# Patient Record
Sex: FEMALE | Race: WHITE | NOT HISPANIC OR LATINO | URBAN - METROPOLITAN AREA
[De-identification: names, ages, dates, MRNs, and addresses within clinical notes are randomized per-mention and may not be internally consistent; named-entity substitution may affect disease eponyms.]

---

## 2017-04-28 ENCOUNTER — OFFICE (OUTPATIENT)
Dept: URBAN - METROPOLITAN AREA CLINIC 75 | Facility: CLINIC | Age: 77
End: 2017-04-28

## 2017-04-28 VITALS — HEIGHT: 61 IN

## 2017-04-28 DIAGNOSIS — K64.0 FIRST DEGREE HEMORRHOIDS: ICD-10-CM

## 2017-04-28 PROBLEM — K64.8 HEMORRHOIDS, INTERNAL: Status: ACTIVE | Noted: 2017-04-28

## 2017-04-28 PROCEDURE — 46221 LIGATION OF HEMORRHOID(S): CPT | Performed by: INTERNAL MEDICINE

## 2017-04-28 NOTE — SERVICEHPINOTES
76-year-old white female with a history of anemia status post colonoscopy in 2015.  She is here now because she's had painless rectal bleeding with bowel movements.  She has a history of grade 1 hemorrhoids and presents for hemorrhoid banding.

## 2017-05-15 ENCOUNTER — OFFICE (OUTPATIENT)
Dept: URBAN - METROPOLITAN AREA CLINIC 75 | Facility: CLINIC | Age: 77
End: 2017-05-15

## 2017-05-15 VITALS — HEIGHT: 61 IN

## 2017-05-15 DIAGNOSIS — K64.0 FIRST DEGREE HEMORRHOIDS: ICD-10-CM

## 2017-05-15 DIAGNOSIS — K64.4 RESIDUAL HEMORRHOIDAL SKIN TAGS: ICD-10-CM

## 2017-05-15 PROBLEM — K62.5 RECTAL BLEEDING: Status: ACTIVE | Noted: 2017-04-28

## 2017-05-15 PROCEDURE — 46221 LIGATION OF HEMORRHOID(S): CPT | Performed by: INTERNAL MEDICINE

## 2017-05-15 NOTE — SERVICEHPINOTES
76-year-old white female with a history of anemia status post colonoscopy in 2015. She is here now because she's had painless rectal bleeding with bowel movements. She has a history of grade 1 hemorrhoids and presents for her 2nd hemorrhoid banding.

## 2018-04-11 ENCOUNTER — HOSPITAL ENCOUNTER (INPATIENT)
Facility: HOSPITAL | Age: 78
LOS: 12 days | Discharge: HOME HEALTH | End: 2018-04-23
Attending: PHYSICAL MEDICINE & REHABILITATION | Admitting: PHYSICAL MEDICINE & REHABILITATION

## 2018-04-11 DIAGNOSIS — I60.9 SUBARACHNOID HEMORRHAGE (HCC): ICD-10-CM

## 2018-04-11 PROCEDURE — 25010000002 CEFTRIAXONE PER 250 MG: Performed by: PHYSICAL MEDICINE & REHABILITATION

## 2018-04-11 RX ORDER — CLOPIDOGREL BISULFATE 75 MG/1
75 TABLET ORAL DAILY
Status: DISCONTINUED | OUTPATIENT
Start: 2018-04-12 | End: 2018-04-23 | Stop reason: HOSPADM

## 2018-04-11 RX ORDER — PANTOPRAZOLE SODIUM 40 MG/1
40 TABLET, DELAYED RELEASE ORAL
Status: DISCONTINUED | OUTPATIENT
Start: 2018-04-12 | End: 2018-04-23 | Stop reason: HOSPADM

## 2018-04-11 RX ORDER — ASPIRIN 325 MG
325 TABLET ORAL DAILY
Status: DISCONTINUED | OUTPATIENT
Start: 2018-04-12 | End: 2018-04-23 | Stop reason: HOSPADM

## 2018-04-11 RX ORDER — DIPHENOXYLATE HYDROCHLORIDE AND ATROPINE SULFATE 2.5; .025 MG/1; MG/1
1 TABLET ORAL DAILY
Status: DISCONTINUED | OUTPATIENT
Start: 2018-04-12 | End: 2018-04-23 | Stop reason: HOSPADM

## 2018-04-11 RX ORDER — ASCORBIC ACID 500 MG
500 TABLET ORAL DAILY
Status: DISCONTINUED | OUTPATIENT
Start: 2018-04-12 | End: 2018-04-23 | Stop reason: HOSPADM

## 2018-04-11 RX ORDER — LANOLIN ALCOHOL/MO/W.PET/CERES
50 CREAM (GRAM) TOPICAL DAILY
Status: DISCONTINUED | OUTPATIENT
Start: 2018-04-12 | End: 2018-04-23 | Stop reason: HOSPADM

## 2018-04-11 RX ORDER — DEXAMETHASONE 0.5 MG/1
1 TABLET ORAL
Status: COMPLETED | OUTPATIENT
Start: 2018-04-14 | End: 2018-04-15

## 2018-04-11 RX ORDER — FOLIC ACID 1 MG/1
1 TABLET ORAL DAILY
Status: DISCONTINUED | OUTPATIENT
Start: 2018-04-12 | End: 2018-04-23 | Stop reason: HOSPADM

## 2018-04-11 RX ORDER — TRIAMCINOLONE ACETONIDE 1 MG/G
CREAM TOPICAL EVERY 12 HOURS PRN
Status: DISCONTINUED | OUTPATIENT
Start: 2018-04-11 | End: 2018-04-23

## 2018-04-11 RX ORDER — LEVETIRACETAM 500 MG/1
1000 TABLET ORAL 2 TIMES DAILY
Status: DISCONTINUED | OUTPATIENT
Start: 2018-04-11 | End: 2018-04-23 | Stop reason: HOSPADM

## 2018-04-11 RX ORDER — TERAZOSIN 2 MG/1
2 CAPSULE ORAL NIGHTLY
Status: DISCONTINUED | OUTPATIENT
Start: 2018-04-11 | End: 2018-04-12

## 2018-04-11 RX ORDER — MELATONIN
1000 DAILY
Status: DISCONTINUED | OUTPATIENT
Start: 2018-04-12 | End: 2018-04-23 | Stop reason: HOSPADM

## 2018-04-11 RX ORDER — MONTELUKAST SODIUM 10 MG/1
10 TABLET ORAL DAILY
Status: DISCONTINUED | OUTPATIENT
Start: 2018-04-12 | End: 2018-04-23 | Stop reason: HOSPADM

## 2018-04-11 RX ORDER — METRONIDAZOLE 500 MG/1
500 TABLET ORAL EVERY 8 HOURS SCHEDULED
Status: DISCONTINUED | OUTPATIENT
Start: 2018-04-11 | End: 2018-04-23 | Stop reason: HOSPADM

## 2018-04-11 RX ORDER — EPINEPHRINE 0.3 MG/.3ML
3 INJECTION SUBCUTANEOUS AS NEEDED
Status: DISCONTINUED | OUTPATIENT
Start: 2018-04-11 | End: 2018-04-23

## 2018-04-11 RX ORDER — OXYCODONE HYDROCHLORIDE AND ACETAMINOPHEN 5; 325 MG/1; MG/1
1 TABLET ORAL EVERY 4 HOURS PRN
Status: DISCONTINUED | OUTPATIENT
Start: 2018-04-11 | End: 2018-04-23

## 2018-04-11 RX ORDER — CETIRIZINE HYDROCHLORIDE 10 MG/1
5 TABLET ORAL DAILY
Status: DISCONTINUED | OUTPATIENT
Start: 2018-04-12 | End: 2018-04-23 | Stop reason: HOSPADM

## 2018-04-11 RX ORDER — LACOSAMIDE 100 MG/1
100 TABLET ORAL EVERY 12 HOURS SCHEDULED
Status: DISCONTINUED | OUTPATIENT
Start: 2018-04-11 | End: 2018-04-23 | Stop reason: HOSPADM

## 2018-04-11 RX ORDER — HYDRALAZINE HYDROCHLORIDE 25 MG/1
25 TABLET, FILM COATED ORAL EVERY 8 HOURS SCHEDULED
Status: DISCONTINUED | OUTPATIENT
Start: 2018-04-11 | End: 2018-04-18

## 2018-04-11 RX ORDER — DEXAMETHASONE 2 MG/1
2 TABLET ORAL
Status: COMPLETED | OUTPATIENT
Start: 2018-04-12 | End: 2018-04-13

## 2018-04-11 RX ORDER — FUROSEMIDE 20 MG/1
20 TABLET ORAL DAILY
Status: DISCONTINUED | OUTPATIENT
Start: 2018-04-12 | End: 2018-04-20

## 2018-04-11 RX ORDER — OXYCODONE HYDROCHLORIDE AND ACETAMINOPHEN 5; 325 MG/1; MG/1
2 TABLET ORAL EVERY 4 HOURS PRN
Status: DISCONTINUED | OUTPATIENT
Start: 2018-04-11 | End: 2018-04-23

## 2018-04-11 RX ORDER — ALBUTEROL SULFATE 2.5 MG/3ML
2.5 SOLUTION RESPIRATORY (INHALATION) EVERY 6 HOURS PRN
Status: DISCONTINUED | OUTPATIENT
Start: 2018-04-11 | End: 2018-04-23

## 2018-04-11 RX ORDER — METOPROLOL SUCCINATE 100 MG/1
100 TABLET, EXTENDED RELEASE ORAL
Status: DISCONTINUED | OUTPATIENT
Start: 2018-04-12 | End: 2018-04-12

## 2018-04-11 RX ORDER — ESCITALOPRAM OXALATE 20 MG/1
20 TABLET ORAL DAILY
Status: DISCONTINUED | OUTPATIENT
Start: 2018-04-12 | End: 2018-04-23 | Stop reason: HOSPADM

## 2018-04-11 RX ADMIN — TERAZOSIN HYDROCHLORIDE ANHYDROUS 2 MG: 2 CAPSULE ORAL at 23:26

## 2018-04-11 RX ADMIN — METRONIDAZOLE 500 MG: 500 TABLET, FILM COATED ORAL at 23:27

## 2018-04-11 RX ADMIN — LEVETIRACETAM 1000 MG: 500 TABLET, FILM COATED ORAL at 23:27

## 2018-04-11 RX ADMIN — LACOSAMIDE 100 MG: 100 TABLET, FILM COATED ORAL at 23:26

## 2018-04-11 RX ADMIN — SODIUM CHLORIDE 2 G: 900 INJECTION INTRAVENOUS at 23:48

## 2018-04-11 RX ADMIN — HYDRALAZINE HYDROCHLORIDE 25 MG: 25 TABLET ORAL at 23:27

## 2018-04-12 PROBLEM — I67.848 CEREBRAL VASOSPASM: Status: ACTIVE | Noted: 2018-03-24

## 2018-04-12 PROBLEM — E78.5 HLD (HYPERLIPIDEMIA): Status: ACTIVE | Noted: 2018-04-12

## 2018-04-12 PROBLEM — K21.9 ACID REFLUX: Status: ACTIVE | Noted: 2018-04-12

## 2018-04-12 PROBLEM — S30.1XXA GROIN HEMATOMA: Status: ACTIVE | Noted: 2018-03-27

## 2018-04-12 PROBLEM — I67.1 BALLOON LIKE SWELLING OF AN ARTERY OF THE BRAIN: Status: ACTIVE | Noted: 2018-04-12

## 2018-04-12 PROBLEM — I60.9 SUBARACHNOID HEMORRHAGE (HCC): Status: ACTIVE | Noted: 2018-04-12

## 2018-04-12 PROBLEM — Z22.322 MRSA (METHICILLIN RESISTANT STAPH AUREUS) CULTURE POSITIVE: Status: ACTIVE | Noted: 2018-04-12

## 2018-04-12 PROBLEM — I77.6: Status: ACTIVE | Noted: 2018-03-22

## 2018-04-12 PROBLEM — I60.9: Status: ACTIVE | Noted: 2018-03-22

## 2018-04-12 PROBLEM — G93.41 ACUTE METABOLIC ENCEPHALOPATHY: Status: ACTIVE | Noted: 2018-03-24

## 2018-04-12 LAB
ALBUMIN SERPL-MCNC: 2.9 G/DL (ref 3.5–5.2)
ALBUMIN/GLOB SERPL: 1.2 G/DL
ALP SERPL-CCNC: 60 U/L (ref 39–117)
ALT SERPL W P-5'-P-CCNC: 29 U/L (ref 1–33)
ANION GAP SERPL CALCULATED.3IONS-SCNC: 14.5 MMOL/L
AST SERPL-CCNC: 20 U/L (ref 1–32)
BASOPHILS # BLD AUTO: 0.04 10*3/MM3 (ref 0–0.2)
BASOPHILS NFR BLD AUTO: 0.8 % (ref 0–1.5)
BILIRUB SERPL-MCNC: 0.8 MG/DL (ref 0.1–1.2)
BUN BLD-MCNC: 20 MG/DL (ref 8–23)
BUN/CREAT SERPL: 19.2 (ref 7–25)
CALCIUM SPEC-SCNC: 8.4 MG/DL (ref 8.6–10.5)
CHLORIDE SERPL-SCNC: 100 MMOL/L (ref 98–107)
CO2 SERPL-SCNC: 20.5 MMOL/L (ref 22–29)
CREAT BLD-MCNC: 1.04 MG/DL (ref 0.57–1)
DEPRECATED RDW RBC AUTO: 52.4 FL (ref 37–54)
EOSINOPHIL # BLD AUTO: 0.11 10*3/MM3 (ref 0–0.7)
EOSINOPHIL NFR BLD AUTO: 2.1 % (ref 0.3–6.2)
ERYTHROCYTE [DISTWIDTH] IN BLOOD BY AUTOMATED COUNT: 16.2 % (ref 11.7–13)
GFR SERPL CREATININE-BSD FRML MDRD: 52 ML/MIN/1.73
GLOBULIN UR ELPH-MCNC: 2.5 GM/DL
GLUCOSE BLD-MCNC: 104 MG/DL (ref 65–99)
GLUCOSE BLDC GLUCOMTR-MCNC: 101 MG/DL (ref 70–130)
GLUCOSE BLDC GLUCOMTR-MCNC: 120 MG/DL (ref 70–130)
GLUCOSE BLDC GLUCOMTR-MCNC: 124 MG/DL (ref 70–130)
GLUCOSE BLDC GLUCOMTR-MCNC: 150 MG/DL (ref 70–130)
HCT VFR BLD AUTO: 28.3 % (ref 35.6–45.5)
HGB BLD-MCNC: 9.3 G/DL (ref 11.9–15.5)
IMM GRANULOCYTES # BLD: 0.1 10*3/MM3 (ref 0–0.03)
IMM GRANULOCYTES NFR BLD: 1.9 % (ref 0–0.5)
LYMPHOCYTES # BLD AUTO: 1.1 10*3/MM3 (ref 0.9–4.8)
LYMPHOCYTES NFR BLD AUTO: 21 % (ref 19.6–45.3)
MAGNESIUM SERPL-MCNC: 1.7 MG/DL (ref 1.6–2.4)
MCH RBC QN AUTO: 29.4 PG (ref 26.9–32)
MCHC RBC AUTO-ENTMCNC: 32.9 G/DL (ref 32.4–36.3)
MCV RBC AUTO: 89.6 FL (ref 80.5–98.2)
MONOCYTES # BLD AUTO: 0.26 10*3/MM3 (ref 0.2–1.2)
MONOCYTES NFR BLD AUTO: 5 % (ref 5–12)
NEUTROPHILS # BLD AUTO: 3.62 10*3/MM3 (ref 1.9–8.1)
NEUTROPHILS NFR BLD AUTO: 69.2 % (ref 42.7–76)
PHOSPHATE SERPL-MCNC: 3.8 MG/DL (ref 2.5–4.5)
PLATELET # BLD AUTO: 181 10*3/MM3 (ref 140–500)
PMV BLD AUTO: 8.8 FL (ref 6–12)
POTASSIUM BLD-SCNC: 3.6 MMOL/L (ref 3.5–5.2)
PREALB SERPL-MCNC: 24.4 MG/DL (ref 20–40)
PROT SERPL-MCNC: 5.4 G/DL (ref 6–8.5)
RBC # BLD AUTO: 3.16 10*6/MM3 (ref 3.9–5.2)
SODIUM BLD-SCNC: 135 MMOL/L (ref 136–145)
WBC NRBC COR # BLD: 5.23 10*3/MM3 (ref 4.5–10.7)

## 2018-04-12 PROCEDURE — 97535 SELF CARE MNGMENT TRAINING: CPT | Performed by: OCCUPATIONAL THERAPIST

## 2018-04-12 PROCEDURE — 97110 THERAPEUTIC EXERCISES: CPT

## 2018-04-12 PROCEDURE — 97166 OT EVAL MOD COMPLEX 45 MIN: CPT | Performed by: OCCUPATIONAL THERAPIST

## 2018-04-12 PROCEDURE — 84134 ASSAY OF PREALBUMIN: CPT | Performed by: STUDENT IN AN ORGANIZED HEALTH CARE EDUCATION/TRAINING PROGRAM

## 2018-04-12 PROCEDURE — 25010000002 CEFTRIAXONE: Performed by: PHYSICAL MEDICINE & REHABILITATION

## 2018-04-12 PROCEDURE — 97162 PT EVAL MOD COMPLEX 30 MIN: CPT

## 2018-04-12 PROCEDURE — 80053 COMPREHEN METABOLIC PANEL: CPT | Performed by: STUDENT IN AN ORGANIZED HEALTH CARE EDUCATION/TRAINING PROGRAM

## 2018-04-12 PROCEDURE — 96125 COGNITIVE TEST BY HC PRO: CPT

## 2018-04-12 PROCEDURE — 84100 ASSAY OF PHOSPHORUS: CPT | Performed by: STUDENT IN AN ORGANIZED HEALTH CARE EDUCATION/TRAINING PROGRAM

## 2018-04-12 PROCEDURE — 83735 ASSAY OF MAGNESIUM: CPT | Performed by: STUDENT IN AN ORGANIZED HEALTH CARE EDUCATION/TRAINING PROGRAM

## 2018-04-12 PROCEDURE — 85025 COMPLETE CBC W/AUTO DIFF WBC: CPT | Performed by: STUDENT IN AN ORGANIZED HEALTH CARE EDUCATION/TRAINING PROGRAM

## 2018-04-12 PROCEDURE — 82962 GLUCOSE BLOOD TEST: CPT

## 2018-04-12 PROCEDURE — 97110 THERAPEUTIC EXERCISES: CPT | Performed by: OCCUPATIONAL THERAPIST

## 2018-04-12 PROCEDURE — 97112 NEUROMUSCULAR REEDUCATION: CPT | Performed by: OCCUPATIONAL THERAPIST

## 2018-04-12 RX ORDER — BENZONATATE 100 MG/1
100 CAPSULE ORAL 2 TIMES DAILY
COMMUNITY
End: 2018-04-23 | Stop reason: HOSPADM

## 2018-04-12 RX ORDER — FOLIC ACID 1 MG/1
1 TABLET ORAL DAILY
COMMUNITY

## 2018-04-12 RX ORDER — HYDRALAZINE HYDROCHLORIDE 10 MG/1
10 TABLET, FILM COATED ORAL EVERY 6 HOURS PRN
Status: DISCONTINUED | OUTPATIENT
Start: 2018-04-12 | End: 2018-04-23 | Stop reason: HOSPADM

## 2018-04-12 RX ORDER — MELATONIN
1000 DAILY
COMMUNITY

## 2018-04-12 RX ORDER — ESCITALOPRAM OXALATE 20 MG/1
20 TABLET ORAL DAILY
COMMUNITY

## 2018-04-12 RX ORDER — DOXAZOSIN 2 MG/1
2 TABLET ORAL NIGHTLY
COMMUNITY
End: 2018-04-23 | Stop reason: HOSPADM

## 2018-04-12 RX ORDER — MONTELUKAST SODIUM 10 MG/1
10 TABLET ORAL NIGHTLY
COMMUNITY

## 2018-04-12 RX ORDER — CETIRIZINE HYDROCHLORIDE 10 MG/1
10 TABLET ORAL DAILY
Status: ON HOLD | COMMUNITY
End: 2018-04-23

## 2018-04-12 RX ORDER — LANOLIN ALCOHOL/MO/W.PET/CERES
100 CREAM (GRAM) TOPICAL DAILY
COMMUNITY

## 2018-04-12 RX ORDER — ASCORBIC ACID 500 MG
500 TABLET ORAL DAILY
COMMUNITY

## 2018-04-12 RX ADMIN — PANTOPRAZOLE SODIUM 40 MG: 40 TABLET, DELAYED RELEASE ORAL at 06:29

## 2018-04-12 RX ADMIN — DEXAMETHASONE 2 MG: 2 TABLET ORAL at 07:50

## 2018-04-12 RX ADMIN — MONTELUKAST 10 MG: 10 TABLET, FILM COATED ORAL at 07:50

## 2018-04-12 RX ADMIN — LEVETIRACETAM 1000 MG: 500 TABLET, FILM COATED ORAL at 21:10

## 2018-04-12 RX ADMIN — CETIRIZINE HYDROCHLORIDE 5 MG: 10 TABLET, FILM COATED ORAL at 07:51

## 2018-04-12 RX ADMIN — OXYCODONE HYDROCHLORIDE AND ACETAMINOPHEN 500 MG: 500 TABLET ORAL at 07:51

## 2018-04-12 RX ADMIN — LACOSAMIDE 100 MG: 100 TABLET, FILM COATED ORAL at 07:49

## 2018-04-12 RX ADMIN — ESCITALOPRAM 20 MG: 20 TABLET, FILM COATED ORAL at 07:50

## 2018-04-12 RX ADMIN — PYRIDOXINE HCL TAB 50 MG 50 MG: 50 TAB at 07:51

## 2018-04-12 RX ADMIN — Medication 1 TABLET: at 07:51

## 2018-04-12 RX ADMIN — LEVETIRACETAM 1000 MG: 500 TABLET, FILM COATED ORAL at 07:50

## 2018-04-12 RX ADMIN — METRONIDAZOLE 500 MG: 500 TABLET, FILM COATED ORAL at 21:10

## 2018-04-12 RX ADMIN — LACOSAMIDE 100 MG: 100 TABLET, FILM COATED ORAL at 21:10

## 2018-04-12 RX ADMIN — FOLIC ACID 1 MG: 1 TABLET ORAL at 07:52

## 2018-04-12 RX ADMIN — FUROSEMIDE 20 MG: 20 TABLET ORAL at 07:49

## 2018-04-12 RX ADMIN — HYDRALAZINE HYDROCHLORIDE 25 MG: 25 TABLET ORAL at 14:36

## 2018-04-12 RX ADMIN — CLOPIDOGREL 75 MG: 75 TABLET, FILM COATED ORAL at 07:51

## 2018-04-12 RX ADMIN — VITAMIN D, TAB 1000IU (100/BT) 1000 UNITS: 25 TAB at 07:50

## 2018-04-12 RX ADMIN — METRONIDAZOLE 500 MG: 500 TABLET, FILM COATED ORAL at 06:29

## 2018-04-12 RX ADMIN — SODIUM CHLORIDE 2 G: 900 INJECTION INTRAVENOUS at 23:18

## 2018-04-12 RX ADMIN — METRONIDAZOLE 500 MG: 500 TABLET, FILM COATED ORAL at 14:37

## 2018-04-12 RX ADMIN — METOPROLOL SUCCINATE 100 MG: 100 TABLET, FILM COATED, EXTENDED RELEASE ORAL at 07:50

## 2018-04-12 RX ADMIN — ASPIRIN 325 MG: 325 TABLET ORAL at 07:50

## 2018-04-12 NOTE — CONSULTS
"Adult Nutrition  Assessment/PES    Patient Name:  Herlinda Ta  YOB: 1940  MRN: [id number]  Admit Date:  4/11/2018    Assessment Date:  4/12/2018    Comments:  Rehab assessment complete. Will monitor nutritional intake during stay. On appropriate vit/min supplement for wound healing support. PO 75% x 1 meal.           Adult Nutrition Assessment     Row Name 04/12/18 1300       Reason for Assessment    Reason For Assessment nurse/nurse practitioner consult;per organizational policy    Diagnosis neurologic conditions;surgery/postoperative complications   SAH, opthalmic artery aneurysm s/p endovascular embolization    Identified At Risk by Screening Criteria no indicators present       Nutrition/Diet History    Typical Food/Fluid Intake Good; no swallow impairment       Anthropometrics    Height 157.5 cm (62\")   per [place]'s records       Admit Weight    Admit Weight --   147# 4/5/18 at [facility]; no rehab adm wt on file       Ideal Body Weight (IBW)    Ideal Body Weight (IBW) (kg) 50.43       Body Mass Index (BMI)    BMI Assessment BMI 25-29.9: overweight                                  Labs/Procedures/Meds    Lab Results Reviewed reviewed    Lab Results Comments Glu 101-150       Diagnostic Tests/Procedures    Diagnostic Test/Procedure Reviewed reviewed       Medications    Pertinent Medications Reviewed reviewed    Pertinent Medications Comments steriods, diuretic, Vit D, abx, folic acid, mvi       Physical Findings    Skin non-healing wound(s)   wound debridement with VAC placement @ [facility]       Calculation Measurements    Weight Used For Calculations 66.7 kg (147 lb)       KCAL/KG    14 Kcal/Kg (kcal) 933.51    15 Kcal/Kg (kcal) 1000.19    18 Kcal/Kg (kcal) 1200.22    20 Kcal/Kg (kcal) 1333.58    25 Kcal/Kg (kcal) 1666.98    30 Kcal/Kg (kcal) 2000.37    35 Kcal/Kg (kcal) 2333.77    40 Kcal/Kg (kcal) 2667.16    45 Kcal/Kg (kcal) 3000.56    50 Kcal/Kg (kcal) 3333.95       [facility] " Equation    RMR ([facility]) 1110.04       Protein Requirements    Est Protein Requirement Amount (gms/kg) 1.2 gm protein (up to 1.5 g/kg)    Estimated Protein Requirements (gms/day) 80.01       Fluid Requirements    Estimated Fluid Requirements (mL/day) --   2000            Nutrition Prescription PO    Current PO Diet Regular    Fluid Consistency Thin    Common Modifiers Consistent Carbohydrate       PO Evaluation    Number of Days PO Intake Evaluated 1 day    % PO Intake 75%          Problem/Interventions:        Problem 1     Row Name 04/12/18 1342       Nutrition Diagnoses Problem 1    Problem 1 Nutrition Appropriate for Condition at this Time    Etiology (related to) Factors Affecting Nutrition    Other Carb controlled diet d/t steriods; on MVI/min supple + Vit C for wound healing                    Intervention Goal     Row Name 04/12/18 5398       Intervention Goal    General Maintain nutrition;Disease management/therapy;Meet nutritional needs for age/condition    PO Maintain intake;PO intake (%)    PO Intake % 75 %    Weight No significant weight loss            Nutrition Intervention     Row Name 04/12/18 134       Nutrition Intervention    RD/Tech Action Advise alternate selection;Menu provided;Encourage intake;Care plan reviewd;Follow Tx progress              Education/Evaluation     Row Name 04/12/18 0254       Education    Education Will Instruct as appropriate       Monitor/Evaluation    Monitor Per protocol        Electronically signed by:  Shira Cruz RD  04/12/18 1:44 PM

## 2018-04-12 NOTE — PROGRESS NOTES
Occupational Therapy: Individual: 90 minutes.    Physical Therapy: Branch    Speech Language Pathology:  Branch    Signed by: ANNIE Silva/YUSEF

## 2018-04-12 NOTE — PROGRESS NOTES
Inpatient Rehabilitation Functional Measures Assessment and Plan of Care    Plan of Care  Updated Problems/Interventions  Mobility    [OT] Toilet Transfers(Active)  Current Status(04/12/2018): CGA/MIN  Weekly Goal(04/20/2018): CGA  Discharge Goal: SBA    [OT] Tub/Shower Transfers(Active)  Current Status(04/12/2018): TBD  Weekly Goal(04/20/2018): TBD  Discharge Goal: TBD        Self Care    [OT] Bathing(Active)  Current Status(04/12/2018): MIN  Weekly Goal(04/20/2018): CGA  Discharge Goal: SBA    [OT] Dressing (Lower)(Active)  Current Status(04/12/2018): MOD  Weekly Goal(04/20/2018): MIN  Discharge Goal: CGA/SBA    [OT] Dressing (Upper)(Active)  Current Status(04/12/2018): MIN  Weekly Goal(04/20/2018): SETUP  Discharge Goal: SBA    [OT] Grooming(Active)  Current Status(04/12/2018): SETUP  Weekly Goal(04/20/2018): SBA  Discharge Goal: SUP    [OT] Toileting(Active)  Current Status(04/12/2018): MOD  Weekly Goal(04/20/2018): CGA  Discharge Goal: SBA    Functional Measures  BERONICA Eating:  Branch  BERONICA Grooming: Grooming Score = 4.  Patient requires minimal assistance  for  grooming, requiring  incidental help only. No assistive devices were required.  BERONICA Bathing:  Patient took sponge bath. Patient requires no physical assistance  for washing, rinsing, and drying the right arm. Patient requires no physical  assistance for washing, rinsing, and drying the left arm. Patient requires no  physical assistance for washing, rinsing, and drying the chest. Patient requires  no physical assistance for washing, rinsing, and drying the abdomen. Patient  requires no physical assistance for washing, rinsing, and drying the perineal  area. Patient requires moderate assistance for washing, rinsing, or drying the  buttocks. Patient requires no physical assistance for washing, rinsing, and  drying the right upper leg. Patient requires no physical assistance for washing,  rinsing, and drying the left upper leg. Patient requires no physical  assistance  for washing, rinsing, and drying the right lower leg, including the foot.  Patient requires no physical assistance for washing, rinsing, and drying the  left lower leg, including the foot. Patient performs 90 % of bathing tasks.  Bathing Score = 4, Minimal Assistance. No assistive devices were required.  BERONICA Upper Body Dressing:  Patient requires no physical assistance for gathering  clothes. Wearing a bra or undershirt was not applicable for this patient.  Patient requires no physical assistance for threading the right arm through the  garment (shirt/sweater). Patient requires no physical assistance for threading  the left arm through the garment (shirt/sweater). Patient requires no physical  assistance for pulling an over-head-garment over head or pulling  front-fastening-garment around back. Patient requires minimal/incidental  physical assistance for pulling an over-head-garment down the trunk or  adjusting/fastening together a front-fastening-garment. Patient performs 95 % of  upper body dressing tasks. Upper Body Dressing Score = 4, Minimal Assistance. No  assistive devices were required.  BERONICA Lower Body Dressing:  Patient requires no physical assistance for gathering  clothes. Wearing underwear or an undergarment is not applicable for this  patient. Patient requires moderate/considerable physical assistance for  threading the right leg through the pants/skirt. Patient requires  minimal/incidental assistance for threading the left leg through the  pants/skirt. Patient requires minimal/incidental physical assistance for pulling  pants/skirt over hips and adjusting fasteners. Patient requires  maximal/significant physical assistance for holding clothing and/or donning  and/or doffing right sock. Patient requires minimal/incidental physical  assistance for donning and/or doffing left sock. Patient requires  maximal/significant physical assistance for holding clothing and/or donning  and/or doffing  right shoe. Patient requires moderate/considerable physical  assistance for donning and/or doffing left shoe. Patient performs 59.38 % of  lower body dressing tasks. Lower Body Dressing Score = 3, Moderate Assistance.  No assistive devices were required.  [facility] Toileting:  Patient requires minimal assistance for adjusting clothing  before using a toilet, commode, bedpan, or urinal. Patient requires moderate  assistance for hygiene. Patient requires minimal assistance for adjusting  clothing after using a toilet, commode, bedpan, or urinal. Patient performs 0 -  24% of toileting tasks.  Toileting Score = 1, Total Assistance. No assistive  devices were required.    [facility] Bladder Management  Level of Assistance:  [place]  Frequency/Number of Accidents this Shift:  [facility] Bowel Management  Level of Assistance: [place]  Frequency/Number of Accidents this Shift: [facility] Bed/Chair/Wheelchair Transfer:  [facility] Toilet Transfer:  Toilet Transfer Score = 4.  Patient performs 75% or more  of effort and minimal assistance (little/incidental help/steadying) for  transferring to and from the toilet/commode, requiring: Contact guard. Patient  requires the following assistive device(s): Grab bars.  BERONICA Tub/Shower Transfer:  Activity was not observed.    Previously Documented Mode of Locomotion at Discharge: Field  BERONICA Expected Mode of Locomotion at Discharge: [facility] Walk/Wheelchair:  [facility] Stairs:  [facility] Comprehension:  [facility] Expression:  [facility] Social Interaction:  [facility] Problem Solving:  [facility] Memory:  [place]    Therapy Mode Minutes  Occupational Therapy: Branch  Physical Therapy: Branch  Speech Language Pathology:  Branch    Signed by: ANNIE Silva/YUSEF

## 2018-04-12 NOTE — PROGRESS NOTES
Inpatient Rehabilitation Plan of Care Note    Plan of Care  Care Plan Reviewed - No updates at this time.    Safety    Performed Intervention(s)  Safety rounds; call light/items within easy reach  Bed Alarm      Body Function Structure    Performed Intervention(s)  Skin assessment every shift  Dressing changes/Wound Care as order  Turning      Sphincter Control    Performed Intervention(s)  Monitor I&O  Timed voids  Use incontinent products      Psychosocial    Performed Intervention(s)  Encourage to verbalize concerns  Offer diversional activities    Signed by: Blaire Rapp RN

## 2018-04-12 NOTE — PROGRESS NOTES
Inpatient Rehabilitation Functional Measures Assessment    Functional Measures  BERONICA Eating:  [facility] Grooming: [facility] Bathing:  [facility] Upper Body Dressing:  [facility] Lower Body Dressing:  [facility] Toileting:  [facility] Bladder Management  Level of Assistance:  [place]  Frequency/Number of Accidents this Shift:  [facility] Bowel Management  Level of Assistance: [place]  Frequency/Number of Accidents this Shift: [facility] Bed/Chair/Wheelchair Transfer:  [facility] Toilet Transfer:  [facility] Tub/Shower Transfer:  [place]    Previously Documented Mode of Locomotion at Discharge: Field  BERONICA Expected Mode of Locomotion at Discharge: [facility] Walk/Wheelchair:  [facility] Stairs:  [facility] Comprehension:  Auditory comprehension is the usual mode. Comprehension  Score = 6, Modified [place].  Patient comprehends complex/abstract  information in their primary language, requiring: Additional time.  BERONICA Expression:  Vocal expression is the usual mode. Expression Score = 6,  Modified Independent.  Patient expresses complex/abstract information in their  primary language, requiring: Additional time.  BERONICA Social Interaction:  Social Interaction Score = 6, Modified Independent.  Patient is modified independent for social interaction, requiring: Requires  additional time.  [facility] Problem Solving:  Activity was not observed.  BERONICA Memory:  Memory Score = 6, Modified [place].  Patient is modified  independent for memory, requiring: Requires additional time.    Therapy Mode Minutes  Occupational Therapy: Branch  Physical Therapy: Branch  Speech Language Pathology:  Branch    Signed by: Miya Newman RN

## 2018-04-12 NOTE — PROGRESS NOTES
Occupational Therapy: Branch    Physical Therapy: Branch    Speech Language Pathology:  Individual: 60 minutes.    Signed by: Keren Mckinley, SLP

## 2018-04-12 NOTE — NURSING NOTE
VAC drsg intact to right groin wound; drsg chg due tomorrow.  Changed vac setting to 75 mm hg, continuous suction per MD recommendations from [facility] surgeon

## 2018-04-12 NOTE — THERAPY EVALUATION
Inpatient Rehabilitation - Physical Therapy Initial Evaluation       [facility]     Patient Name: Herlinda Ta  : 1940  MRN: [id number]    Today's Date: 2018                    Admit Date: 2018      Visit Dx: No diagnosis found.    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   • Cerebral vasospasm   • Cervical pain (neck)   • Depressed   • Depression   • Dyspepsia   • Acid reflux   • Groin hematoma   • HTN, goal below 140/90   • HLD (hyperlipidemia)   • Infection of artery   • Iron deficiency anemia   • MRSA (methicillin resistant staph aureus) culture positive   • Rheumatoid arthritis   • Urinary incontinence, mixed   • Subarachnoid hemorrhage       Past Medical History:   Diagnosis Date   • Depression    • GERD (gastroesophageal reflux disease)    • HLD (hyperlipidemia) 2018   • Hypertension        History reviewed. No pertinent surgical history.       PT ASSESSMENT (last 72 hours)      IRF Physical Therapy Evaluation     Row Name 18          Evaluation/Treatment Time and Intent    Document Type evaluation  -EE     Mode of Treatment physical therapy  -EE     Patient/Family Observations Pt sitting up in WC in am; in bed in pm.  -EE     Row Name 18          Cognition/Psychosocial- PT/OT    Orientation Status (Cognition) oriented x 4  -EE     Follows Commands (Cognition) follows multi-step commands;over 90% accuracy;delayed response/completion;repetition of directions required;verbal cues/prompting required  -EE     Cognitive Function (Cognitive) attention deficit;safety deficit  -EE     Safety Deficit (Cognitive) mild deficit;insight into deficits/self awareness  -EE     Row Name 18          Bed Mobility Assessment/Treatment    Bed Mobility  Assessment/Treatment rolling left;rolling right;supine-sit;sit-supine  -EE     Rolling Left [place] (Bed Mobility) contact guard  -EE     Rolling Right [place] (Bed Mobility) contact guard  -EE     Supine-Sit [place] (Bed Mobility) minimum assist (75% patient effort)  -EE     Sit-Supine [place] (Bed Mobility) contact guard  -EE     Row Name 04/12/18 0900          Transfer Assessment/Treatment    Transfer Assessment/Treatment sit-stand transfer;stand-sit transfer;bed-chair transfer;chair-bed transfer;car transfer  -EE     Bed-Chair [place] (Transfers) contact guard;verbal cues  -EE     Chair-Bed [place] (Transfers) contact guard;verbal cues  -EE     Assistive Device (Bed-Chair Transfers) walker, front-wheeled  -EE     Sit-Stand [place] (Transfers) contact guard;verbal cues  -EE     Stand-Sit [place] (Transfers) contact guard;verbal cues  -EE     [place] Level (Toilet Transfer) minimum assist (75% patient effort);verbal cues  -EE     Assistive Device (Toilet Transfer) grab bars/safety frame;walker, front-wheeled  -EE     Row Name 04/12/18 0900          Chair-Bed Transfer    Assistive Device (Chair-Bed Transfers) walker, front-wheeled  -EE     Row Name 04/12/18 0900          Sit-Stand Transfer    Assistive Device (Sit-Stand Transfers) walker, front-wheeled  -EE     Row Name 04/12/18 0900          Stand-Sit Transfer    Assistive Device (Stand-Sit Transfers) walker, front-wheeled  -EE     Row Name 04/12/18 0900          Toilet Transfer    Type (Toilet Transfer) sit-stand;stand-sit  -EE     Row Name 04/12/18 0900          Car Transfer    Type (Car Transfer) stand pivot/stand step  -EE     [place] Level (Car Transfer) minimum assist (75% patient effort);verbal cues  -EE     Assistive Device (Car Transfer) walker, front-wheeled  -EE     Row Name 04/12/18 0900          Gait/Stairs Assessment/Training    [place] Level (Gait) contact guard;minimum assist (75% patient  effort);verbal cues   CGA during pm session  -EE     Assistive Device (Gait) walker, front-wheeled  -EE     Distance in Feet (Gait) 160 x 2 , 80 x 2  -EE     Pattern (Gait) step-through  -EE     Deviations/Abnormal Patterns (Gait) genesis decreased  -EE     Right Sided Gait Deviations heel strike decreased   multiple instances of R foot kicking walker   -EE     [place] Level (Stairs) minimum assist (75% patient effort);verbal cues  -EE     Handrail Location (Stairs) both sides  -EE     Number of Steps (Stairs) 4  -EE     Ascending Technique (Stairs) step-to-step  -EE     Descending Technique (Stairs) step-to-step  -EE     Stairs, Safety Issues weight-shifting ability decreased;balance decreased during turns  -EE     Stairs, Impairments strength decreased;impaired balance  -EE     Comment (Gait/Stairs) verbal cues for upright posture and to navigate around obstacles on R side. Cues for R foot placement when climbing stairs.  -EE     Row Name 04/12/18 0900          Safety Issues, Functional Mobility    Safety Issues Affecting Function (Mobility) insight into deficits/self awareness;safety precaution awareness  -EE     Impairments Affecting Function (Mobility) balance;endurance/activity tolerance;strength  -EE     Row Name 04/12/18 0900          General ROM    GENERAL ROM COMMENTS B LEs WFL  -EE     Row Name 04/12/18 0900          MMT (Manual Muscle Testing)    General Manual Muscle Testing (MMT) Assessment lower extremity strength deficits identified  -EE     Comment, General Manual Muscle Testing (MMT) Assessment B LEs generalized weakness; grossly 3+/5  -EE     Row Name 04/12/18 0900          Vision Assessment/Intervention    Visual Impairment/Limitations other (see comments)  -EE     Vision Assessment Comment pt appears to have impaired R peripheral vision as compared to L side; multiple instances of bumping obstacles on R side during ambulation throughout gym  -EE     Row Name 04/12/18 0900          Pain  Assessment    Additional Documentation Pain Scale: Numbers Pre/Post-Treatment (Group)  -EE     Row Name 04/12/18 0900          Pain Scale: Numbers Pre/Post-Treatment    Pain Scale: Numbers, Pretreatment 0/10 - no pain  -EE     Row Name 04/12/18 0900          Balance    Balance static sitting balance;static standing balance  -EE     Additional Documentation Balance (Row)  -EE     Row Name 04/12/18 0900          Static Sitting Balance    Level of [place] (Unsupported Sitting, Static Balance) supervision  -EE     Sitting Position (Unsupported Sitting, Static Balance) sitting edge of mat  -EE     Time Able to Maintain Position (Unsupported Sitting, Static Balance) more than 5 minutes  -EE     Row Name 04/12/18 0900          Static Standing Balance    Level of [place] (Supported Standing, Static Balance) contact guard assist  -EE     Time Able to Maintain Position (Supported Standing, Static Balance) 3 to 4 minutes  -EE     Assistive Device Utilized (Supported Standing, Static Balance) rolling walker  -EE     Row Name 04/12/18 0900          PT Clinical Impression    General Observations of Patient Pt presents after SAH, opthalmic artery aneurysm s/p endovascular embolization. Right Groin hematoma, right groin wound infection s/p wound vac placement. Upon exam, pt demonstrates generalized weakness, impaired balance, decreased endurance, and decreased safety awareness limiting mobility. Pt was independent and active prior to admission, currently requiring CGA to min A and use of walker for safety. Pt would benefit from continued PT to address impairments and assist w/return to PLOF.   -EE     Patient's Goals For Discharge return home;return to all previous roles/activities  -EE     Plan For Care Reviewed: Physical Therapy PT plan for care discussed with patient;PT plan for care discussed with family  -EE     Rehab Potential/Prognosis (PT Eval) good, to achieve stated therapy goals  -EE     Frequency of Treatment  (PT Eval) 5 times per week  -EE     Estimated Length of Stay, Weeks (PT Eval) 2 weeks  -EE     Existing Precautions/Restrictions fall;other (see comments)   wound vac R groin  -EE     Limitations/Impairments safety/cognitive  -EE     Expected Discharge Disposition (PT Eval) home or self care   may benefit from OP PT  -EE     Row Name 04/12/18 0900          IRF PT Goals    Bed Mobility Goal Selection (PT-IRF) bed mobility, PT goal 1  -EE     Transfer Goal Selection (PT-IRF) transfers, PT goal 1;transfers, PT goal 2  -EE     Gait (Walking Locomotion) Goal Selection (PT-IRF) gait, PT goal 1  -EE     Stairs Goal Selection (PT-IRF) stairs, PT goal 1  -EE     Row Name 04/12/18 0900          Bed Mobility Goal 1 (PT-IRF)    Activity/Assistive Device (Bed Mobility Goal 1, PT-IRF) bed mobility activities, all  -EE     [place] Level (Bed Mobility Goal 1, PT-IRF) independent  -EE     Time Frame (Bed Mobility Goal 1, PT-IRF) long term goal (LTG);2 weeks  -EE     Progress/Outcomes (Bed Mobility Goal 1, PT-IRF) goal ongoing  -EE     Row Name 04/12/18 0900          Transfer Goal 1 (PT-IRF)    Activity/Assistive Device (Transfer Goal 1, PT-IRF) sit-to-stand/stand-to-sit;bed-to-chair/chair-to-bed   with A.A.D.  -EE     [place] Level (Transfer Goal 1, PT-IRF) conditional independence  -EE     Time Frame (Transfer Goal 1, PT-IRF) long term goal (LTG);2 weeks  -EE     Progress/Outcomes (Transfer Goal 1, PT-IRF) goal ongoing  -EE     Row Name 04/12/18 0900          Transfer Goal 2 (PT-IRF)    Activity/Assistive Device (Transfer Goal 2, PT-IRF) car transfer  -EE     [place] Level (Transfer Goal 2, PT-IRF) supervision required  -EE     Time Frame (Transfer Goal 2, PT-IRF) long term goal (LTG);2 weeks  -EE     Progress/Outcomes (Transfer Goal 2, PT-IRF) goal ongoing  -EE     Row Name 04/12/18 0900          Gait/Walking Locomotion Goal 1 (PT-IRF)    Activity/Assistive Device (Gait/Walking Locomotion Goal 1, PT-IRF) gait  (walking locomotion)   with A.A.D.  -EE     Gait/Walking Locomotion Distance Goal 1 (PT-IRF) 160  -EE     [place] Level (Gait/Walking Locomotion Goal 1, PT-IRF) supervision required  -EE     Time Frame (Gait/Walking Locomotion Goal 1, PT-IRF) long term goal (LTG);2 weeks  -EE     Progress/Outcomes (Gait/Walking Locomotion Goal 1, PT-IRF) goal ongoing  -EE     Row Name 04/12/18 0900          Stairs Goal 1 (PT-IRF)    Activity/Assistive Device (Stairs Goal 1, PT-IRF) ascending stairs;descending stairs;using handrail, left;using handrail, right  -EE     Number of Stairs (Stairs Goal 1, PT-IRF) 12  -EE     [place] Level (Stairs Goal 1, PT-IRF) supervision required  -EE     Time Frame (Stairs Goal 1, PT-IRF) long term goal (LTG);2 weeks  -EE     Progress/Outcomes (Stairs Goal 1, PT-IRF) goal ongoing  -EE     Row Name 04/12/18 0900          Positioning and Restraints    Pre-Treatment Position sitting in chair/recliner  -EE     Post Treatment Position wheelchair  -EE     In Wheelchair sitting;call light within reach;encouraged to call for assist;with family/caregiver;exit alarm on  -EE       User Key  (r) = Recorded By, (t) = Taken By, (c) = Cosigned By    Initials Name Provider Type    EE Enma Bernstein, PT Physical Therapist                  PT Recommendation and Plan    Planned Therapy Interventions (PT Eval): balance training, bed mobility training, gait training, home exercise program, neuromuscular re-education, patient/family education, stair training, strengthening, transfer training  Frequency of Treatment (PT Eval): 5 times per week                         Time Calculation:           PT Charges     Row Name 04/12/18 1631 04/12/18 1630          Time Calculation    Start Time 1430  -EE 0900  -EE     Stop Time 1500  -EE 0930  -EE     Time Calculation (min) 30 min  -EE 30 min  -EE     PT Received On 04/12/18  -EE 04/12/18  -EE     PT - Next Appointment 04/13/18  -EE 04/12/18  -EE     PT Goal Re-Cert Due Date   -- 04/19/18  -EE       User Key  (r) = Recorded By, (t) = Taken By, (c) = Cosigned By    Initials Name Provider Type    OFELIA Bernstein, PT Physical Therapist            Therapy Charges for Today     Code Description Service Date Service Provider Modifiers Qty    83326835282  PT EVAL MOD COMPLEXITY 2 4/12/2018 Enma Bernstein, PT GP 1    52991338791 HC PT THER PROC EA 15 MIN 4/12/2018 Enma Bernstein, PT GP 3    [id number]  PT THER SUPP EA 15 MIN 4/12/2018 Enma Bernstein, PT GP 1                   Enma Bernstein, PT  4/12/2018

## 2018-04-12 NOTE — PLAN OF CARE
Problem: Skin Injury Risk (Adult)  Goal: Skin Health and Integrity  Outcome: Ongoing (interventions implemented as appropriate)   04/12/18 1347   Skin Injury Risk (Adult)   Skin Health and Integrity making progress toward outcome     CC diet - encourage intake  Daily mvi/min + Vit C  Offer snacks/supplements as needed.

## 2018-04-12 NOTE — PROGRESS NOTES
SECTION GG    Eating Performance: [place] sets up or cleans up; patient completes activity.  [place] assists only prior to or following the activity.    Eating Discharge Goals: [place]    Signed by: Miya Newman RN

## 2018-04-12 NOTE — PROGRESS NOTES
Case Management  Inpatient Rehabilitation Plan of Care and Discharge Plan Note    Rehabilitation Diagnosis:  SAH  Date of Onset:  3/27/18    Medical Summary:  Presented to ER with headache, AMS, and slurred speech, has hs  of cerebral aneurysm; /110;  Past Medical History: MRSA right ear, cataract sx; HTN, hyperlipid; pneumonitis,  asthma, wheezing; RA, neck pain, osteoporosis, right TKR; CKD, hyst;  constipation, gastritis, GERD, malabsorption, c-scope, EGD; cerebral aneurysm,  cervical neuropathy; hyperglycemia; leukopenia, lymphocytosis, vit d defic;  anxiety, depression    Plan of Care  Updated Problems/Interventions      Expected Intensity:  Average of 3 hours of therapy 5 days/week.  Interdisciplinary Team:  Interdisciplinary Team: Medical Supervision and 24 Hour Rehabilitation Nursing.,  Physical Therapy:, Occupational Therapy:, Speech and Language Therapy:, Social  Work, Therapeutic Recreation., Psychology.  Physical Therapy Intensity/Duration: 60 minutes/day, 5 days/week  Occupational Therapy Intensity/Duration: 60 minutes/day, 5 days/week  Speech Language Pathology  Intensity/Duration: 60 minutes/day, 5 days/week  Estimated Length of Stay/Anticipated Discharge Date: ELOS: 2 weeks  Anticipated Discharge Destination:  Anticipated discharge destination from inpatient rehabilitation is community  discharge with assistance. Home with spouse      Based on the patient's medical and functional status, their prognosis and  expected level of functional improvement is:  SBA    Signed by: Alfredo Salas RN

## 2018-04-12 NOTE — PROGRESS NOTES
Inpatient Rehabilitation Functional Measures Assessment and Plan of Care    Plan of Care  Updated Problems/Interventions  Cognition    [ST] Executive Functions(Active)  Current Status(04/12/2018): mild deficits  Weekly Goal(04/20/2018): follow written schedule I'ly  Discharge Goal: Functional cognition for home with intermittent supervision.    Functional Measures  [facility] Eating:  [facility] Grooming: [facility] Bathing:  [facility] Upper Body Dressing:  [facility] Lower Body Dressing:  [facility] Toileting:  [facility] Bladder Management  Level of Assistance:  [place]  Frequency/Number of Accidents this Shift:  [facility] Bowel Management  Level of Assistance: [place]  Frequency/Number of Accidents this Shift: [facility] Bed/Chair/Wheelchair Transfer:  [facility] Toilet Transfer:  [facility] Tub/Shower Transfer:  [place]    Previously Documented Mode of Locomotion at Discharge: Field  BERONICA Expected Mode of Locomotion at Discharge: [facility] Walk/Wheelchair:  [facility] Stairs:  [facility] Comprehension:  Auditory comprehension is the usual mode. Comprehension  Score = 6, Modified [place].  Patient comprehends complex/abstract  information in their primary language, requiring:  [place] Expression:  Vocal expression is the usual mode. Expression Score = 6,  Modified Independent.  Patient expresses complex/abstract information in their  primary language, requiring:  [facility] Social Interaction:  Social Interaction Score = 7, Independent. Patient is  completely independent for social interaction.  There are no activity  limitations.  [place] Problem Solving:  Patient does not make appropriate decisions in order to  solve complex problems without assistance from a helper. Problem Solving Score =  4, Minimal Direction. Patient makes appropriate decisions in order to solve  routine problems 75-90% of the time. Patient requires minimal/occasional  direction for the following behavior(s):  BERONICA Memory:  Memory Score =  4, Minimal Prompting. Patient recognizes and  remembers 75-90% of the time. Patient requires minimal/occasional prompting for  memory for the following:    Therapy Mode Minutes  Occupational Therapy: Branch  Physical Therapy: Branch  Speech Language Pathology:  [place]anch    Signed by: Keren Mckinley SLP

## 2018-04-12 NOTE — PROGRESS NOTES
Section B. Hearing, Speech, Vision: Expression of Ideas and Wants: Expresses  complex messages without difficulty and with speech that is clear and easy to  understand.  Understanding Verbal Content: Understands: Clear comprehension without cues or  repetitions.    Section C. Cognitive Patterns: Brief Interview for Mental Status (BIMS) was  conducted.  Repetition of Three Words: Three words  Temporal Orientation Year: Correct  Temporal Orientation Month: Accurate within 5 days  Temporal Orientation Day of Week: Correct  Recall Socks: Yes, no cue required  Recall Blue: Yes, no cue required  Recall Bed: Yes, no cue required  BIMS SUMMARY SCORE: 15 Cognitively intact Patient was able to complete the Brief  Interview for Mental Status    Signed by: Keren Mckinley, SLP

## 2018-04-12 NOTE — PROGRESS NOTES
SECTION GG    Self Care Performance:   Oral Hygiene: [place] sets up or cleans up; patient completes activity. [place]  assists only prior to or following the activity.   Toileting Hygiene: [place] does less than half the effort. [place] lifts, holds  or supports trunk or limbs but provides less than half the effort.   Shower/Bathe Self: [place] does less than half the effort. [place] lifts, holds  or supports trunk or limbs but provides less than half the effort.   Upper Body Dressing: [place] does less than half the effort. [place] lifts, holds  or supports trunk or limbs but provides less than half the effort.   Lower Body Dressing: [place] does less than half the effort. [place] lifts, holds  or supports trunk or limbs but provides less than half the effort.   Putting On/Taking Off Footwear: [place] does less than half the effort. [place]  lifts, holds or supports trunk or limbs but provides less than half the effort.    Self Care Discharge Goals:   Putting On/Taking Off Footwear: [place] provides verbal cues or  touching/steadying assistance as patient completes activity.    Mobility Toilet Transfer Performance: [place] provides verbal cues or  touching/steadying assistance as patient completes activity.    Mobility Toilet Transfer Discharge Goal: [place] provides verbal cues or  touching/steadying assistance as patient completes activity.    Signed by: Margo Jacob OTR/YUSEF

## 2018-04-12 NOTE — PLAN OF CARE
Problem: Patient Care Overview  Goal: Plan of Care Review   04/12/18 1536   OTHER   Outcome Summary Pt presents with some generalized weakness and some slight confusion. Pt currently CGA to walk to bathroom and min A for commode transfer. Does need increased A for LBD due to weakness and wound vac. Pt would benefit from OT to address deficits.

## 2018-04-12 NOTE — PLAN OF CARE
Problem: Patient Care Overview  Goal: Plan of Care Review  Outcome: Ongoing (interventions implemented as appropriate)   04/12/18 0330   Patient Care Overview   IRF Plan of Care Review progress ongoing, continue   Progress, Functional Goals demonstrating adequate progress   Coping/Psychosocial   Plan of Care Reviewed With patient   OTHER   Outcome Summary Patient admitted tonight at 2044.  is supportive of patient and patient has a son that is a radiologist who is also supportive. Wound vac connected and set to 125m Hg. Medication given. Patient has sever bruising on her arms, legs, lower abdomen, back and R flank. PICC SHARYN. No c/o pain, no unsafe behaviors.       Problem: Skin Injury Risk (Adult)  Goal: Identify Related Risk Factors and Signs and Symptoms  Outcome: Outcome(s) achieved Date Met: 04/12/18 04/12/18 0330   Skin Injury Risk (Adult)   Related Risk Factors (Skin Injury Risk) advanced age;infection;mobility impaired;moisture     Goal: Skin Health and Integrity  Outcome: Ongoing (interventions implemented as appropriate)   04/12/18 0330   Skin Injury Risk (Adult)   Skin Health and Integrity making progress toward outcome       Problem: Fall Risk (Adult)  Goal: Identify Related Risk Factors and Signs and Symptoms  Outcome: Ongoing (interventions implemented as appropriate)   04/12/18 0330   Fall Risk (Adult)   Related Risk Factors (Fall Risk) fatigue/slow reaction;gait/mobility problems;neuro disease/injury;environment unfamiliar   Signs and Symptoms (Fall Risk) presence of risk factors     Goal: Absence of Fall  Outcome: Ongoing (interventions implemented as appropriate)   04/12/18 0330   Fall Risk (Adult)   Absence of Fall making progress toward outcome       Problem: Mobility, Physical Impaired (Adult)  Goal: Identify Related Risk Factors and Signs and Symptoms  Outcome: Ongoing (interventions implemented as appropriate)   04/12/18 0330   Mobility, Physical Impaired (Adult)   Related Risk Factors  (Physical Mobility, Impaired) functional decline   Signs and Symptoms (Physical Mobility Impaired) decreased balance     Goal: Enhanced Mobility Skills  Outcome: Ongoing (interventions implemented as appropriate)   04/12/18 0330   Mobility, Physical Impaired (Adult)   Enhanced Mobility Skills making progress toward outcome     Goal: Enhanced Functional Ability  Outcome: Ongoing (interventions implemented as appropriate)   04/12/18 0330   Mobility, Physical Impaired (Adult)   Enhanced Functional Ability making progress toward outcome

## 2018-04-12 NOTE — PROGRESS NOTES
Completed assessment with patient, , and son, Lonnie. Patient lives with  in tri-level home. Bedroom and bathroom are upstairs.  stated they have a lower level with no steps to enter and there is a bedroom and bathroom on that level.  in good health. Patient was independent prior to hospitalization. D/C plan is home with . Son also willing for patient to stay with him in [place] if needed. Discussed team and family conference. Will assist with plans.

## 2018-04-12 NOTE — THERAPY EVALUATION
Inpatient Rehabilitation - Occupational Therapy Initial Evaluation    [facility]     Patient Name: Herlinda Ta  : 1940  MRN: [id number]    Today's Date: 2018                 Admit Date: 2018       No diagnosis found.    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   • Cerebral vasospasm   • Cervical pain (neck)   • Depressed   • Depression   • Dyspepsia   • Acid reflux   • Groin hematoma   • HTN, goal below 140/90   • HLD (hyperlipidemia)   • Infection of artery   • Iron deficiency anemia   • MRSA (methicillin resistant staph aureus) culture positive   • Rheumatoid arthritis   • Urinary incontinence, mixed   • Subarachnoid hemorrhage       Past Medical History:   Diagnosis Date   • Depression    • GERD (gastroesophageal reflux disease)    • HLD (hyperlipidemia) 2018   • Hypertension        No past surgical history on file.         IRF OT ASSESSMENT FLOWSHEET (last 72 hours)      IRF Occupational Therapy Evaluation     Row Name 18 1524                   Evaluation/Treatment Time and Intent    Document Type evaluation  -SO        Mode of Treatment occupational therapy  -SO        Patient/Family Observations Pt supine in bed in am and pm sessions  -SO        Row Name 18 1524                   Cognition/Psychosocial- PT/OT    Affect/Mental Status (Cognitive) WNL  -SO        Orientation Status (Cognition) oriented x 4  -SO        Follows Commands (Cognition) WNL  -SO        Cognitive Function (Cognitive) attention deficit  -SO        Row Name 18 1524                   Bed Mobility Assessment/Treatment    Bed Mobility Assessment/Treatment supine-sit  -SO        Supine-Sit [place] (Bed Mobility) minimum assist (75% patient effort);verbal cues  -SO        Row Name  04/12/18 1524                   Transfer Assessment/Treatment    Transfer Assessment/Treatment sit-stand transfer;stand-sit transfer;toilet transfer  -SO        Sit-Stand [place] (Transfers) contact guard;verbal cues  -SO        Stand-Sit [place] (Transfers) contact guard;verbal cues  -SO        [place] Level (Toilet Transfer) minimum assist (75% patient effort);contact guard;verbal cues  -SO        Assistive Device (Toilet Transfer) grab bars/safety frame  -SO        Row Name 04/12/18 1524                   Toilet Transfer    Type (Toilet Transfer) stand pivot/stand step  -SO        Row Name 04/12/18 1524                   Safety Issues, Functional Mobility    Comment, Safety Issues/Impairments (Mobility) Walked HHA/CGA without AD to bathroom, several small LOB  -SO        Row Name 04/12/18 1524                   Basic Activities of Daily Living (BADLs)    Basic Activities of Daily Living bathing;upper body dressing;lower body dressing;grooming;toileting  -SO        Row Name 04/12/18 1524                   Bathing Assessment/Treatment    Bathing [place] Level upper body;set up;lower body;minimum assist (75% patient effort);verbal cues  -SO        Bathing Position supported sitting;supported standing  -SO        Row Name 04/12/18 1524                   Upper Body Dressing Assessment/Treatment    Upper Body Dressing Task doff;don;bra/undergarment;pull over garment;minimum assist (75% or more patient effort);verbal cues  -SO        Upper Body Dressing Position supported sitting  -SO        Row Name 04/12/18 1524                   Lower Body Dressing Assessment/Treatment    Lower Body Dressing [place] Level doff;don;pants/bottoms;shoes/slippers;socks;underwear;moderate assist (50% patient effort);verbal cues  -SO        Lower Body Dressing Position supported sitting;supported standing  -SO        Row Name 04/12/18 1524                   Grooming Assessment/Treatment    Grooming [place]  Level deodorant application;hair care, combing/brushing;wash face, hands;set up;verbal cues  -SO        Grooming Position sink side  -SO        [facility] Name 04/12/18 1524                   Toileting Assessment/Treatment    Toileting [place] Level toileting skills;moderate assist (50% patient effort);verbal cues;nonverbal cues (demo/gesture)  -SO        Assistive Device Use (Toileting) grab bar/safety frame;raised toilet seat  -SO        Toileting Position supported sitting;supported standing  -SO        Comment (Toileting) Leaning posteriorly seated on toiliet; performed in am and pm  -SO        Row Name 04/12/18 1524                   General ROM    GENERAL ROM COMMENTS BUE WFL  -SO        [facility] Name 04/12/18 1524                   MMT (Manual Muscle Testing)    Comment, General Manual Muscle Testing (MMT) Assessment BUE generalized UE weakness 3+/5  -SO        [facility] Name 04/12/18 1524                   Motor Assessment/Intervention    Additional Documentation Fine Motor Testing & Training (Group);Gross Motor Coordination (Group);Hand  Strength Testing (Group)  -SO        [facility] Name 04/12/18 1524                   Hand  Strength Testing    Right Hand, Setting 2 (Dynamometer Testing) 19  -SO        Left Hand, Setting 2 (Dynamometer Testing) 23  -SO        Right Hand: Lateral (Key) Pinch Strength (Pinch Dynamometer Testing) 8  -SO        Left Hand: Lateral (Key) Pinch Strength (Pinch Dynamometer Testing) 7  -SO        [facility] Name 04/12/18 1524                   Fine Motor Testing & Training    Fine Motor Tests Box N Block Test Results  -SO        Results, Box N Block Test-Right 42  -SO        Results, Box N Block Test-Left 42  -SO        Comment, Fine Motor Coordination Difficulty following commands for GMC tests  -SO        Row Name 04/12/18 1524                   OT Clinical Impression    General Observations of Patient Pt with wound vac to R groin, bruising  -SO        Patient's Goals For Discharge return  home;take care of myself at home  -SO        Rehab Potential/Prognosis: Occupational Therapy good, to achieve stated therapy goals  -SO        Frequency of Treatment (OT Eval) 60 minutes per session;5 times per week  -SO        Estimated Length of Stay (OT Eval) 2 weeks  -SO        Problem List: Occupational Therapy strength decreased;impaired cognition  -SO        Existing Precautions/Restrictions fall   Wound vac  -SO        Limitations/Impairments safety/cognitive  -SO        Planned Therapy Interventions (OT Eval) activity tolerance training;adaptive equipment training;cognitive/visual perception retraining;BADL retraining;occupation/activity based interventions;strengthening exercise  -SO        Row Name 04/12/18 1524                   IRF OT Goals    Transfer Goal Selection (OT-IRF) transfers, OT goal 1  -SO        LB Dressing Goal Selection (OT-IRF) LB dressing, OT goal 1  -SO        Toileting Goal Selection (OT-IRF) toileting, OT goal 1  -SO        Strength Goal Selection (OT-IRF) strength, OT goal 1 (free text)  -SO        Endurance Goal Selection (OT) endurance, OT goal 1  -SO        Caregiver Training Goal Selection (OT-IRF) caregiver training, OT goal 1  -SO        Additional Documentation Toileting Goal Selection (OT-IRF) (Row);Transfer Goal Selection (OT-IRF) (Row);Strength Goal Selection (OT-IRF) (Row);Endurance Goal Selection (OT) (Row);Caregiver Training Goal Selection (OT-IRF) (Row)  -SO        Row Name 04/12/18 1524                   Transfer Goal 1 (OT-IRF)    Activity/Assistive Device (Transfer Goal 1, OT-IRF) toilet;shower chair;walk-in shower;tub  -SO        [place] Level (Transfer Goal 1, OT-IRF) supervision required;verbal cues required  -SO        Time Frame (Transfer Goal 1, OT-IRF) long term goal (LTG);2 weeks  -SO        Row Name 04/12/18 1524                   LB Dressing Goal 1 (OT-IRF)    Activity/Device (LB Dressing Goal 1, OT-IRF) lower body dressing  -SO        [place]  (LB Dressing Goal 1, OT-IRF) supervision required  -SO        Time Frame (LB Dressing Goal 1, OT-IRF) long term goal (LTG);2 weeks  -SO        Row Name 04/12/18 1524                   Toileting Goal 1 (OT-IRF)    Activity/Device (Toileting Goal 1, OT-IRF) toileting skills, all;grab bar/safety frame  -SO        [place] Level (Toileting Goal 1, OT-IRF) supervision required  -SO        Time Frame (Toileting Goal 1, OT-IRF) long term goal (LTG);2 weeks  -SO        Row Name 04/12/18 1524                   Strength Goal 1 (OT-IRF)    Strength Goal 1 (OT-IRF) Pt to increase overall BUE strength to 4-/5 to assist with functional activities and ADLs.  -SO        Time Frame (Strength Goal 1, OT-IRF) long term goal (LTG);2 weeks  -SO        Row Name 04/12/18 1524                    Endurance Goal 1 (OT)    Endurance Goal 1 (OT) Pt to increase overall endurance to tolerate 5 min activity.  -SO        Activity Level (Endurance Goal 1, OT) to increase;endurance 2 fair +  -SO        Time Frame (Endurance Goal 1, OT) long term goal (LTG);2 weeks  -SO        Row Name 04/12/18 1524                   Caregiver Training Goal 1 (OT-IRF)    Caregiver Training Goal 1 (OT-IRF) Pt and caregiver to be independent with AE, HEP, home safety, etc. at d/c.  -SO        Time Frame (Caregiver Training Goal 1, OT-IRF) long term goal (LTG);2 weeks  -SO        Row Name 04/12/18 1524                   Positioning and Restraints    Pre-Treatment Position in bed  -SO        Post Treatment Position wheelchair  -SO        In Wheelchair sitting   With PT in am, in DR in pm  -SO          User Key  (r) = Recorded By, (t) = Taken By, (c) = Cosigned By    Initials Name Effective Dates    SO Margo Jacob, OTR 04/13/15 -              Occupational Therapy Education     Title: PT OT SLP Therapies (Active)     Topic: Occupational Therapy (Active)     Point: ADL training (Done)     Description: Instruct learner(s) on proper safety adaptation and  remediation techniques during self care or transfers.   Instruct in proper use of assistive devices.   Learning Progress Summary     Learner Status Readiness Method Response Comment Documented by    Patient Done Acceptance E VU OT POC, goals SO 04/12/18 1536    Family Done Acceptance E VU OT POC, goals SO 04/12/18 1536                      User Key     Initials Effective Dates Name Provider Type Discipline    SO 04/13/15 -  ANNIE Moscoso Occupational Therapist OT                    OT Recommendation and Plan    Planned Therapy Interventions (OT Eval): activity tolerance training, adaptive equipment training, cognitive/visual perception retraining, BADL retraining, occupation/activity based interventions, strengthening exercise       Outcome Summary: Pt presents with some generalized weakness and some slight confusion. Pt currently CGA to walk to bathroom and min A for commode transfer. Does need increased A for LBD due to weakness and wound vac. Pt would benefit from OT to address deficits.            Time Calculation:     OT Start Time: 1230  OT Stop Time: 1300  OT Time Calculation (min): 30 min      Therapy Charges for Today     Code Description Service Date Service Provider Modifiers Qty    79674552730 HC OT EVAL MOD COMPLEXITY 2 4/12/2018 Margo Jacob OTR GO 1    23573883586 HC OT SELF CARE/MGMT/TRAIN EA 15 MIN 4/12/2018 Margo Jacob OTR GO 2    76470608178 HC OT THER PROC EA 15 MIN 4/12/2018 Margo Jacob OTR GO 1    14438010242 HC OT NEUROMUSC RE EDUCATION EA 15 MIN 4/12/2018 ANNIE Moscoso GO 1                   ANNIE Moscoso  4/12/2018

## 2018-04-12 NOTE — PROGRESS NOTES
Inpatient Rehabilitation Plan of Care Note    Plan of Care  Care Plan Reviewed - Updates as Follows    Body Function Structure    [RN] Skin Integrity(Active)  Current Status(04/11/2018): Right groin wound VAC; bruising all over  Weekly Goal(04/18/2018): No further skin breakdown  Discharge Goal: same as weekly        Psychosocial    [RN] Coping/Adjustment(Active)  Current Status(04/11/2018): Patient has a supportive family  and son;  appears to be coping well with current status  Weekly Goal(04/18/2018): Patient will cope adequately regarding current status  and demonstrate healthy coping strategies  Discharge Goal: Same as weekly        Safety    [RN] Potential for Injury(Active)  Current Status(04/11/2018): Subarachnoid hemorrhage; Left leg weaker than right  Weekly Goal(04/18/2018): Will use call light for assistance; no falls  Discharge Goal: No falls        Sphincter Control    [RN] Bladder Management(Active)  Current Status(04/11/2018): Incontinent of bladder 100% per  report  Weekly Goal(04/18/2018): Continent 50%  Discharge Goal: Continent 100%    [RN] Bowel Management(Active)  Current Status(04/11/2018): Continent 100%  Weekly Goal(04/18/2018): Continent 100%  Discharge Goal: Continent 100%    Signed by: Matty Helms RN

## 2018-04-12 NOTE — PROGRESS NOTES
Inpatient Rehabilitation Plan of Care Note    Plan of Care  Care Plan Reviewed - No updates at this time.    Safety    Performed Intervention(s)  Safety rounds; call light/items within easy reach  Bed Alarm      Body Function Structure    Performed Intervention(s)  Skin assessment every shift  Dressing changes/Wound Care as order  Turning      Sphincter Control    Performed Intervention(s)  Monitor I&O  Timed voids  Use incontinent products      Psychosocial    Performed Intervention(s)  Encourage to verbalize concerns  Offer diversional activities    Signed by: Miya Newman RN

## 2018-04-12 NOTE — PROGRESS NOTES
Inpatient Rehabilitation Functional Measures Assessment    Functional Measures  BERONICA Eating:  [facility] Grooming: [facility] Bathing:  [facility] Upper Body Dressing:  [facility] Lower Body Dressing:  [facility] Toileting:  [facility] Bladder Management  Level of Assistance:  [place]  Frequency/Number of Accidents this Shift:  [facility] Bowel Management  Level of Assistance: [place]  Frequency/Number of Accidents this Shift: [facility] Bed/Chair/Wheelchair Transfer:  [facility] Toilet Transfer:  [facility] Tub/Shower Transfer:  [place]    Previously Documented Mode of Locomotion at Discharge: [place]  BERONICA Expected Mode of Locomotion at Discharge: [facility] Walk/Wheelchair:  [facility] Stairs:  [facility] Comprehension:  Auditory comprehension is the usual mode. Comprehension  Score = 6, Modified [place].  Patient comprehends complex/abstract  information in their primary language with only mild difficulty.  BERONICA Expression:  Vocal expression is the usual mode. Expression Score = 6,  Modified Independent.  Patient expresses complex/abstract information in their  primary language with only mild difficulty with tasks.  BERONICA Social Interaction:  Social Interaction Score = 6, Modified Independent.  Patient is modified independent for social interaction, requiring: Requires  additional time.  BERONICA Problem Solving:  Activity was not observed.  BERONICA Memory:  Memory Score = 4, Minimal Prompting. Patient recognizes and  remembers 75-90% of the time. Patient requires minimal/occasional prompting for  memory for the following:    Therapy Mode Minutes  Occupational Therapy: [place]  Physical Therapy: [place]  Speech Language Pathology:  [place]    Signed by: Blaire Rapp RN

## 2018-04-12 NOTE — PROGRESS NOTES
Clarified with [facility] pharmacy - Ceftriazone started 4-7 and Metronidazole started 4-11 - will do stop date May 9.    Patient's SBP goal is 120-180 per Neurosurgery.  SBP was averaging 140's and ranged 120's to 180's recently at [facility].  She was on Hydralazine 25 mg q  8 hours scheduled and Hydralazine 10 mg IV q 4 hours prn SBP >180 or DBP > 100 with last dose 4-11-18 at 07:35 there.  At discharge she was restarted on Lasix 20 mg daily, Metoprolol 100 mg daily and Doxazosin 2 mg HS , which she was not on there.  SBP was 168 last PM and 118 this AM.  Will continue Hydralazine scheduled and po prn and Lasix, but hold additional Metoprolol and Doxazosin for now to avoid excessive BP reduction s/p aneurysm stent and s/p vasospasm. She completed Nimotop yesterday.

## 2018-04-12 NOTE — THERAPY EVALUATION
Inpatient Rehabilitation - Speech Language Pathology Initial Evaluation  [facility]     Patient Name: Herlinda Ta  : 1940  MRN: [id number]  Today's Date: 2018               Admit Date: 2018     Speech-Language/Cognitive-Linguistic Evaluation  Subjective: The patient was seen on this date for a Cognitive-Linguistic Evaluation.  Patient was alert and cooperative.    The patient's history is significant for SAH.   Objective: CLQT: The Cognitive Linguistic Quick Test (CLQT) was developed for use with English or Macedonian speaking adults with acquired neurological dysfunction, ages 18-89. It is an individually administered test designed to gain information about cognitive-linguistic functioning. The test is criterion-referenced and yields severity ratings for 5 cognitive domains, a Total Composite Severity Rating, and a Clock Drawing Severity Rating. The CLQT consists of 10 tasks: personal facts, symbol cancellation, confrontation naming, clock drawing, story retelling, symbol trails, generative naming, design memory, mazes, and design generation. Cognitive Domain Scores are plotted on a rating scale of 1-4, with 1 being Severe and 4 being WFL.   Domain Score Severity Rating   Attention 162 4 (WNL)   Memory 147 4 (WNL)   Executive Functions 21 4 (WNL)   Language 34 4 (WNL)   Visuospatial Skills 64 4 (WNL)         Total Score Severity Rating   Clock Drawing  9 Mild     Clock Drawing Comments:    Severity Rating Severity Rating Range   Composite   4.0 4.0 - 3.5 (WNL)   CLQT Comments: Although test scores were WNL, pt showed slow processing for all tasks. Difficulty was noted for planning tasks especially.     Assessment: The patient demonstrated impaired cognition. Deficits were noted in planning and high level organization tasks. Pt is aware of her difficulties and is motivated to work in therapy.  Recommendations:   Cognitive-Linguistic Therapy.  Other Recommended Evaluations: Swallow was  screened with no s/s of aspiration noted.    Speech Therapy is recommended. Rationale to improve cognitive skills to return to independence in home environment.  Visit Dx:  No diagnosis found.  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   • Cerebral vasospasm   • Cervical pain (neck)   • Depressed   • Depression   • Dyspepsia   • Acid reflux   • Groin hematoma   • HTN, goal below 140/90   • HLD (hyperlipidemia)   • Infection of artery   • Iron deficiency anemia   • MRSA (methicillin resistant staph aureus) culture positive   • Rheumatoid arthritis   • Urinary incontinence, mixed   • Subarachnoid hemorrhage     Past Medical History:   Diagnosis Date   • Depression    • GERD (gastroesophageal reflux disease)    • HLD (hyperlipidemia) 4/12/2018   • Hypertension      History reviewed. No pertinent surgical history.       SLP EVALUATION (last 72 hours)      SLP SLC Evaluation     Row Name 04/12/18 1600                   Communication Assessment/Intervention    Document Type evaluation  -AW        Subjective Information complains of;fatigue  -AW        Patient Observations alert;cooperative;agree to therapy  -AW        Patient Effort good  -AW        Symptoms Noted During/After Treatment fatigue  -AW           General Information    Patient Profile Reviewed yes  -AW        Precautions/Limitations, Vision WFL  -AW        Precautions/Limitations, Hearing WFL  -AW        Prior Level of Function-Communication WFL  -AW        Plans/Goals Discussed with patient;family;agreed upon  -AW        Barriers to Rehab none identified  -AW        Patient's Goals for Discharge return to all previous roles/activities  -AW        Family Goals for Discharge patient able to return to previous activities/roles  -AW        Standardized  Assessment Used CLQT  -AW           Pain Assessment    Additional Documentation Pain Scale: Numbers Pre/Post-Treatment (Group)  -AW           Pain Scale: Numbers Pre/Post-Treatment    Pain Scale: Numbers, Pretreatment 0/10 - no pain  -AW           Oral Motor Structure and Function    Oral Motor Structure and Function WNL  -AW           Oral Musculature and Cranial Nerve Assessment    Oral Motor General Assessment WFL  -AW        Oral Motor, Comment Swallow screened with no s/s of aspiration noted.  -AW           Cognitive Assessment Intervention- SLP    Cognitive Function (Cognition) mild impairment;other (see comments)   See results of CLQT for details  -AW        Orientation Status (Cognition) WFL  -AW           SLP Clinical Impressions    SLP Diagnosis Mild cognitive impairment  -AW        Rehab Potential/Prognosis good  -AW        Criteria for Skilled Therapy Interventions Met yes  -AW        Functional Impact difficulty completing home management task  -AW           Recommendations    Therapy Frequency (SLP SLC) 5 days per week  -AW        Predicted Duration Therapy Intervention (Days) until discharge  -AW        Anticipated Dischage Disposition home with assist  -AW           Cognitive Linguistic Treatment Objectives    Memory Skills Selection memory skills, SLP goal 1  -AW        Organizational Skills Selection organizational skills, SLP goal 1  -AW        Reasoning Selection reasoning, SLP goal 1  -AW        Functional Math Skills Selection functional math skills, SLP goal 1  -AW        Executive Function Skills Selection executive function skills, SLP goal 1  -AW           Memory Skills Goal 1 (SLP)    Improve Memory Skills Through Goal 1 (SLP) recalling unrelated word lists with an imposed delay;visual memory task;90%;independently (over 90% accuracy);listen to a paragraph and answer questions;read a paragraph and answer questions;listen to multiple paragraphs and answer questions;read multiple paragraphs  and answer questions;recall details of the day  -AW        Time Frame (Memory Skills Goal 1, SLP) by discharge  -AW           Organizational Skills Goal 1 (SLP)    Improve Thought Organization Through Goal 1 (SLP) completing a divergent naming task;completing a convergent naming task;completing a verbal sequencing task;completing mental manipulation task;90%;independently (over 90% accuracy)  -AW        Time Frame (Thought Organization Skills Goal 1, SLP) by discharge  -AW           Reasoning Goal 1 (SLP)    Improve Reasoning Through Goal 1 (SLP) complete high level reasoning task;complete deductive reasoning task;complete mental flexibility task;complete logic/creative thinking task;90%;independently (over 90% accuracy)  -AW        Time Frame (Reasoning Goal 1, SLP) by discharge  -AW           Functional Math Skills Goal 1 (SLP)    Improve Functional Math Skills Through Goal 1 (SLP) complete functional math task;complete word problems involving time;complete word problems involving money;complete checkbook task;90%;independently (over 90% accuracy)  -AW        Time Frame (Functional Math Skills Goal 1, SLP) by discharge  -AW           Executive Functional Skills Goal 1 (SLP)    Improve Executive Function Skills Goal 1 (SLP) organization/planning activity;time management activity;complex organization/planning activity;home management activity;perform self-correction;exhibit cognitive flexibility;90%;independently (over 90% accuracy)  -AW        Time Frame (Executive Function Skills Goal 1, SLP) by discharge  -AW          User Key  (r) = Recorded By, (t) = Taken By, (c) = Cosigned By    Initials Name Effective Dates    AW Keren Mckinley MS [facility]-SLP 04/13/15 -                EDUCATION  The patient has been educated in the following areas:     Cognitive Impairment Communication Impairment.    SLP Recommendation and Plan    SLP Diagnosis: Mild cognitive impairment    Rehab Potential/Prognosis: good    Criteria for Skilled  Therapy Interventions Met: yes    Anticipated Dischage Disposition: home with assist              Predicted Duration Therapy Intervention (Days): until discharge                         SLP GOALS     Row Name 04/12/18 1600             Memory Skills Goal 1 (SLP)    Improve Memory Skills Through Goal 1 (SLP) recalling unrelated word lists with an imposed delay;visual memory task;90%;independently (over 90% accuracy);listen to a paragraph and answer questions;read a paragraph and answer questions;listen to multiple paragraphs and answer questions;read multiple paragraphs and answer questions;recall details of the day  -AW      Time Frame (Memory Skills Goal 1, SLP) by discharge  -AW         Organizational Skills Goal 1 (SLP)    Improve Thought Organization Through Goal 1 (SLP) completing a divergent naming task;completing a convergent naming task;completing a verbal sequencing task;completing mental manipulation task;90%;independently (over 90% accuracy)  -AW      Time Frame (Thought Organization Skills Goal 1, SLP) by discharge  -AW         Reasoning Goal 1 (SLP)    Improve Reasoning Through Goal 1 (SLP) complete high level reasoning task;complete deductive reasoning task;complete mental flexibility task;complete logic/creative thinking task;90%;independently (over 90% accuracy)  -AW      Time Frame (Reasoning Goal 1, SLP) by discharge  -AW         Functional Math Skills Goal 1 (SLP)    Improve Functional Math Skills Through Goal 1 (SLP) complete functional math task;complete word problems involving time;complete word problems involving money;complete checkbook task;90%;independently (over 90% accuracy)  -AW      Time Frame (Functional Math Skills Goal 1, SLP) by discharge  -AW         Executive Functional Skills Goal 1 (SLP)    Improve Executive Function Skills Goal 1 (SLP) organization/planning activity;time management activity;complex organization/planning activity;home management activity;perform  self-correction;exhibit cognitive flexibility;90%;independently (over 90% accuracy)  -AW      Time Frame (Executive Function Skills Goal 1, SLP) by discharge  -AW        User Key  (r) = Recorded By, (t) = Taken By, (c) = Cosigned By    Initials Name Provider Type    NALDO Mckinley MS CCC-SLP Speech and Language Pathologist                 SLP Outcome Measures (last 72 hours)      SLP Outcome Measures     Row Name 04/12/18 1600             SLP Outcome Measures    Outcome Measure Used? Adult NOMS  -AW         FCM Scores    Memory FCM Score 4  -AW        User Key  (r) = Recorded By, (t) = Taken By, (c) = Cosigned By    Initials Name Effective Dates    NALDO Mckinley MS CCC-SLP 04/13/15 -               Time Calculation:           Time Calculation- SLP     Row Name 04/12/18 1639 04/12/18 0800          Time Calculation- SLP    SLP Start Time 1300  -AW 0930  -AW     SLP Stop Time 1400  -AW 1030  -AW     SLP Time Calculation (min) 60 min  -AW 60 min  -AW       User Key  (r) = Recorded By, (t) = Taken By, (c) = Cosigned By    Initials Name Provider Type    NALDO Mckinley MS CCC-SLP Speech and Language Pathologist          Therapy Charges for Today     Code Description Service Date Service Provider Modifiers Qty    91180509995 HC ST STD COG PERF TEST PER HOUR 4/12/2018 Keren Mckinley MS CCC-SLP GN 2             ADULT NOMS (last 72 hours)      Adult NOMS     Row Name 04/12/18 1600                   FCM Scores    Memory FCM Score 4  -AW          User Key  (r) = Recorded By, (t) = Taken By, (c) = Cosigned By    Initials Name Effective Dates    NALDO Mckinley MS CCC-SLP 04/13/15 -                Keren Mckinley MS CCC-WILLIAM  4/12/2018

## 2018-04-12 NOTE — H&P
Patient Care Team:  Stanislaw Esposito MD as PCP - General    Chief complaint SAH, opthalmic artery aneurysm s/p endovascular embolization. Right Groin hematoma, right groin wound infection s/p wound vac placement.     Subjective     Mrs. Ta is a pleasant 78 yo female with PMH of HTN, HLD, who presented to the hospital with mental status change along with headache. Head CT showed evidence of subarachnoid hemorrhage. She had left ophthalmic/paraophthalmic artery aneurysm. She was admitted to the Intensive Care Unit and was seen by neurosurgery. She was seen by Dr. Emilie Yeung. She underwent intra-arterial endovascular embolization of left ICA aneurysm using pipeline flow diverting embolization device. Postoperatively she was in Intensive Care Unit. She was started on aspirin and Plavix. She suffered from right groin hemorrhage. Vascular surgery was consulted. She had right groin hematoma and acute blood loss anemia requiring blood transfusion. She was taken for surgery by vascular surgery and she underwent arteriotomy common femoral artery, patch angioplasty with direct repair of right common femoral artery. She was closely watched in Intensive Care Unit. She was on Nimotop for cerebrovascular spasms. Wound care was monitored by vascular surgery. She was noted to have increased drainage from the right groin surgical site. She was taken for surgery again on April 4 and she was noted to have wound infection. She underwent wound debridement with wound VAC placement. She did suffer from blood loss anemia and required blood transfusion again. Her cerebrovascular vasospasms had been controlled. She is also on Vimpat and Keppra. She was on Decadron and currently will be on Decadron tapering course. Wound cultures grew Escherichia coli along with anaerobic organisms including Peptoniphilus harei and Prevotella bivia. She was seen by infectious disease consultation and she has been recommended 4 weeks of  antibiotic therapy with IV ceftriaxone and oral Flagyl as patient does have endovascular prosthetic device in the right groin. Wound is stable. Wound VAC is present currently.        Review of Systems   Constitutional: Positive for activity change. Negative for chills, diaphoresis and fever.   HENT: Negative for congestion, hearing loss, trouble swallowing and voice change.    Eyes: Negative for photophobia and visual disturbance.   Respiratory: Negative for apnea, cough, chest tightness and shortness of breath.    Cardiovascular: Negative for chest pain and palpitations.   Gastrointestinal: Positive for constipation. Negative for abdominal pain, diarrhea and rectal pain.   Genitourinary: Positive for enuresis and urgency. Negative for difficulty urinating and dysuria.   Musculoskeletal: Positive for gait problem. Negative for arthralgias, back pain and myalgias.   Skin: Positive for color change and wound. Negative for rash.   Neurological: Positive for weakness. Negative for dizziness, facial asymmetry, light-headedness, numbness and headaches.   Psychiatric/Behavioral: Negative for agitation.   All other systems reviewed and are negative.       Past Medical History:   Diagnosis Date   • Depression    • GERD (gastroesophageal reflux disease)    • Hypertension      No past surgical history on file.   Pt reports hysterectomy     No family history on file.   Sister with unknown type of cancer. Three healthy children. Mother passed suddenly at age 46 from unknown condition.     Social History   Substance Use Topics   • Smoking status: Never Smoker   • Smokeless tobacco: Not on file   • Alcohol use Yes     Prescriptions Prior to Admission   Medication Sig Dispense Refill Last Dose   • aliskiren (TEKTURNA) 150 MG tablet Take 150 mg by mouth Daily.      • aspirin 81 MG chewable tablet Chew 81 mg Daily.      • azithromycin (ZITHROMAX Z-BAN) 250 MG tablet Take 2 tablets the first day, then 1 tablet daily for 4 days. 1  tablet 0    • escitalopram (LEXAPRO) 10 MG tablet Take 10 mg by mouth Daily.      • furosemide (LASIX) 20 MG tablet Take 20 mg by mouth 2 (Two) Times a Day.      • guaiFENesin-codeine (ROMILAR-AC) 100-10 MG/5ML syrup 5-10 cc's by mouth QID as needed for cough 240 mL 0    • hydrALAZINE (APRESOLINE) 50 MG tablet Take 50 mg by mouth 3 (Three) Times a Day.      • isosorbide mononitrate (IMDUR) 30 MG 24 hr tablet Take 30 mg by mouth Daily.      • meloxicam (MOBIC) 15 MG tablet Take 15 mg by mouth Daily.      • metoprolol tartrate (LOPRESSOR) 100 MG tablet Take 100 mg by mouth 2 (Two) Times a Day.      • omeprazole (priLOSEC) 20 MG capsule Take 20 mg by mouth Daily.        Allergies:  Iodinated diagnostic agents; Iodine; Levofloxacin; Amoxicillin; Leflunomide; and Amlodipine    Objective      Vital Signs  Temp:  [98.1 °F (36.7 °C)-98.2 °F (36.8 °C)] 98.2 °F (36.8 °C)  Heart Rate:  [76-94] 94  Resp:  [18] 18  BP: (118-168)/(46-80) 119/59    Physical Exam   Constitutional: She is oriented to person, place, and time. She appears well-developed. No distress.   HENT:   Head: Normocephalic and atraumatic.   Mouth/Throat: Oropharynx is clear and moist.   Eyes: EOM are normal. Pupils are equal, round, and reactive to light.   Neck: Normal range of motion.   Cardiovascular: Normal rate, regular rhythm and normal heart sounds.  Exam reveals no gallop and no friction rub.    No murmur heard.  Pulmonary/Chest: Effort normal and breath sounds normal.   Abdominal: Soft. Bowel sounds are normal. She exhibits no distension. There is no tenderness. There is no guarding.   Musculoskeletal: She exhibits no edema or deformity.   BLUE full AROM strength 4+/5 equal bilaterally   RLE limited AROM at hip, ROM WNL in remaining RLE  LLE full AROM strength 4+/5      Neurological: She is alert and oriented to person, place, and time. No cranial nerve deficit.   Skin: Skin is warm and dry. Ecchymosis noted.        Multiple areas of ecchymosis  including low back and right groin.    Right groin wound vac in place    Psychiatric: She has a normal mood and affect.       Results Review:   I reviewed the patient's new clinical results.      Assessment/Plan     Active Problems:    * No active hospital problems. *      Assessment:    Condition: In stable condition.  Improving.       Plan:   Discharge home.  Encourage ambulation.  Regular diet.  Resume oral medications.       SAH s/p endovascular embolization left opthalmic artery  - On Decadron taper  - Vimpat and Keppra for seizure ppx  - On ASA and Plavix    - Resolving on MR 4/8  - Edema noted on MR   - Initiated comprehensive PT/OT/SLP program    Left groin wound vac  - Change wound vac Mon Wed Fri   - On IV Rocephin and PO flagyl for total 4 wks last dose on 5/9    HTN   - Continue home medications   - Monitor BP   - Per acute care team recommendations maintain SBP btwn 120 - 180  - will hold BB and terazosin   - Continue hydralazine and daily lasix     Recent acute blood loss   - monitor Hgb   - BT if Hgb <7. 9.3 on 4/12     Urinary incontinence   - Timed void   - Bladder training   - Holding terazosin at this time     History of depression   - Continue home lexapro    FEN  - Vit D3, B-6, Vit C, Multi Vit, Folic acid supplementation     DVT PPX  - Chemo prophylaxis contraindicated in the setting of SAH and recent groin hematoma with blood loss anemia   - SCD     GI PPX   - Protonix 40 mg daily     I discussed the patients findings and my recommendations with patient    Grabiel Fontenot MD  04/12/18  8:13 AM    Time: More than 50% of time spent in counseling and coordination of care:  Total face-to-face/floor time 30 min.  Time spent in counseling 60 min. Counseling included the following topics: SDH, corticosteroid use, safety with mobility, falls prevention, stress incontinence.

## 2018-04-12 NOTE — PROGRESS NOTES
SECTION GG    Mobility Performance:   Roll Left and Right: [facility] provides verbal cues or touching/steadying  assistance as patient completes activity.   Sit to Lying: [facility] provides verbal cues or touching/steadying assistance as  patient completes activity.   Lying to Sitting on Side of Bed: [facility] does less than half the effort. [facility]  lifts, holds or supports trunk or limbs but provides less than half the effort.   Sit to Stand: [facility] does less than half the effort. [facility] lifts, holds or  supports trunk or limbs but provides less than half the effort.   Chair/Bed to Chair Transfer: [facility] provides verbal cues or touching/steadying  assistance as patient completes activity.   Car Transfer: [facility] does less than half the effort. [facility] lifts, holds or  supports trunk or limbs but provides less than half the effort.    Patient Walks: Yes   Walk 10 Feet: [facility] provides verbal cues or touching/steadying assistance as  patient completes activity.   Walk 50 Feet With 2 Turns: [facility] provides verbal cues or touching/steadying  assistance as patient completes activity.   Walk 150 Feet: [facility] provides verbal cues or touching/steadying assistance as  patient completes activity.   Walking 10 Feet on Uneven Surfaces: Not attempted due to medical or safety  concerns.   1 Step Over Curb or Up/Down Stair: [facility] does less than half the effort.  [facility] lifts, holds or supports trunk or limbs but provides less than half the  effort.   4 Steps Up and Down, With/Without Rail: [facility] does less than half the effort.  [facility] lifts, holds or supports trunk or limbs but provides less than half the  effort.   12 Steps Up and Down, With/Without Rail: Not attempted due to medical or safety  concerns.   Picking up an Object: Not attempted due to medical or safety concerns.      Uses Wheelchair/Scooter: No    Mobility Discharge Goals:   Roll Left and Right: Patient completed the activities by him/herself with no  assistance from a  helper.    Signed by: ROMA ElliottT

## 2018-04-12 NOTE — PROGRESS NOTES
Inpatient Rehabilitation Functional Measures Assessment and Plan of Care    Plan of Care  Updated Problems/Interventions  Mobility    [PT] Bed/Chair/Wheelchair(Active)  Current Status(04/12/2018): CGA w/wx  Weekly Goal(04/20/2018): SBA  Discharge Goal: supervision    [PT] Walk(Active)  Current Status(04/12/2018): CGA/Min 160' rwx  Weekly Goal(04/20/2018): CGA to BR rwx  Discharge Goal: SBA/supervision 160' rwx    [PT] Bed Mobility(Active)  Current Status(04/12/2018): MIN  Weekly Goal(04/20/2018): SBA  Discharge Goal: independent    [PT] Stairs(Active)  Current Status(04/12/2018): min 4 with HRs  Weekly Goal(04/20/2018): PT only  Discharge Goal: supervision 12 w/HRs    Functional Measures  BERONICA Eating:  Branch  [facility] Grooming: Branch  [facility] Bathing:  Branch  [facility] Upper Body Dressing:  [place]anch  [facility] Lower Body Dressing:  [place]  [facility] Toileting:  [facility] Bladder Management  Level of Assistance:  [place]  Frequency/Number of Accidents this Shift:  [facility] Bowel Management  Level of Assistance: [place]  Frequency/Number of Accidents this Shift: [place]    [facility] Bed/Chair/Wheelchair Transfer:  Bed/chair/wheelchair Transfer Score = 4.  Patient performs 75% or more of effort and minimal assistance (little/incidental  help/lifting of one limb/steadying) for transferring to and from the  bed/chair/wheelchair, requiring: No assistive devices were required.  BERONICA Toilet Transfer:  Branch  [facility] Tub/Shower Transfer:  Branch    Previously Documented Mode of Locomotion at Discharge: Field  BERONICA Expected Mode of Locomotion at Discharge: [facility] Walk/Wheelchair:  WHEELCHAIR OBSERVATION   Wheelchair did not occur.    WALK OBSERVATION   Walk Distance Scale = 3.  Distance walked is greater than 150 feet. Walk Score  = 4.  Patient performs 75% or more of effort and requires minimal assistance.  Incidental help/contact guard/steadying was provided. Patient walked a distance  of  160 feet. Patient requires the following assistive device(s):  Rolling  walker.  BERONICA Stairs:  Stairs Score = 2.  Incidental assistance with lifting or lowering,  contact guard or steadying was provided. Patient performs 75% or more of effort  and requires minimal contact assistance. Patient negotiated  4 stairs. Patient  requires the following assistive device(s): Handrail(s).    BERONICA Comprehension:  [place]  BERONICA Expression:  [place]  BERONICA Social Interaction:  [facility] Problem Solving:  [facility] Memory:  [place]    Therapy Mode Minutes  Occupational Therapy: [place]  Physical Therapy: Individual: 60 minutes.  Speech Language Pathology:  [place]    Signed by: Enma Bernstein DPT

## 2018-04-12 NOTE — PROGRESS NOTES
Inpatient Rehabilitation Functional Measures Assessment    Functional Measures  BERONICA Eating:  Branch  [facility] Grooming: Branch  [facility] Bathing:  Branch  [facility] Upper Body Dressing:  [place]anch  [facility] Lower Body Dressing:  [place]  [facility] Toileting:  Toileting Score = 4.  Patient requires minimal assistance for  toileting, such as steadying for balance while cleansing or adjusting clothes.  Patient requires the following assistive device(s): Grab bar.    BERONICA Bladder Management  Level of Assistance:  Bladder Score = 7.  Patient is completely independent for  bladder management. There are no activity limitations.  Frequency/Number of Accidents this Shift:  Bladder accidents this shift:  2 .    BERONICA Bowel Management  Level of Assistance: Activity was not observed.  Frequency/Number of Accidents this Shift: Bowel accidents this shift: 0 .  Patient has not had an accident this shift.    BERONICA Bed/Chair/Wheelchair Transfer:  Bed/chair/wheelchair Transfer Score = 4.  Patient performs 75% or more of effort and minimal assistance (little/incidental  help/lifting of one limb/steadying) for transferring to and from the  bed/chair/wheelchair, requiring: No assistive devices were required.  BERONICA Toilet Transfer:  Toilet Transfer Score = 4.  Patient performs 75% or more  of effort and minimal assistance (little/incidental help/steadying) for  transferring to and from the toilet/commode, requiring: Steadying. Patient  requires the following assistive device(s): Grab bars.  [facility] Tub/Shower Transfer:  [place]    Previously Documented Mode of Locomotion at Discharge: Field  BERONICA Expected Mode of Locomotion at Discharge: [facility] Walk/Wheelchair:  [facility] Stairs:  [facility] Comprehension:  [facility] Expression:  [facility] Social Interaction:  [facility] Problem Solving:  [facility] Memory:  [place]    Therapy Mode Minutes  Occupational Therapy: [place]  Physical Therapy: [place]  Speech Language Pathology:  [place]    Signed by: RUTH Miranda

## 2018-04-13 LAB
GLUCOSE BLDC GLUCOMTR-MCNC: 129 MG/DL (ref 70–130)
GLUCOSE BLDC GLUCOMTR-MCNC: 146 MG/DL (ref 70–130)
GLUCOSE BLDC GLUCOMTR-MCNC: 90 MG/DL (ref 70–130)
GLUCOSE BLDC GLUCOMTR-MCNC: 96 MG/DL (ref 70–130)

## 2018-04-13 PROCEDURE — 97110 THERAPEUTIC EXERCISES: CPT

## 2018-04-13 PROCEDURE — 82962 GLUCOSE BLOOD TEST: CPT

## 2018-04-13 PROCEDURE — 25010000002 CEFTRIAXONE PER 250 MG: Performed by: PHYSICAL MEDICINE & REHABILITATION

## 2018-04-13 PROCEDURE — G0515 COGNITIVE SKILLS DEVELOPMENT: HCPCS

## 2018-04-13 PROCEDURE — 97112 NEUROMUSCULAR REEDUCATION: CPT

## 2018-04-13 PROCEDURE — 97535 SELF CARE MNGMENT TRAINING: CPT

## 2018-04-13 RX ORDER — METOPROLOL SUCCINATE 25 MG/1
25 TABLET, EXTENDED RELEASE ORAL
Status: DISCONTINUED | OUTPATIENT
Start: 2018-04-13 | End: 2018-04-16

## 2018-04-13 RX ADMIN — CLOPIDOGREL 75 MG: 75 TABLET, FILM COATED ORAL at 08:20

## 2018-04-13 RX ADMIN — LEVETIRACETAM 1000 MG: 500 TABLET, FILM COATED ORAL at 08:20

## 2018-04-13 RX ADMIN — DEXAMETHASONE 2 MG: 2 TABLET ORAL at 08:21

## 2018-04-13 RX ADMIN — ESCITALOPRAM 20 MG: 20 TABLET, FILM COATED ORAL at 08:20

## 2018-04-13 RX ADMIN — MONTELUKAST 10 MG: 10 TABLET, FILM COATED ORAL at 08:20

## 2018-04-13 RX ADMIN — LACOSAMIDE 100 MG: 100 TABLET, FILM COATED ORAL at 08:20

## 2018-04-13 RX ADMIN — METRONIDAZOLE 500 MG: 500 TABLET, FILM COATED ORAL at 12:42

## 2018-04-13 RX ADMIN — HYDRALAZINE HYDROCHLORIDE 25 MG: 25 TABLET ORAL at 21:25

## 2018-04-13 RX ADMIN — Medication 1 TABLET: at 08:20

## 2018-04-13 RX ADMIN — OXYCODONE HYDROCHLORIDE AND ACETAMINOPHEN 500 MG: 500 TABLET ORAL at 08:20

## 2018-04-13 RX ADMIN — PYRIDOXINE HCL TAB 50 MG 50 MG: 50 TAB at 08:20

## 2018-04-13 RX ADMIN — OXYCODONE HYDROCHLORIDE AND ACETAMINOPHEN 2 TABLET: 5; 325 TABLET ORAL at 10:37

## 2018-04-13 RX ADMIN — METRONIDAZOLE 500 MG: 500 TABLET, FILM COATED ORAL at 06:28

## 2018-04-13 RX ADMIN — FUROSEMIDE 20 MG: 20 TABLET ORAL at 08:20

## 2018-04-13 RX ADMIN — FOLIC ACID 1 MG: 1 TABLET ORAL at 08:20

## 2018-04-13 RX ADMIN — LACOSAMIDE 100 MG: 100 TABLET, FILM COATED ORAL at 21:25

## 2018-04-13 RX ADMIN — ASPIRIN 325 MG: 325 TABLET ORAL at 08:20

## 2018-04-13 RX ADMIN — HYDRALAZINE HYDROCHLORIDE 25 MG: 25 TABLET ORAL at 12:41

## 2018-04-13 RX ADMIN — VITAMIN D, TAB 1000IU (100/BT) 1000 UNITS: 25 TAB at 08:20

## 2018-04-13 RX ADMIN — PANTOPRAZOLE SODIUM 40 MG: 40 TABLET, DELAYED RELEASE ORAL at 06:28

## 2018-04-13 RX ADMIN — CETIRIZINE HYDROCHLORIDE 5 MG: 10 TABLET, FILM COATED ORAL at 08:20

## 2018-04-13 RX ADMIN — METRONIDAZOLE 500 MG: 500 TABLET, FILM COATED ORAL at 21:25

## 2018-04-13 RX ADMIN — LEVETIRACETAM 1000 MG: 500 TABLET, FILM COATED ORAL at 21:25

## 2018-04-13 RX ADMIN — SODIUM CHLORIDE 2 G: 900 INJECTION INTRAVENOUS at 23:12

## 2018-04-13 NOTE — PROGRESS NOTES
Inpatient Rehabilitation Functional Measures Assessment    Functional Measures  BERONICA Eating:  [facility] Grooming: [facility] Bathing:  [facility] Upper Body Dressing:  [facility] Lower Body Dressing:  [facility] Toileting:  [facility] Bladder Management  Level of Assistance:  [place]  Frequency/Number of Accidents this Shift:  [facility] Bowel Management  Level of Assistance: [place]  Frequency/Number of Accidents this Shift: [facility] Bed/Chair/Wheelchair Transfer:  [facility] Toilet Transfer:  [facility] Tub/Shower Transfer:  [place]    Previously Documented Mode of Locomotion at Discharge: Field  BERONICA Expected Mode of Locomotion at Discharge: [facility] Walk/Wheelchair:  [facility] Stairs:  [facility] Comprehension:  Auditory comprehension is the usual mode. Comprehension  Score = 6, Modified [place].  Patient comprehends complex/abstract  information in their primary language, requiring: Additional time.  BERONICA Expression:  Vocal expression is the usual mode. Patient does not express  complex/abstract information in their primary language without a helper.  Expression Score = 5, Stand By Prompting. Patient expresses basic daily needs or  ideas without prompting. No assistive devices were required.  BERONICA Social Interaction:  Social Interaction Score = 6, Modified Independent.  Patient is modified independent for social interaction, requiring: Requires  additional time.  BERONICA Problem Solving:  Patient does not make appropriate decisions in order to  solve complex problems without assistance from a helper. Problem Solving Score =  4, Minimal Direction. Patient makes appropriate decisions in order to solve  routine problems 75-90% of the time. Patient requires minimal/occasional  direction for the following behavior(s):  BERONICA Memory:  Memory Score = 5, Supervision.  Patient recognizes and remembers  with prompting only under stressful or unfamiliar conditions, but no more than  10% of the time, for  the following behavior(s):    Therapy Mode Minutes  Occupational Therapy: [place]  Physical Therapy: [place]  Speech Language Pathology:  [place]    Signed by: Blaire Rapp RN

## 2018-04-13 NOTE — THERAPY TREATMENT NOTE
Inpatient Rehabilitation - Speech Language Pathology Treatment Note    [facility]       Patient Name: Herlinda Ta  : 1940  MRN: [id number]    Today's Date: 2018           Admit Date: 2018     Informal Cognitive Evaluation:  One-step Directions    Two-step Directions    Auditory Processing    Immediate recall 6/10   Verbal Recall 1/3 spontaneous; 3/3 from recognition   Convergent thinking ([place])    Convergent thinking (Abstract)    Wrong category    Sequencing    Problem-solving for ADLs 10/10   Math word problems    Compare/contrast    Homographs    Word deduction 6/10         Visit Dx:      No diagnosis found.    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   • Cerebral vasospasm   • Cervical pain (neck)   • Depressed   • Depression   • Dyspepsia   • Acid reflux   • Groin hematoma   • HTN, goal below 140/90   • HLD (hyperlipidemia)   • Infection of artery   • Iron deficiency anemia   • MRSA (methicillin resistant staph aureus) culture positive   • Rheumatoid arthritis   • Urinary incontinence, mixed   • Subarachnoid hemorrhage          Therapy Treatment    Evaluation/Coping    Evaluation/Treatment Time and Intent  Document Type: therapy note (daily note) (18 1300 : Katherine L Bruton)  Mode of Treatment: speech-language pathology, individual therapy (18 1300 : Katherine L Bruton)  Patient/Family Observations: Pt in bed, assisted to wc for session in therpay office. Pt alert and cooperative. No complaints.  (18 1300 : Katherine L Bruton)  Start Time (Evaluation/Treatment): 1300 (18 1300 : Katherine L Bruton)  Stop Time (Evaluation/Treatment): 1330 (18 1300 : Katherine L Bruton)    Vitals/Pain/Safety    Pain Scale: Numbers  Pre/Post-Treatment  Pain Scale: Numbers, Pretreatment: 0/10 - no pain (04/13/18 1300 : Katherine L Bruton)    Cognition/Communication         Oral Motor/Eating         Mobility/Basic Activities/Instrumental Activities/Motor/Modality                   ROM/MMT                   Sensory/Myotome/Dermatome/Edema               Posture/Balance/Special Tests/Exercise/Transportation/Sexual Function                   Orthotics/Residual Limb/Prosthetic Management              Outcome Summary         EDUCATION    The patient has been educated in the following areas:     Cognitive Impairment.    SLP Recommendation and Plan                                                                SLP GOALS     Row Name 04/13/18 1300 04/13/18 1000 04/12/18 1600       Memory Skills Goal 1 (SLP)    Improve Memory Skills Through Goal 1 (SLP)  -- recalling unrelated word lists with an imposed delay  -KB recalling unrelated word lists with an imposed delay;visual memory task;90%;independently (over 90% accuracy);listen to a paragraph and answer questions;read a paragraph and answer questions;listen to multiple paragraphs and answer questions;read multiple paragraphs and answer questions;recall details of the day  -AW    Time Frame (Memory Skills Goal 1, SLP)  --  -- by discharge  -AW    Progress (Memory Skills Goal 1, SLP)  -- with moderate cues (50-74%)  -KB  --    Progress/Outcomes (Memory Skills Goal 1, SLP)  -- goal ongoing  -KB  --    Comment (Memory Skills Goal 1, SLP)  -- recalled 1/3 items after 3 minute delay; MOD cues for 3/3  -KB  --       Memory Skills Goal 2 (SLP)    Improve Memory Skills Through Goal 2 (SLP)  -- recall details from a word list;select a word from a list by exclusion;repeat list in sequential order;90%;independently (over 90% accuracy)   NEW GOAL  -KB  --    Time Frame (Memory Skills Goal 2, SLP)  -- by discharge  -KB  --       Memory Skills Goal (SLP)    Improve Memory Skills Through Goal (SLP)  -- immediate recall  of digit string, words, sentences  -KB  --    Progress (Memory Skills Goal, SLP)  -- 60%;independently (over 90% accuracy)  -KB  --    Progress/Outcomes (Memory Skills Goal, SLP)  -- goal ongoing  -KB  --       Organizational Skills Goal 1 (SLP)    Improve Thought Organization Through Goal 1 (SLP) completing a verbal sequencing task  -KB completing a divergent naming task;completing a convergent naming task  -KB completing a divergent naming task;completing a convergent naming task;completing a verbal sequencing task;completing mental manipulation task;90%;independently (over 90% accuracy)  -AW    Time Frame (Thought Organization Skills Goal 1, SLP)  --  -- by discharge  -AW    Progress (Thought Organization Skills Goal 1, SLP) 90%;independently (over 90% accuracy)  -KB 90%;independently (over 90% accuracy)  -KB  --    Progress/Outcomes (Thought Organization Skills Goal 1, SLP) goal met  -KB goal met  -KB  --    Comment (Thought Organization Skills Goal 1, SLP) ongoing goal: stating similarities/differences  -KB  --  --       Reasoning Goal 1 (SLP)    Improve Reasoning Through Goal 1 (SLP) complete deductive reasoning task   word deduction  -KB  -- complete high level reasoning task;complete deductive reasoning task;complete mental flexibility task;complete logic/creative thinking task;90%;independently (over 90% accuracy)  -AW    Time Frame (Reasoning Goal 1, SLP)  --  -- by discharge  -AW    Progress (Reasoning Goal 1, SLP) 60%;independently (over 90% accuracy);100%;with moderate cues (50-74%)  -KB  --  --    Progress/Outcomes (Reasoning Goal 1, SLP) goal ongoing  -KB  --  --       Functional Math Skills Goal 1 (SLP)    Improve Functional Math Skills Through Goal 1 (SLP) complete word problems involving time;complete word problems involving money  -KB  -- complete functional math task;complete word problems involving time;complete word problems involving money;complete checkbook task;90%;independently (over 90%  accuracy)  -AW    Time Frame (Functional Math Skills Goal 1, SLP)  --  -- by discharge  -AW    Progress (Functional Math Skills Goal 1, SLP) 100%;independently (over 90% accuracy)  -KB  --  --    Progress/Outcomes (Functional Math Skills Goal 1, SLP) goal met  -KB  --  --    Comment (Functional Math Skills Goal 1, SLP) ongoing goal: functional math task, checkbook task  -KB  --  --       Executive Functional Skills Goal 1 (SLP)    Improve Executive Function Skills Goal 1 (SLP) exhibit cognitive flexibility   stating multiple definitions  -KB  -- organization/planning activity;time management activity;complex organization/planning activity;home management activity;perform self-correction;exhibit cognitive flexibility;90%;independently (over 90% accuracy)  -AW    Time Frame (Executive Function Skills Goal 1, SLP)  --  -- by discharge  -AW    Progress (Executive Function Skills Goal 1, SLP) 50%;independently (over 90% accuracy);100%;with moderate cues (50-74%)  -KB  --  --    Progress/Outcomes (Executive Function Skills Goal 1, SLP) goal ongoing  -KB  --  --      User Key  (r) = Recorded By, (t) = Taken By, (c) = Cosigned By    Initials Name Provider Type    NALDO Mckinley MS CCC-SLP Speech and Language Pathologist    KB Katherine L Bruton Speech and Language Pathologist               SLP Outcome Measures (last 72 hours)      SLP Outcome Measures     Row Name 04/12/18 1600             SLP Outcome Measures    Outcome Measure Used? Adult NOMS  -         FCM Scores    Memory FCM Score 4  -AW        User Key  (r) = Recorded By, (t) = Taken By, (c) = Cosigned By    Initials Name Effective Dates    NALDO Mckinley MS CCC-SLP 04/13/15 -               Time Calculation:             Time Calculation- SLP     Row Name 04/13/18 1330 04/13/18 1030 04/13/18 0810       Time Calculation- SLP    SLP Start Time 1300  -KB 1000  -KB  --    SLP Stop Time 1330  -KB 1030  -KB  --    SLP Time Calculation (min) 30 min  -KB 30 min  -KB  --     SLP Non-Billable Time (min)  --  -- 15 min   rounds  -      User Key  (r) = Recorded By, (t) = Taken By, (c) = Cosigned By    Initials Name Provider Type    ANN Kamara MS CCC-SLP Speech and Language Pathologist    KB Katherine L Bruton Speech and Language Pathologist            Therapy Charges for Today     Code Description Service Date Service Provider Modifiers Qty    05704434513 HC ST DEV OF COGN SKILLS EACH 15 MIN 4/13/2018 Katherine L Bruton  4               ADULT NOMS (last 72 hours)      Adult NOMS     Row Name 04/12/18 1600                   FCM Scores    Memory FCM Score 4  -AW          User Key  (r) = Recorded By, (t) = Taken By, (c) = Cosigned By    Initials Name Effective Dates    AW Keren Mckilney MS CCC-SLP 04/13/15 -                    Katherine L Bruton  4/13/2018

## 2018-04-13 NOTE — PLAN OF CARE
Problem: Patient Care Overview  Goal: Plan of Care Review  Outcome: Ongoing (interventions implemented as appropriate)    Goal: Individualization and Mutuality  Outcome: Ongoing (interventions implemented as appropriate)    Goal: Discharge Needs Assessment  Outcome: Ongoing (interventions implemented as appropriate)    Goal: Home Safety Plan  Outcome: Ongoing (interventions implemented as appropriate)    Goal: Coping Plan  Outcome: Ongoing (interventions implemented as appropriate)    Goal: Community Reintegration Plan  Outcome: Ongoing (interventions implemented as appropriate)      Problem: Skin Injury Risk (Adult)  Goal: Skin Health and Integrity  Outcome: Ongoing (interventions implemented as appropriate)      Problem: Fall Risk (Adult)  Goal: Identify Related Risk Factors and Signs and Symptoms  Outcome: Outcome(s) achieved Date Met: 04/13/18    Goal: Absence of Fall  Outcome: Ongoing (interventions implemented as appropriate)      Problem: Mobility, Physical Impaired (Adult)  Goal: Identify Related Risk Factors and Signs and Symptoms  Outcome: Outcome(s) achieved Date Met: 04/13/18    Goal: Enhanced Mobility Skills  Outcome: Ongoing (interventions implemented as appropriate)    Goal: Enhanced Functional Ability  Outcome: Ongoing (interventions implemented as appropriate)

## 2018-04-13 NOTE — PROGRESS NOTES
Inpatient Rehabilitation Plan of Care Note    Plan of Care  Care Plan Reviewed - No updates at this time.    Safety    Performed Intervention(s)  Safety rounds; call light/items within easy reach  Bed Alarm      Body Function Structure    Performed Intervention(s)  Skin assessment every shift  Dressing changes/Wound Care as order  Turning      Sphincter Control    Performed Intervention(s)  Monitor I&O  Timed voids  Use incontinent products      Psychosocial    Performed Intervention(s)  Encourage to verbalize concerns    Signed by: Blaire Mccoy RN

## 2018-04-13 NOTE — PROGRESS NOTES
Inpatient Rehabilitation Functional Measures Assessment and Plan of Care    Plan of Care  Updated Problems/Interventions  Field    Functional Measures  BERONICA Eating:  [place]  BERONICA Grooming: [facility] Bathing:  [facility] Upper Body Dressing:  [facility] Lower Body Dressing:  [facility] Toileting:  [facility] Bladder Management  Level of Assistance:  [place]  Frequency/Number of Accidents this Shift:  [facility] Bowel Management  Level of Assistance: [place]  Frequency/Number of Accidents this Shift: [facility] Bed/Chair/Wheelchair Transfer:  Activity was not observed.  BERONICA Toilet Transfer:  [facility] Tub/Shower Transfer:  [place]    Previously Documented Mode of Locomotion at Discharge: Field  BERONICA Expected Mode of Locomotion at Discharge: [facility] Walk/Wheelchair:  WHEELCHAIR OBSERVATION   Activity was not observed.    WALK OBSERVATION   Walk Distance Scale = 3.  Distance walked is greater than 150 feet. Walk Score  = 4.  Patient performs 75% or more of effort and requires minimal assistance.  Incidental help/contact guard/steadying was provided. Patient walked a distance  of  160 feet. Patient requires the following assistive device(s): Rolling  walker.  BERONICA Stairs:  Stairs Score = 2.  Incidental assistance with lifting or lowering,  contact guard or steadying was provided. Patient performs 75% or more of effort  and requires minimal contact assistance. Patient negotiated  4 stairs. Patient  requires the following assistive device(s): Handrail(s).    BERONICA Comprehension:  [facility] Expression:  [facility] Social Interaction:  [facility] Problem Solving:  [facility] Memory:  [place]    Therapy Mode Minutes  Occupational Therapy: [place]  Physical Therapy: Individual: 60 minutes.  Speech Language Pathology:  [place]    Signed by: Kera Cabrera PTA

## 2018-04-13 NOTE — THERAPY TREATMENT NOTE
Inpatient Rehabilitation - Physical Therapy Treatment Note  [facility]     Patient Name: Herlinda Ta  : 1940  MRN: [id number]    Today's Date: 2018                 Admit Date: 2018      Visit Dx:    No diagnosis found.    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   • Cerebral vasospasm   • Cervical pain (neck)   • Depressed   • Depression   • Dyspepsia   • Acid reflux   • Groin hematoma   • HTN, goal below 140/90   • HLD (hyperlipidemia)   • Infection of artery   • Iron deficiency anemia   • MRSA (methicillin resistant staph aureus) culture positive   • Rheumatoid arthritis   • Urinary incontinence, mixed   • Subarachnoid hemorrhage       Therapy Treatment    Evaluation/Coping    Evaluation/Treatment Time and Intent  Document Type: therapy note (daily note) (18 : Kera Cabrera PTA)  Mode of Treatment: physical therapy (18 : Kera Cabrera PTA)  Patient/Family Observations: up in w/c. Agreed to PT (18 : Kera Cabrera PTA)    Vitals/Pain/Safety    Pain Scale: Numbers Pre/Post-Treatment  Pain Scale: Numbers, Pretreatment: 0/10 - no pain (18 : Kera Cabrera PTA)  Positioning and Restraints  Post Treatment Position: bed (18 : Kera Cabrera PTA)  In Bed: supine, call light within reach, exit alarm on, with family/caregiver (18 : Kera Cabrera PTA)    Cognition/Communication         Oral Motor/Eating         Mobility/Basic Activities/Instrumental Activities/Motor/Modality    Bed Mobility Assessment/Treatment  Sit-Supine [place] (Bed Mobility): contact guard, supervision (18 : Kera Cabrera PTA)  Sit-Stand Transfer  Sit-Stand [place] (Transfers): contact guard (18  0908 : Kera Cabrera PTA)  Assistive Device (Sit-Stand Transfers): walker, front-wheeled (04/13/18 0908 : Kera Cabrera PTA)  Stand-Sit Transfer  Stand-Sit [place] (Transfers): contact guard (04/13/18 0908 : Kera Cabrera PTA)  Assistive Device (Stand-Sit Transfers): walker, front-wheeled (04/13/18 0908 : Kera Cabrera PTA)  Gait/Stairs Assessment/Training  [place] Level (Gait): contact guard (04/13/18 0908 : Kera Cabrera PTA)  Assistive Device (Gait): walker, front-wheeled (04/13/18 0908 : Kera Cabrera PTA)  Distance in Feet (Gait): 160 (as far as 200 ft) (04/13/18 0908 : Kera Cabrera PTA)  Pattern (Gait): step-through (04/13/18 0908 : Kera Cabrera PTA)  Deviations/Abnormal Patterns (Gait): genesis decreased (04/13/18 0908 : Kera Cabrera PTA)  Right Sided Gait Deviations: heel strike decreased (04/13/18 0908 : Kera Cabrera PTA)  [place] Level (Stairs): minimum assist (75% patient effort), contact guard (04/13/18 0908 : Kera Cabrera PTA)  Handrail Location (Stairs): both sides (04/13/18 0908 : Kera Cabrera PTA)  Number of Steps (Stairs): 4 (04/13/18 0908 : Kera Cabrera PTA)  Ascending Technique (Stairs): step-to-step (04/13/18 0908 : Kera Cabrera PTA)  Descending Technique (Stairs): step-to-step (04/13/18 0908 : Kera Cabrera PTA)  Assistive Device (Ramp): other (see comments) (Rwx. Min/CGA, vc's) (04/13/18 0908 : Kera Cabrera PTA)  Comment (Gait/Stairs): curb, rwx, Min/CGA, vc's (04/13/18 0908 : Kera Cabrera PTA)              ROM/MMT                   Sensory/Myotome/Dermatome/Edema               Posture/Balance/Special Tests/Exercise/Transportation/Sexual Function    Dynamic Balance Activity  Therapeutic Training Performed (Dynamic Balance): ambulate backward, side stepping, other (see comments) (stepping over hurdles forward and sidestep) (04/13/18 0908 : Kera Cabrera PTA)  Support Needed for  Balance (Dynamic Balance Training): uses at least one upper extremity for support (04/13/18 0908 : Kera Cabrera PTA)  Comment (Dynamic Balance Training): also performed sidestep w front crossover at hemibars (04/13/18 0908 : Kera Cabrera PTA)    Lower Extremity Standing Therapeutic Exercise  Performed, Standing Lower Extremity (Therapeutic Exercise): mini-squats, heel raises (04/13/18 0908 : eKra Cabrera PTA)  Exercise Type, Standing Lower Extremity (Therapeutic Exercise): AROM (active range of motion) (04/13/18 0908 : Kera Cabrera PTA)  Sets/Reps Detail, Standing Lower Extremity (Therapeutic Exercise): 1/10 (04/13/18 0908 : Kera Cabrera PTA)         Orthotics/Residual Limb/Prosthetic Management              Outcome Summary                 PT Recommendation and Plan                         PT IRF GOALS     Row Name 04/12/18 0900             Bed Mobility Goal 1 (PT-IRF)    Activity/Assistive Device (Bed Mobility Goal 1, PT-IRF) bed mobility activities, all  -EE      [place] Level (Bed Mobility Goal 1, PT-IRF) independent  -EE      Time Frame (Bed Mobility Goal 1, PT-IRF) long term goal (LTG);2 weeks  -EE      Progress/Outcomes (Bed Mobility Goal 1, PT-IRF) goal ongoing  -EE         Transfer Goal 1 (PT-IRF)    Activity/Assistive Device (Transfer Goal 1, PT-IRF) sit-to-stand/stand-to-sit;bed-to-chair/chair-to-bed   with A.A.D.  -EE      [place] Level (Transfer Goal 1, PT-IRF) conditional independence  -EE      Time Frame (Transfer Goal 1, PT-IRF) long term goal (LTG);2 weeks  -EE      Progress/Outcomes (Transfer Goal 1, PT-IRF) goal ongoing  -EE         Transfer Goal 2 (PT-IRF)    Activity/Assistive Device (Transfer Goal 2, PT-IRF) car transfer  -EE      [place] Level (Transfer Goal 2, PT-IRF) supervision required  -EE      Time Frame (Transfer Goal 2, PT-IRF) long term goal (LTG);2 weeks  -EE      Progress/Outcomes (Transfer Goal 2, PT-IRF) goal ongoing  -EE          Gait/Walking Locomotion Goal 1 (PT-IRF)    Activity/Assistive Device (Gait/Walking Locomotion Goal 1, PT-IRF) gait (walking locomotion)   with A.A.D.  -EE      Gait/Walking Locomotion Distance Goal 1 (PT-IRF) 160  -EE      [place] Level (Gait/Walking Locomotion Goal 1, PT-IRF) supervision required  -EE      Time Frame (Gait/Walking Locomotion Goal 1, PT-IRF) long term goal (LTG);2 weeks  -EE      Progress/Outcomes (Gait/Walking Locomotion Goal 1, PT-IRF) goal ongoing  -EE         Stairs Goal 1 (PT-IRF)    Activity/Assistive Device (Stairs Goal 1, PT-IRF) ascending stairs;descending stairs;using handrail, left;using handrail, right  -EE      Number of Stairs (Stairs Goal 1, PT-IRF) 12  -EE      [place] Level (Stairs Goal 1, PT-IRF) supervision required  -EE      Time Frame (Stairs Goal 1, PT-IRF) long term goal (LTG);2 weeks  -EE      Progress/Outcomes (Stairs Goal 1, PT-IRF) goal ongoing  -EE        User Key  (r) = Recorded By, (t) = Taken By, (c) = Cosigned By    Initials Name Provider Type    EE Enma Bernstein PT Physical Therapist                   Time Calculation:           PT Charges     Row Name 04/13/18 1537 04/13/18 0908          Time Calculation    Start Time 1400  -LB 0900  -LB     Stop Time 1430  -LB 0930  -LB     Time Calculation (min) 30 min  -LB 30 min  -LB       User Key  (r) = Recorded By, (t) = Taken By, (c) = Cosigned By    Initials Name Provider Type    LB Kera Cabrera PTA Physical Therapy Assistant            Therapy Charges for Today     Code Description Service Date Service Provider Modifiers Qty    [id number] HC PT THER PROC EA 15 MIN 4/13/2018 Kera Cabrera PTA GP 4                   Kera Cabrera PTA  4/13/2018

## 2018-04-13 NOTE — NURSING NOTE
04/13/18 1005   Wound Right groin   No Date first assessed or Time first assessed found.   Present On Admission : yes  Side: Right  Location: groin   Dressing Appearance other (see comments)  (VAC drsg intact with good seal)   Base clean;moist;red/granulating;slough   Red (%), Wound Tissue Color 90   Yellow (%), Wound Tissue Color 10   Periwound intact;dry   Periwound Temperature warm   Edges open   Wound length (cm) 4 cm   Wound width (cm) 11 cm   Wound depth (cm) 2.5 cm   Tunneling [Depth (cm)/Location] 8cms at 3; 3 cms at 4; 3cms at 9; 4 cms at 10; 6.2 cms at 11   Drainage Characteristics/Odor serosanguineous   Drainage Amount moderate   Care, Wound cleansed with;sterile half normal saline   Dressing Care, Wound dressing changed   Periwound Care, Wound barrier film applied   NPWT (Negative Pressure Wound Therapy) R groin   No Placement Date or Time found.   Location: R groin   Therapy Setting continuous therapy   Dressing foam, black;foam, white;other (see comments)  (2 white, 1 black)   Pressure Setting 75 mmHg   Sponges Inserted other (see comments)  (2 white, 1 black)   Sponges Removed 2   Finger sweep complete Yes   pt tolerated VAC drsg change well with little discomfort.

## 2018-04-13 NOTE — PROGRESS NOTES
Occupational Therapy: Individual: 60 minutes.    Physical Therapy: Branch    Speech Language Pathology:  Branch    Signed by: Keren Lowe OT

## 2018-04-13 NOTE — PROGRESS NOTES
LOS: 2 days   Patient Care Team:  Stanislaw Esposito MD as PCP - General     Chief Complaint:   SAH, opthalmic artery aneurysm s/p endovascular embolization.   Right Groin hematoma, right groin wound infection s/p wound vac placement.   HTN        Subjective     History of Present Illness    Subjective  She denies significant headache.  Tolerates therapies.  Does have some soreness at the right groin where she has a wound, as expected.    History taken from: patient    Objective     Vital Signs  Temp:  [97.3 °F (36.3 °C)-98.2 °F (36.8 °C)] 98.2 °F (36.8 °C)  Heart Rate:  [63-86] 86  Resp:  [14-16] 16  BP: (119-155)/(67-79) 155/70    Objective  MENTAL STATUS-awake, alert  HEENT -  LUNGS -CTA  HEART -RRR  ABD - NABS, soft, NT  EXT - VAC RIGHT GROIN  NEURO - takes resistance bilateral upper extremities and lower extremities      Results Review:     I reviewed the patient's new clinical results.    Results from last 7 days  Lab Units 04/12/18  0905   WBC 10*3/mm3 5.23   HEMOGLOBIN g/dL 9.3*   HEMATOCRIT % 28.3*   PLATELETS 10*3/mm3 181         Results from last 7 days  Lab Units 04/12/18  0905   SODIUM mmol/L 135*   POTASSIUM mmol/L 3.6   CHLORIDE mmol/L 100   CO2 mmol/L 20.5*   BUN mg/dL 20   CREATININE mg/dL 1.04*   CALCIUM mg/dL 8.4*   BILIRUBIN mg/dL 0.8   ALK PHOS U/L 60   ALT (SGPT) U/L 29   AST (SGOT) U/L 20   GLUCOSE mg/dL 104*                 Medication Review: DONE  Scheduled Meds:  aspirin 325 mg Oral Daily   ceftriaxone 2 g Intravenous Daily   cetirizine 5 mg Oral Daily   cholecalciferol 1,000 Units Oral Daily   clopidogrel 75 mg Oral Daily   dexamethasone 2 mg Oral Daily With Breakfast   Followed by      [START ON 4/14/2018] dexamethasone 1 mg Oral Daily With Breakfast   escitalopram 20 mg Oral Daily   folic acid 1 mg Oral Daily   furosemide 20 mg Oral Daily   hydrALAZINE 25 mg Oral Q8H   lacosamide 100 mg Oral Q12H   levETIRAcetam 1,000 mg Oral BID   metroNIDAZOLE 500 mg Oral Q8H   montelukast 10  mg Oral Daily   multivitamin 1 tablet Oral Daily   pantoprazole 40 mg Oral Q AM   vitamin B-6 50 mg Oral Daily   vitamin C 500 mg Oral Daily     Continuous Infusions:   PRN Meds:.•  albuterol  •  EPINEPHrine  •  hydrALAZINE  •  oxyCODONE-acetaminophen **OR** oxyCODONE-acetaminophen  •  triamcinolone      Assessment/Plan     Active Problems:    Subarachnoid hemorrhage      Assessment & Plan   SAH, opthalmic artery aneurysm s/p endovascular embolization.   Aspirin / Plavix  Decadron taper  GI prophylaxis - Protonix  Seizure - Vimpat / Keppra  HTN - SBP goal 120-180, s/p vasospams - Patient's SBP goal is 120-180 per Neurosurgery.  April 12 - SBP was averaging 140's and ranged 120's to 180's recently at [facility].  She was on Hydralazine 25 mg q  8 hours scheduled and Hydralazine 10 mg IV q 4 hours prn SBP >180 or DBP > 100 with last dose 4-11-18 at 07:35 there.  At discharge she was restarted on Lasix 20 mg daily, Metoprolol 100 mg daily and Doxazosin 2 mg HS , which she was not on there.  SBP was 168 last PM and 118 this AM.  Will continue Hydralazine scheduled and po prn and Lasix, but hold additional Metoprolol and Doxazosin for now to avoid excessive BP reduction s/p aneurysm stent and s/p vasospasm. She completed Nimotop April 11.   April 13 -  - resume Metoprolol succinate at lower dose of 25 mg daily      Right Groin hematoma, right groin wound infection s/p wound vac placement.   ID - Rocephin / Metronidazole x 4 weeks - Clarified with [facility] pharmacy - Ceftriazone started 4-7 and Metronidazole started 4-11 - will do stop date May 9.    DVT - SCDs         TEAM CONF - April 13 - BALANCE AND PROPRIOCEPTIVE DEFICITS, SOMETIMES RUN INTO OBJECTS TO RIGHT, CORRECT WITH CUING. TRANSFERS CTG. GAIT 160 FEET RW CTG/MIN ASSIST. WOUND VAC RIGHT GROIN. BATH MIN. LBD MOD, UBD MIN ASSIST. COGNITION - SLOW PROCESSING AND MILD DIFFICULTY WITH EXECUTIVE FUNCTION. CONTINENT BOWEL AND BLADDER.  HEATHEROS- ONE WEEK    Misha  Tnoy Prince MD  04/13/18  7:36 AM    Time:

## 2018-04-13 NOTE — PLAN OF CARE
Problem: Patient Care Overview  Goal: Plan of Care Review  Outcome: Ongoing (interventions implemented as appropriate)   04/13/18 0127   Patient Care Overview   IRF Plan of Care Review progress ongoing, continue   Progress, Functional Goals demonstrating adequate progress   Coping/Psychosocial   Plan of Care Reviewed With patient   OTHER   Outcome Summary Patient calm, cooperative, and pleasant this evening. Her SBP was 119 and he  was very concerned. I directed the patient to drink a few cups of water and after she did her SBP was 142. No c/o pain, no unsafe behaviors.        Problem: Skin Injury Risk (Adult)  Goal: Skin Health and Integrity  Outcome: Ongoing (interventions implemented as appropriate)   04/13/18 0127   Skin Injury Risk (Adult)   Skin Health and Integrity making progress toward outcome       Problem: Fall Risk (Adult)  Goal: Absence of Fall  Outcome: Ongoing (interventions implemented as appropriate)   04/13/18 0127   Fall Risk (Adult)   Absence of Fall making progress toward outcome       Problem: Mobility, Physical Impaired (Adult)  Goal: Identify Related Risk Factors and Signs and Symptoms  Outcome: Ongoing (interventions implemented as appropriate)   04/13/18 0127   Mobility, Physical Impaired (Adult)   Related Risk Factors (Physical Mobility, Impaired) cognitive impairment;functional decline   Signs and Symptoms (Physical Mobility Impaired) decreased balance     Goal: Enhanced Mobility Skills  Outcome: Ongoing (interventions implemented as appropriate)   04/13/18 0127   Mobility, Physical Impaired (Adult)   Enhanced Mobility Skills making progress toward outcome     Goal: Enhanced Functional Ability  Outcome: Ongoing (interventions implemented as appropriate)   04/13/18 0127   Mobility, Physical Impaired (Adult)   Enhanced Functional Ability making progress toward outcome

## 2018-04-13 NOTE — PROGRESS NOTES
Inpatient Rehabilitation Functional Measures Assessment    Functional Measures  BERONICA Eating:  [facility] Grooming: [facility] Bathing:  [facility] Upper Body Dressing:  [facility] Lower Body Dressing:  [facility] Toileting:  [facility] Bladder Management  Level of Assistance:  [place]  Frequency/Number of Accidents this Shift:  [facility] Bowel Management  Level of Assistance: [place]  Frequency/Number of Accidents this Shift: [facility] Bed/Chair/Wheelchair Transfer:  [facility] Toilet Transfer:  [facility] Tub/Shower Transfer:  [place]    Previously Documented Mode of Locomotion at Discharge: Field  BERONICA Expected Mode of Locomotion at Discharge: [facility] Walk/Wheelchair:  [facility] Stairs:  [facility] Comprehension:  Auditory comprehension is the usual mode. Comprehension  Score = 6, Modified Max.  Patient comprehends complex/abstract  information in their primary language with only mild difficulty.  BERONICA Expression:  Vocal expression is the usual mode. Expression Score = 6,  Modified Independent.  Patient expresses complex/abstract information in their  primary language with only mild difficulty with tasks.  BERONICA Social Interaction:  Social Interaction Score = 7, Independent. Patient is  completely independent for social interaction.  There are no activity  limitations.  BERONICA Problem Solving:  Problem Solving Score = 6, Modified Max.  Patient  makes appropriate decisions in order to solve complex problems with mild  difficulty but self-corrects.  BERONICA Memory:  Memory Score = 6, Modified Max.  Patient is modified  independent for memory, having only mild difficulty and using self-initiated or  environmental cues to remember.    Therapy Mode Minutes  Occupational Therapy: Branch  Physical Therapy: [place]  Speech Language Pathology:  [place]    Signed by: Blaire Mccoy RN

## 2018-04-13 NOTE — THERAPY TREATMENT NOTE
Inpatient Rehabilitation - Occupational Therapy Treatment Note    [facility]     Patient Name: Herlinda Ta  : 1940  MRN: [id number]    Today's Date: 2018                 Admit Date: 2018      Visit Dx:  No diagnosis found.    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   • Cerebral vasospasm   • Cervical pain (neck)   • Depressed   • Depression   • Dyspepsia   • Acid reflux   • Groin hematoma   • HTN, goal below 140/90   • HLD (hyperlipidemia)   • Infection of artery   • Iron deficiency anemia   • MRSA (methicillin resistant staph aureus) culture positive   • Rheumatoid arthritis   • Urinary incontinence, mixed   • Subarachnoid hemorrhage         Therapy Treatment          IRF Treatment Summary     Row Name 18 1538 18 1300 18 1000       Evaluation/Treatment Time and Intent    Document Type therapy note (daily note)  -AF therapy note (daily note)  -KB therapy note (daily note)  -KB    Mode of Treatment occupational therapy  -AF speech-language pathology;individual therapy  -KB speech-language pathology;individual therapy  -KB    Patient/Family Observations  -- Pt in bed, assisted to wc for session in therpay office. Pt alert and cooperative. No complaints.   -KB Pt in bed, assisted to wc. Seen in office. C/o pain in groin. Nursing notified after session.   -KB    Start Time (Evaluation/Treatment)  -- 1300  -KB 1000  -KB    Stop Time (Evaluation/Treatment)  -- 1330  -KB 1030  -KB    Recorded by [AF] ANNIE Espinoza [KB] Katherine L Bruton [KB] Katherine L Bruton    Row Name 18 0908             Evaluation/Treatment Time and Intent    Document Type therapy note (daily note)  -LB      Mode of Treatment physical therapy  -LB      Patient/Family Observations up  in w/c. Agreed to PT  -LB      Recorded by [LB] Kera Cabrera, [facility]      Row Name 04/13/18 1538             Cognition/Psychosocial- PT/OT    Orientation Status (Cognition) oriented x 3  -AF      Follows Commands (Cognition) WNL  -AF      Safety Deficit (Cognitive) mild deficit  -AF      Recorded by [AF] Keren Lowe, OTR      Row Name 04/13/18 1538 04/13/18 0908          Transfer Assessment/Treatment    Bed-Chair [place] (Transfers) minimum assist (75% patient effort);verbal cues  -AF  --     Assistive Device (Bed-Chair Transfers) wheelchair  -AF  --     Sit-Stand [place] (Transfers) contact guard;verbal cues  -AF contact guard  -LB     Stand-Sit [place] (Transfers) contact guard;verbal cues  -AF contact guard  -LB     Recorded by [AF] Keren Lowe OTR [LB] Kera Cabrera [facility]     Row Name 04/13/18 0908             Sit-Stand Transfer    Assistive Device (Sit-Stand Transfers) walker, front-wheeled  -LB      Recorded by [LB] Kera Cabrera [facility]      Row Name 04/13/18 0908             Stand-Sit Transfer    Assistive Device (Stand-Sit Transfers) walker, front-wheeled  -LB      Recorded by [LB] Kera Cabrera [facility]      Row Name 04/13/18 0908             Gait/Stairs Assessment/Training    [place] Level (Gait) contact guard  -LB      Assistive Device (Gait) walker, front-wheeled  -LB      Distance in Feet (Gait) 160   as far as 200 ft  -LB      Assistive Device (Ramp) other (see comments)   Rwx. Min/CGA, vc's  -LB      Comment (Gait/Stairs) curb, rwx, Min/CGA, vc's  -LB      Recorded by [LB] Kera Cabrera [facility]      Row Name 04/13/18 1538             Bathing Assessment/Treatment    Bathing [place] Level bathing skills;minimum assist (75% patient effort);contact guard assist;verbal cues  -AF      Recorded by [AF] Keren Lowe, OTR      Row Name 04/13/18 1538             Upper Body Dressing Assessment/Treatment    Upper Body Dressing Task upper body dressing skills;verbal  cues;standby assist  -AF      Recorded by [AF] ANNIE Espinoza      Row Name 04/13/18 1538             Lower Body Dressing Assessment/Treatment    Lower Body Dressing [place] Level doff;don;pants/bottoms;shoes/slippers;socks;underwear;contact guard assist;verbal cues  -AF      Lower Body Dressing Position supported standing;unsupported sitting  -AF      Recorded by [AF] ANNIE Espinoza      Row Name 04/13/18 1538             Grooming Assessment/Treatment    Grooming [place] Level grooming skills;verbal cues;supervision  -AF      Grooming Position sink side;supported sitting  -AF      Recorded by [AF] ANNIE Espinoza      Row Name 04/13/18 1538             Fine Motor Testing & Training    Comment, Fine Motor Coordination FMC tasks manpulating small parquarty pieces and foam puzzles   -AF      Recorded by [AF] ANNIE Espinoza      Row Name 04/13/18 1538             Vision Assessment/Intervention    Vision Assessment Comment MOD vc's and visual demos with completing mod difficult parquarty design   -AF      Recorded by [AF] ANNIE Espinoza      Row Name 04/13/18 1538 04/13/18 1300 04/13/18 1000       Pain Scale: Numbers Pre/Post-Treatment    Pain Scale: Numbers, Pretreatment 0/10 - no pain  -AF 0/10 - no pain  -KB 8/10   groin  -KB    Recorded by [AF] ANNIE Espinoza [KB] Katherine L Bruton [KB] Katherine L Bruton    Row Name 04/13/18 0908             Pain Scale: Numbers Pre/Post-Treatment    Pain Scale: Numbers, Pretreatment 0/10 - no pain  -LB      Recorded by [LB] Kera Cabrera, [facility]      Row Name 04/13/18 1538             Static Standing Balance    Level of [place] (Supported Standing, Static Balance) contact guard assist  -AF      Time Able to Maintain Position (Supported Standing, Static Balance) more than 5 minutes   standing at counter top to increased endurance  -AF      Recorded by [AF] ANNIE Espinoza      Row Name 04/13/18 1538              Therapeutic Exercise    Therapeutic Exercise seated, upper extremities  -AF      Recorded by [AF] ANNIE Espinoza      Row Name 04/13/18 1538             Upper Extremity Seated Therapeutic Exercise    Performed, Seated Upper Extremity (Therapeutic Exercise) shoulder flexion/extension;scapular protraction/retraction;elbow flexion/extension;forearm supination/pronation  -AF      Device, Seated Upper Extremity (Therapeutic Exercise) --   hand weight #2  -AF      Exercise Type, Seated Upper Extremity (Therapeutic Exercise) AROM (active range of motion);resistive exercise  -AF      Expected Outcomes, Seated Upper Extremity (Therapeutic Exercise) improve functional tolerance, self-care activity  -AF      Sets/Reps Detail, Seated Upper Extremity (Therapeutic Exercise) 10 reps x 2 sets  -AF      Transfers Skills, Training to Functional Activity, Seated Upper Extremity (Therapeutic Exercise) transfers skills to functional activity most of the time  -AF      Comment, Seated Upper Extremity (Therapeutic Exercise) hand gripper 20 reps   -AF      Recorded by [AF] Keren Lowe OTR      Row Name 04/13/18 1400             Weekly Summary of Progress (SLP)    Weekly Outcome Summary: Speech Language Pathology Cognitive evaluation completed this date and prior date. Please see note this date for details of informal cognitive evaluation. Pt with high-level cognitive deficits compared to her baseline prior to stroke. Paraphasias noted inconsistently in conversation, and pt reports some concern with language skills.   -KB      Plan for Continued Service After Discharge (SLP Eval) Continue ST, continue diagnostic treatment for language.   -KB      Recorded by [KB] Katherine L Bruton        User Key  (r) = Recorded By, (t) = Taken By, (c) = Cosigned By    Initials Name Effective Dates    LB Kera Cabrera, PTA 03/07/18 -     AF Keren Loew, OTR 04/03/18 -     KB Katherine L Bruton 03/07/18 -           Wound Right groin  (Active)   Dressing Appearance other (see comments) 4/13/2018 10:05 AM   Closure TULIO 4/13/2018  9:23 AM   Base clean;moist;red/granulating;slough 4/13/2018 10:05 AM   Red (%), Wound Tissue Color 90 4/13/2018 10:05 AM   Yellow (%), Wound Tissue Color 10 4/13/2018 10:05 AM   Periwound intact;dry 4/13/2018 10:05 AM   Periwound Temperature warm 4/13/2018 10:05 AM   Edges open 4/13/2018 10:05 AM   Wound length (cm) 4 cm 4/13/2018 10:05 AM   Wound width (cm) 11 cm 4/13/2018 10:05 AM   Wound depth (cm) 2.5 cm 4/13/2018 10:05 AM   Tunneling [Depth (cm)/Location] 8cms at 3; 3 cms at 4; 3cms at 9; 4 cms at 10; 6.2 cms at 11 4/13/2018 10:05 AM   Drainage Characteristics/Odor serosanguineous 4/13/2018 10:05 AM   Drainage Amount moderate 4/13/2018 10:05 AM   Care, Wound cleansed with;sterile half normal saline 4/13/2018 10:05 AM   Dressing Care, Wound other (see comments) 4/13/2018  2:36 PM   Periwound Care, Wound barrier film applied 4/13/2018 10:05 AM   Wound Output (mL) 450 4/13/2018  2:36 PM       NPWT (Negative Pressure Wound Therapy) R groin (Active)   Therapy Setting continuous therapy 4/13/2018 10:05 AM   Dressing foam, black;foam, white;other (see comments) 4/13/2018 10:05 AM   Pressure Setting 75 mmHg 4/13/2018 10:05 AM   Sponges Inserted other (see comments) 4/13/2018 10:05 AM   Sponges Removed 2 4/13/2018 10:05 AM   Finger sweep complete Yes 4/13/2018 10:05 AM         OT Recommendation and Plan                       OT IRF GOALS     Row Name 04/12/18 1524             Transfer Goal 1 (OT-IRF)    Activity/Assistive Device (Transfer Goal 1, OT-IRF) toilet;shower chair;walk-in shower;tub  -SO      [place] Level (Transfer Goal 1, OT-IRF) supervision required;verbal cues required  -SO      Time Frame (Transfer Goal 1, OT-IRF) long term goal (LTG);2 weeks  -SO         LB Dressing Goal 1 (OT-IRF)    Activity/Device (LB Dressing Goal 1, OT-IRF) lower body dressing  -SO      [place] (LB Dressing Goal 1, OT-IRF)  supervision required  -SO      Time Frame (LB Dressing Goal 1, OT-IRF) long term goal (LTG);2 weeks  -SO         Toileting Goal 1 (OT-IRF)    Activity/Device (Toileting Goal 1, OT-IRF) toileting skills, all;grab bar/safety frame  -SO      [place] Level (Toileting Goal 1, OT-IRF) supervision required  -SO      Time Frame (Toileting Goal 1, OT-IRF) long term goal (LTG);2 weeks  -SO         Strength Goal 1 (OT-IRF)    Strength Goal 1 (OT-IRF) Pt to increase overall BUE strength to 4-/5 to assist with functional activities and ADLs.  -SO      Time Frame (Strength Goal 1, OT-IRF) long term goal (LTG);2 weeks  -SO         Caregiver Training Goal 1 (OT-IRF)    Caregiver Training Goal 1 (OT-IRF) Pt and caregiver to be independent with AE, HEP, home safety, etc. at d/c.  -SO      Time Frame (Caregiver Training Goal 1, OT-IRF) long term goal (LTG);2 weeks  -SO        User Key  (r) = Recorded By, (t) = Taken By, (c) = Cosigned By    Initials Name Provider Type    SO Margo ANNIE Rodriguez Occupational Therapist                 Time Calculation:           Time Calculation- OT     Row Name 04/13/18 1545 04/13/18 1544          Time Calculation- OT    OT Start Time 1330  -AF 0830  -AF     OT Stop Time 1400  -AF 0900  -AF     OT Time Calculation (min) 30 min  -AF 30 min  -AF       User Key  (r) = Recorded By, (t) = Taken By, (c) = Cosigned By    Initials Name Provider Type    AF ANNIE Espinoza Occupational Therapist             Therapy Charges for Today     Code Description Service Date Service Provider Modifiers Qty    11878114522 HC OT SELF CARE/MGMT/TRAIN EA 15 MIN 4/13/2018 ANNIE Espinoza GO 2    03549323735 HC OT THER PROC EA 15 MIN 4/13/2018 ANNIE Espinoza GO 1    13143164502 HC OT NEUROMUSC RE EDUCATION EA 15 MIN 4/13/2018 ANNIE Espinoza GO 1                   ANNIE Espinoza  4/13/2018

## 2018-04-13 NOTE — PROGRESS NOTES
Case Management  Inpatient Rehabilitation Team Conference    Conference Date/Time: 4/13/2018 7:32:45 AM    Team Conference Attendees:  Sheila Rose MSSW Emily Erwin, PT  Kera Cabrera, PTA  Margo Jacob, OT  Carla Kamara, SLP  Blaire Mccoy, JUDY    Demographics            Age: 77Y            Gender: Female    Admission Date: 4/11/2018 8:44:42 PM  Rehabilitation Diagnosis:  SAH  Past Medical History: MRSA right ear, cataract sx; HTN, hyperlipid; pneumonitis,  asthma, wheezing; RA, neck pain, osteoporosis, right TKR; CKD, hyst;  constipation, gastritis, GERD, malabsorption, c-scope, EGD; cerebral aneurysm,  cervical neuropathy; hyperglycemia; leukopenia, lymphocytosis, vit d defic;  anxiety, depression      Plan of Care  Anticipated Discharge Date/Estimated Length of Stay: ELOS: 2 weeks  Anticipated Discharge Destination: Community discharge with assistance  Discharge Plan : Patient lives with  in tri-level home. 3 steps to enter  front. Bedroom and bathroom are upstairs. 6 steps up to that level.  D/C plan is home with  and home health services.  Medical Necessity Expected Level Rationale: SBA  Intensity and Duration: an average of 3 hours/5 days per week  Medical Supervision and 24 Hour Rehab Nursing: x  Physical Therapy: x  PT Intensity/Duration: 60 minutes/day, 5 days/week  Occupational Therapy: x  OT Intensity/Duration: 60 minutes/day, 5 days/week  Speech and Language Therapy: x  SLP Intensity/Duration: 60 minutes/day, 5 days/week  Social Work: x  Therapeutic Recreation: x  Psychology: x  Updated (if changes indicated)    Anticipated Discharge Date/Estimated Length of Stay:   ELOS: plan D/C 4/20    Based on the patient's medical and functional status, their prognosis and  expected level of functional improvement is: SBA      Interdisciplinary Problem/Goals/Status    All Rehab Problems:  Body Function Structure    [RN] Skin Integrity(Active)  Current  Status(04/11/2018): Right groin wound VAC; bruising all over  Weekly Goal(04/18/2018): No further skin breakdown  Discharge Goal: same as weekly        Cognition    [ST] Executive Functions(Active)  Current Status(04/12/2018): mild deficits  Weekly Goal(04/20/2018): follow written schedule I'ly  Discharge Goal: Functional cognition for home with intermittent supervision.        Mobility    [OT] Toilet Transfers(Active)  Current Status(04/12/2018): CGA/MIN  Weekly Goal(04/20/2018): CGA  Discharge Goal: SBA    [OT] Tub/Shower Transfers(Active)  Current Status(04/12/2018): TBD  Weekly Goal(04/20/2018): TBD  Discharge Goal: TBD    [PT] Bed/Chair/Wheelchair(Active)  Current Status(04/12/2018): CGA w/wx  Weekly Goal(04/20/2018): SBA  Discharge Goal: supervision    [PT] Walk(Active)  Current Status(04/12/2018): CGA/Min 160' rwx  Weekly Goal(04/20/2018): CGA to BR rwx  Discharge Goal: SBA/supervision 160' rwx    [PT] Bed Mobility(Active)  Current Status(04/12/2018): MIN  Weekly Goal(04/20/2018): SBA  Discharge Goal: independent    [PT] Stairs(Active)  Current Status(04/12/2018): min 4 with HRs  Weekly Goal(04/20/2018): PT only  Discharge Goal: supervision 12 w/HRs        Psychosocial    [RN] Coping/Adjustment(Active)  Current Status(04/11/2018): Patient has a supportive family  and son;  appears to be coping well with current status  Weekly Goal(04/18/2018): Patient will cope adequately regarding current status  and demonstrate healthy coping strategies  Discharge Goal: Same as weekly        Safety    [RN] Potential for Injury(Active)  Current Status(04/11/2018): Subarachnoid hemorrhage; Left leg weaker than right  Weekly Goal(04/18/2018): Will use call light for assistance; no falls  Discharge Goal: No falls        Self Care    [OT] Bathing(Active)  Current Status(04/12/2018): MIN  Weekly Goal(04/20/2018): CGA  Discharge Goal: SBA    [OT] Dressing (Lower)(Active)  Current Status(04/12/2018): MOD  Weekly  Goal(04/20/2018): MIN  Discharge Goal: CGA/SBA    [OT] Dressing (Upper)(Active)  Current Status(04/12/2018): MIN  Weekly Goal(04/20/2018): SETUP  Discharge Goal: SBA    [OT] Grooming(Active)  Current Status(04/12/2018): SETUP  Weekly Goal(04/20/2018): SBA  Discharge Goal: SUP    [OT] Toileting(Active)  Current Status(04/12/2018): MOD  Weekly Goal(04/20/2018): CGA  Discharge Goal: SBA        Sphincter Control    [RN] Bladder Management(Active)  Current Status(04/11/2018): Incontinent of bladder 100% per  report  Weekly Goal(04/18/2018): Continent 50%  Discharge Goal: Continent 100%    [RN] Bowel Management(Active)  Current Status(04/11/2018): Continent 100%  Weekly Goal(04/18/2018): Continent 100%  Discharge Goal: Continent 100%        Comments:    Signed by: Shelley Umaña,    Physician CoSigned By: Misha Prince 04/13/2018 08:06:55

## 2018-04-13 NOTE — PROGRESS NOTES
Inpatient Rehabilitation Functional Measures Assessment    Functional Measures  BERONICA Eating:  Branch  [facility] Grooming: Branch  [facility] Bathing:  Branch  [facility] Upper Body Dressing:  Branch  [facility] Lower Body Dressing:  Branch  [facility] Toileting:  Patient requires minimal assistance for adjusting clothing  before using a toilet, commode, bedpan, or urinal. Patient requires minimal  assistance for hygiene. Patient requires moderate assistance for adjusting  clothing after using a toilet, commode, bedpan, or urinal. Patient performs 0 -  24% of toileting tasks.  Toileting Score = 1, Total Assistance. Patient requires  the following assistive device(s): Grab bar.    [facility] Bladder Management  Level of Assistance:  Bladder Score = 1. Patient performs less than 25% of tasks  and requires total assistance for bladder management. [place] provides total  assist to completely apply and remove brief.  Frequency/Number of Accidents this Shift:  Bladder accidents this shift:  1 .    [facility] Bowel Management  Level of Assistance: Activity was not observed.  Frequency/Number of Accidents this Shift: Branch    [facility] Bed/Chair/Wheelchair Transfer:  Activity was not observed.  BERONICA Toilet Transfer:  Toilet Transfer Score = 5.  Patient is supervision/set-up  for transferring to and from the toilet/commode, requiring: Stand by assistance.  Verbal cuing, prompting, or instructing. Coaxing. Patient requires the following  assistive device(s): Walker.  BERONICA Tub/Shower Transfer:  [place]    Previously Documented Mode of Locomotion at Discharge: Field  BERONICA Expected Mode of Locomotion at Discharge: [facility] Walk/Wheelchair:  Branch  [facility] Stairs:  Branch    [facility] Comprehension:  Branch  [facility] Expression:  [facility] Social Interaction:  [facility] Problem Solving:  [facility] Memory:  [place]    Therapy Mode Minutes  Occupational Therapy: Branch  Physical Therapy: Branch  Speech Language Pathology:  [place]    Signed by: RUTH Estevez

## 2018-04-13 NOTE — PROGRESS NOTES
Discussed current status, weekly and d/c goals, and d/c date set for Friday, 4/20 with patient and . They are agreeable to plan. Scheduled family conference for Wednesday, 4/18 at 1:00 p.m.   Addendum: Contacted Lor with [facility] regarding patient's d/c date for 4/20 and need for home wound VAC. Discussed orders to be signed by patient's vascular doctor from [facility]. Lor will assist in facilitating this order.

## 2018-04-14 LAB
GLUCOSE BLDC GLUCOMTR-MCNC: 103 MG/DL (ref 70–130)
GLUCOSE BLDC GLUCOMTR-MCNC: 132 MG/DL (ref 70–130)
GLUCOSE BLDC GLUCOMTR-MCNC: 88 MG/DL (ref 70–130)
GLUCOSE BLDC GLUCOMTR-MCNC: 94 MG/DL (ref 70–130)

## 2018-04-14 PROCEDURE — 82962 GLUCOSE BLOOD TEST: CPT

## 2018-04-14 PROCEDURE — 97535 SELF CARE MNGMENT TRAINING: CPT

## 2018-04-14 PROCEDURE — G0515 COGNITIVE SKILLS DEVELOPMENT: HCPCS | Performed by: SPEECH-LANGUAGE PATHOLOGIST

## 2018-04-14 PROCEDURE — 97110 THERAPEUTIC EXERCISES: CPT | Performed by: PHYSICAL THERAPIST

## 2018-04-14 PROCEDURE — 25010000002 CEFTRIAXONE PER 250 MG: Performed by: PHYSICAL MEDICINE & REHABILITATION

## 2018-04-14 RX ADMIN — METRONIDAZOLE 500 MG: 500 TABLET, FILM COATED ORAL at 21:59

## 2018-04-14 RX ADMIN — VITAMIN D, TAB 1000IU (100/BT) 1000 UNITS: 25 TAB at 11:10

## 2018-04-14 RX ADMIN — ASPIRIN 325 MG: 325 TABLET ORAL at 11:10

## 2018-04-14 RX ADMIN — PANTOPRAZOLE SODIUM 40 MG: 40 TABLET, DELAYED RELEASE ORAL at 05:03

## 2018-04-14 RX ADMIN — METOPROLOL SUCCINATE 25 MG: 25 TABLET, FILM COATED, EXTENDED RELEASE ORAL at 11:16

## 2018-04-14 RX ADMIN — METRONIDAZOLE 500 MG: 500 TABLET, FILM COATED ORAL at 16:14

## 2018-04-14 RX ADMIN — DEXAMETHASONE 1 MG: 0.5 TABLET ORAL at 11:10

## 2018-04-14 RX ADMIN — OXYCODONE HYDROCHLORIDE AND ACETAMINOPHEN 500 MG: 500 TABLET ORAL at 11:11

## 2018-04-14 RX ADMIN — HYDRALAZINE HYDROCHLORIDE 25 MG: 25 TABLET ORAL at 05:04

## 2018-04-14 RX ADMIN — CLOPIDOGREL 75 MG: 75 TABLET, FILM COATED ORAL at 11:10

## 2018-04-14 RX ADMIN — Medication 1 TABLET: at 11:11

## 2018-04-14 RX ADMIN — LEVETIRACETAM 1000 MG: 500 TABLET, FILM COATED ORAL at 22:00

## 2018-04-14 RX ADMIN — LACOSAMIDE 100 MG: 100 TABLET, FILM COATED ORAL at 21:59

## 2018-04-14 RX ADMIN — LEVETIRACETAM 1000 MG: 500 TABLET, FILM COATED ORAL at 11:10

## 2018-04-14 RX ADMIN — HYDRALAZINE HYDROCHLORIDE 25 MG: 25 TABLET ORAL at 16:13

## 2018-04-14 RX ADMIN — MONTELUKAST 10 MG: 10 TABLET, FILM COATED ORAL at 11:11

## 2018-04-14 RX ADMIN — SODIUM CHLORIDE 2 G: 900 INJECTION INTRAVENOUS at 22:56

## 2018-04-14 RX ADMIN — CETIRIZINE HYDROCHLORIDE 5 MG: 10 TABLET, FILM COATED ORAL at 11:12

## 2018-04-14 RX ADMIN — PYRIDOXINE HCL TAB 50 MG 50 MG: 50 TAB at 11:11

## 2018-04-14 RX ADMIN — ESCITALOPRAM 20 MG: 20 TABLET, FILM COATED ORAL at 11:09

## 2018-04-14 RX ADMIN — LACOSAMIDE 100 MG: 100 TABLET, FILM COATED ORAL at 11:10

## 2018-04-14 RX ADMIN — HYDRALAZINE HYDROCHLORIDE 25 MG: 25 TABLET ORAL at 21:59

## 2018-04-14 RX ADMIN — METRONIDAZOLE 500 MG: 500 TABLET, FILM COATED ORAL at 05:03

## 2018-04-14 RX ADMIN — FUROSEMIDE 20 MG: 20 TABLET ORAL at 11:09

## 2018-04-14 RX ADMIN — FOLIC ACID 1 MG: 1 TABLET ORAL at 11:09

## 2018-04-14 NOTE — PLAN OF CARE
Problem: Patient Care Overview  Goal: Plan of Care Review  Outcome: Ongoing (interventions implemented as appropriate)   04/14/18 1707   Patient Care Overview   IRF Plan of Care Review progress ongoing, continue   Progress, Functional Goals demonstrating adequate progress   Coping/Psychosocial   Plan of Care Reviewed With patient   OTHER   Outcome Summary PT WORKED C OT & PT, WOUND VAC TO R GROIN, PICC LINE RUE- BLOOD RETURN, PATENT, AMBULATES C WALKER, MEDS C THIN LIQUIDS, BS WNL     Goal: Individualization and Mutuality  Outcome: Ongoing (interventions implemented as appropriate)    Goal: Discharge Needs Assessment  Outcome: Ongoing (interventions implemented as appropriate)    Goal: Home Safety Plan  Outcome: Ongoing (interventions implemented as appropriate)    Goal: Coping Plan  Outcome: Ongoing (interventions implemented as appropriate)    Goal: Community Reintegration Plan  Outcome: Ongoing (interventions implemented as appropriate)      Problem: Skin Injury Risk (Adult)  Goal: Skin Health and Integrity  Outcome: Ongoing (interventions implemented as appropriate)      Problem: Fall Risk (Adult)  Goal: Absence of Fall  Outcome: Ongoing (interventions implemented as appropriate)      Problem: Mobility, Physical Impaired (Adult)  Goal: Enhanced Mobility Skills  Outcome: Ongoing (interventions implemented as appropriate)    Goal: Enhanced Functional Ability  Outcome: Ongoing (interventions implemented as appropriate)

## 2018-04-14 NOTE — PROGRESS NOTES
LOS: 3 days   Patient Care Team:  Stanislaw Esposito MD as PCP - General    Chief Complaint: same    Subjective     History of Present Illness    Subjective Pt is awake and alert. No new issues.     History taken from: patient    Objective     Vital Signs  Temp:  [97 °F (36.1 °C)-98 °F (36.7 °C)] 97.5 °F (36.4 °C)  Heart Rate:  [65-80] 70  Resp:  [18] 18  BP: (121-188)/(67-77) 137/70    Objective exam unchanged    Results Review:     I reviewed the patient's new clinical results.    Medication Review:     Assessment/Plan     Active Problems:    Subarachnoid hemorrhage      Assessment & PlanContinue to prepare for dc.     Joseluis Mijares MD  04/14/18  12:12 PM    Time:

## 2018-04-14 NOTE — PROGRESS NOTES
Inpatient Rehabilitation Functional Measures Assessment    Functional Measures  BERONICA Eating:  Activity was not observed.  BERONICA Grooming: Branch  [facility] Bathing:  [facility] Upper Body Dressing:  [facility] Lower Body Dressing:  [facility] Toileting:  [facility] Bladder Management  Level of Assistance:  [place]  Frequency/Number of Accidents this Shift:  [facility] Bowel Management  Level of Assistance: [place]  Frequency/Number of Accidents this Shift: [facility] Bed/Chair/Wheelchair Transfer:  [facility] Toilet Transfer:  [facility] Tub/Shower Transfer:  [place]    Previously Documented Mode of Locomotion at Discharge: Field  BERONICA Expected Mode of Locomotion at Discharge: [facility] Walk/Wheelchair:  [facility] Stairs:  [facility] Comprehension:  Auditory comprehension is the usual mode. Patient does not  comprehend complex/abstract information in their primary language without  assistance from a helper. Comprehension Score = 5, Supervision. Patient  comprehends basic daily needs or ideas greater than 90% of the time. Patient  requires stand by/rare prompting. No assistive devices were required.  BERONICA Expression:  Vocal expression is the usual mode. Patient does not express  complex/abstract information in their primary language without a helper.  Expression Score = 3, Moderate Prompting. Patient expresses basic daily needs or  ideas 50-74% of the time. Patient requires moderate/some prompting. No assistive  devices were required.  BERONICA Social Interaction:  Social Interaction Score = 5, Supervision.  Patient may  require encouragement to initiate participation. Patient requires directing only  under stressful or unfamiliar conditions, but less than 10% of the time, for the  following behavior(s):  [place]ON[place] Problem Solving:  Patient does not make appropriate decisions in order to  solve complex problems without assistance from a helper. Problem Solving Score =  5, Supervision.  Patient makes appropriate decisions  in order to solve routine  problems with directing only under stressful or unfamiliar conditions, but no  more than 10% of the time, for the following behavior(s):  BERONICA Memory:  Activity was not observed.    Therapy Mode Minutes  Occupational Therapy: Branch  Physical Therapy: Branch  Speech Language Pathology:  Individual: 60 minutes.    Signed by: Keya Varghese, SLP

## 2018-04-14 NOTE — PROGRESS NOTES
Inpatient Rehabilitation Plan of Care Note    Plan of Care  Care Plan Reviewed - No updates at this time.    Safety    Performed Intervention(s)  Safety rounds; call light/items within easy reach  Bed Alarm      Body Function Structure    Performed Intervention(s)  Skin assessment every shift  Dressing changes/Wound Care as order  Turning      Sphincter Control    Performed Intervention(s)  Monitor I&O  Timed voids  Use incontinent products      Psychosocial    Performed Intervention(s)  Encourage to verbalize concerns  Offer diversional activities    Signed by: Lucita De La O RN

## 2018-04-14 NOTE — NURSING NOTE
NOTIFIED DR. DIAZ OF PAIN REPORTED FROM PT AROUND WOUND VAC SITE. APPEARS SUCTION IS TOO STRONG. DECREASED SUCTION TO 50 PER MD ORDER. WILL CONTINUE TO MONITOR.

## 2018-04-14 NOTE — THERAPY TREATMENT NOTE
Inpatient Rehabilitation - Speech Language Pathology Treatment Note  [facility]     Patient Name: Herlinda Ta  : 1940  MRN: [id number]  Today's Date: 2018         Admit Date: 2018    Visit Dx:      ICD-10-CM ICD-9-CM   1. Subarachnoid hemorrhage I60.9 430     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   • Cerebral vasospasm   • Cervical pain (neck)   • Depressed   • Depression   • Dyspepsia   • Acid reflux   • Groin hematoma   • HTN, goal below 140/90   • HLD (hyperlipidemia)   • Infection of artery   • Iron deficiency anemia   • MRSA (methicillin resistant staph aureus) culture positive   • Rheumatoid arthritis   • Urinary incontinence, mixed   • Subarachnoid hemorrhage     Therapy Treatment  Pt seen on this date for cognitive-linguistic therapy  S:  Patient was alert and cooperative. Aware of and intermittently frustrated by deficits.  O:  Completed some cognitvie-linguistic testing on this date. Results are as follows:  80% with abstraction  100% with inductive reasoning  100% with convergent thinking with only minimal cues needed  Able to write her name and address. Able to write 2/3 words from dictation. Able to write 1/2 sentences when provided with a word. Able to fill out a check WNL.  90% reading and answering simple yes/no questions  Able read short paragraph and answer questions with 100% accuracy.  A:  On this date expressive aphasia noted both verbally and in written form. Patient often able to think of what she wanted to say or write but had difficulty putting it together to express it. Both semantic and phonemic paraphasias were noted. Patient is aware of her paraphasias and often self-corrects. Discussed aphasia, paraphasias, communication strategies, and managing  stress/frustration with patient.  P:  Speech therapy to continue to treat to target cognitive-linguistic deficits as needed.     Therapy Treatment / Health Promotion    Treatment Time/Intention  Document Type: therapy note (daily note) (04/14/18 1200 : Keya Varghese, MS CCC-SLP)  Mode of Treatment: speech-language pathology (04/14/18 1200 : Keya Varghese, MS CCC-SLP)  Patient/Family Observations: Alert and cooperative. (04/14/18 1200 : Keya Varghese, MS CCC-SLP)    Vitals/Pain/Safety       Cognition, Communication, Swallow  Cognitive Assessment Intervention- SLP  Cognitive Function (Cognition): mild impairment (04/14/18 1200 : Keya Varghese, MS JONES-SLP)  Orientation Status (Cognition): WFL (04/14/18 1200 : Keya Varghese, MS CCC-SLP)    Outcome Summary         EDUCATION  The patient has been educated in the following areas:   Cognitive Impairment Communication Impairment.    SLP Recommendation and Plan   SLP to continue to treat for cognitive-linguistic deficits.        SLP GOALS     Row Name 04/14/18 1200 04/13/18 1300 04/13/18 1000       Memory Skills Goal 1 (SLP)    Improve Memory Skills Through Goal 1 (SLP)  --  -- recalling unrelated word lists with an imposed delay  -KB    Progress (Memory Skills Goal 1, SLP)  --  -- with moderate cues (50-74%)  -KB    Progress/Outcomes (Memory Skills Goal 1, SLP)  --  -- goal ongoing  -KB    Comment (Memory Skills Goal 1, SLP)  --  -- recalled 1/3 items after 3 minute delay; MOD cues for 3/3  -KB       Memory Skills Goal 2 (SLP)    Improve Memory Skills Through Goal 2 (SLP)  --  -- recall details from a word list;select a word from a list by exclusion;repeat list in sequential order;90%;independently (over 90% accuracy)   NEW GOAL  -KB    Time Frame (Memory Skills Goal 2, SLP)  --  -- by discharge  -KB       Memory Skills Goal (SLP)    Improve Memory Skills Through Goal (SLP)  --  -- immediate recall of digit string,  words, sentences  -KB    Progress (Memory Skills Goal, SLP)  --  -- 60%;independently (over 90% accuracy)  -KB    Progress/Outcomes (Memory Skills Goal, SLP)  --  -- goal ongoing  -KB       Organizational Skills Goal 1 (SLP)    Improve Thought Organization Through Goal 1 (SLP)  -- completing a verbal sequencing task  -KB completing a divergent naming task;completing a convergent naming task  -KB    Progress (Thought Organization Skills Goal 1, SLP)  -- 90%;independently (over 90% accuracy)  -KB 90%;independently (over 90% accuracy)  -KB    Progress/Outcomes (Thought Organization Skills Goal 1, SLP)  -- goal met  -KB goal met  -KB    Comment (Thought Organization Skills Goal 1, SLP)  -- ongoing goal: stating similarities/differences  -KB  --       Reasoning Goal 1 (SLP)    Improve Reasoning Through Goal 1 (SLP)  -- complete deductive reasoning task   word deduction  -KB  --    Progress (Reasoning Goal 1, SLP)  -- 60%;independently (over 90% accuracy);100%;with moderate cues (50-74%)  -KB  --    Progress/Outcomes (Reasoning Goal 1, SLP)  -- goal ongoing  -KB  --       Functional Math Skills Goal 1 (SLP)    Improve Functional Math Skills Through Goal 1 (SLP)  -- complete word problems involving time;complete word problems involving money  -KB  --    Progress (Functional Math Skills Goal 1, SLP)  -- 100%;independently (over 90% accuracy)  -KB  --    Progress/Outcomes (Functional Math Skills Goal 1, SLP)  -- goal met  -KB  --    Comment (Functional Math Skills Goal 1, SLP)  -- ongoing goal: functional math task, checkbook task  -KB  --       Executive Functional Skills Goal 1 (SLP)    Improve Executive Function Skills Goal 1 (SLP) exhibit cognitive flexibility  -JJ exhibit cognitive flexibility   stating multiple definitions  -KB  --    Progress (Executive Function Skills Goal 1, SLP) 80%;90%;with minimal cues (75-90%);other (comment)   Providing multiple definitions  -JJ 50%;independently (over 90% accuracy);100%;with  moderate cues (50-74%)  -KB  --    Progress/Outcomes (Executive Function Skills Goal 1, SLP)  -- goal ongoing  -KB  --    Row Name 04/12/18 1600             Memory Skills Goal 1 (SLP)    Improve Memory Skills Through Goal 1 (SLP) recalling unrelated word lists with an imposed delay;visual memory task;90%;independently (over 90% accuracy);listen to a paragraph and answer questions;read a paragraph and answer questions;listen to multiple paragraphs and answer questions;read multiple paragraphs and answer questions;recall details of the day  -AW      Time Frame (Memory Skills Goal 1, SLP) by discharge  -AW         Organizational Skills Goal 1 (SLP)    Improve Thought Organization Through Goal 1 (SLP) completing a divergent naming task;completing a convergent naming task;completing a verbal sequencing task;completing mental manipulation task;90%;independently (over 90% accuracy)  -AW      Time Frame (Thought Organization Skills Goal 1, SLP) by discharge  -AW         Reasoning Goal 1 (SLP)    Improve Reasoning Through Goal 1 (SLP) complete high level reasoning task;complete deductive reasoning task;complete mental flexibility task;complete logic/creative thinking task;90%;independently (over 90% accuracy)  -AW      Time Frame (Reasoning Goal 1, SLP) by discharge  -AW         Functional Math Skills Goal 1 (SLP)    Improve Functional Math Skills Through Goal 1 (SLP) complete functional math task;complete word problems involving time;complete word problems involving money;complete checkbook task;90%;independently (over 90% accuracy)  -AW      Time Frame (Functional Math Skills Goal 1, SLP) by discharge  -AW         Executive Functional Skills Goal 1 (SLP)    Improve Executive Function Skills Goal 1 (SLP) organization/planning activity;time management activity;complex organization/planning activity;home management activity;perform self-correction;exhibit cognitive flexibility;90%;independently (over 90% accuracy)  -AW       Time Frame (Executive Function Skills Goal 1, SLP) by discharge  -AW        User Key  (r) = Recorded By, (t) = Taken By, (c) = Cosigned By    Initials Name Provider Type    BHAVANI Varghese MS CCC-SLP Speech and Language Pathologist    NALDO Mckinley, MS CCC-SLP Speech and Language Pathologist    KB Katherine L Bruton Speech and Language Pathologist             SLP Outcome Measures (last 72 hours)      SLP Outcome Measures     Row Name 04/12/18 1600             SLP Outcome Measures    Outcome Measure Used? Adult NOMS  -AW         FCM Scores    Memory FCM Score 4  -AW        User Key  (r) = Recorded By, (t) = Taken By, (c) = Cosigned By    Initials Name Effective Dates    NALDO Mckinley, MS CCC-SLP 04/13/15 -             Time Calculation:           Time Calculation- SLP     Row Name 04/14/18 1221             Time Calculation- SLP    SLP Start Time 0900  -J      SLP Stop Time 1000  -J      SLP Time Calculation (min) 60 min  -JJ        User Key  (r) = Recorded By, (t) = Taken By, (c) = Cosigned By    Initials Name Provider Type    BHAVANI Varghese, MS CCC-SLP Speech and Language Pathologist          Therapy Charges for Today     Code Description Service Date Service Provider Modifiers Qty    14535648151 HC ST DEV OF COGN SKILLS EACH 15 MIN 4/14/2018 Keya Varghese, MS CCC-SLP  4             ADULT NOMS (last 72 hours)      Adult NOMS     Row Name 04/12/18 1600                   FCM Scores    Memory FCM Score 4  -AW          User Key  (r) = Recorded By, (t) = Taken By, (c) = Cosigned By    Initials Name Effective Dates    NALDO Mckinley MS CCC-SLP 04/13/15 -                Keya Varghese MS CCC-SLP  4/14/2018

## 2018-04-14 NOTE — THERAPY TREATMENT NOTE
Inpatient Rehabilitation - Occupational Therapy Treatment Note    [facility]     Patient Name: Herlinda Ta  : 1940  MRN: [id number]    Today's Date: 2018                 Admit Date: 2018      Visit Dx:  No diagnosis found.    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   • Cerebral vasospasm   • Cervical pain (neck)   • Depressed   • Depression   • Dyspepsia   • Acid reflux   • Groin hematoma   • HTN, goal below 140/90   • HLD (hyperlipidemia)   • Infection of artery   • Iron deficiency anemia   • MRSA (methicillin resistant staph aureus) culture positive   • Rheumatoid arthritis   • Urinary incontinence, mixed   • Subarachnoid hemorrhage         Therapy Treatment          IRF Treatment Summary     Row Name 18 1200 18 0934          Evaluation/Treatment Time and Intent    Document Type therapy note (daily note)  -JJ therapy note (daily note)  -AF     Mode of Treatment speech-language pathology  -JJ occupational therapy  -AF     Patient/Family Observations Alert and cooperative.  -JJ NSG okayed to shower with wound vac  -AF     Start Time (Evaluation/Treatment) 0900  -JJ  --     Stop Time (Evaluation/Treatment) 0930  -JJ  --     Recorded by [JJ] Keya Varghese, MS CCC-SLP [AF] Keren Lowe OTR     Row Name 18 0934             Cognition/Psychosocial- PT/OT    Orientation Status (Cognition) oriented x 4  -AF      Safety Deficit (Cognitive) mild deficit  -AF      Recorded by [AF] ANNIE Espinoza      Row Name 18 1200             Cognitive Assessment Intervention- SLP    Cognitive Function (Cognition) mild impairment  -JJ      Orientation Status (Cognition) WFL  -JJ      Recorded by [JJ] Keya Varghese, MS CCC-SLP      Row Name  04/14/18 0934             Bed Mobility Assessment/Treatment    Supine-Sit [place] (Bed Mobility) verbal cues;supervision  -AF      Comment (Bed Mobility) used bed rail  -AF      Recorded by [AF] ANNIE Espinoza      Row Name 04/14/18 0934             Transfer Assessment/Treatment    Sit-Stand [place] (Transfers) contact guard  -AF      Stand-Sit [place] (Transfers) contact guard  -AF      [place] Level (Toilet Transfer) contact guard  -AF      Assistive Device (Toilet Transfer) commode;grab bars/safety frame;walker, front-wheeled  -AF      [place] Level (Shower Transfer) contact guard;verbal cues  -AF      Assistive Device (Shower Transfer) walker, front-wheeled;shower chair;grab bars/tub rail  -AF      Recorded by [AF] Keren Lowe OTR      Row Name 04/14/18 0934             Sit-Stand Transfer    Assistive Device (Sit-Stand Transfers) walker, front-wheeled  -AF      Recorded by [AF] Keren Lowe R      Row Name 04/14/18 0934             Stand-Sit Transfer    Assistive Device (Stand-Sit Transfers) walker, front-wheeled  -AF      Recorded by [AF] Keren Lowe R      Row Name 04/14/18 0934             Toilet Transfer    Type (Toilet Transfer) stand pivot/stand step  -AF      Recorded by [AF] Keren Lowe R      Row Name 04/14/18 0934             Shower Transfer    Type (Shower Transfer) stand pivot/stand step  -AF      Recorded by [AF] Keren Lowe R      Row Name 04/14/18 0934             Basic Activities of Daily Living (BADLs)    Basic Activities of Daily Living bathing;upper body dressing;lower body dressing;grooming;toileting  -AF      Recorded by [AF] Keren Lowe OTR      Row Name 04/14/18 0934             Bathing Assessment/Treatment    Bathing [place] Level bathing skills;contact guard assist;verbal cues  -AF      Assistive Device (Bathing) hand held shower spray hose;grab bar/tub rail;shower chair  -AF      Bathing Position supported  sitting;supported standing  -AF      Recorded by [AF] Keren Lowe JASR      Row Name 04/14/18 0934             Upper Body Dressing Assessment/Treatment    Upper Body Dressing Task upper body dressing skills;supervision  -AF      Upper Body Dressing Position supported sitting  -AF      Recorded by [AF] Keren Lowe R      Row Name 04/14/18 0934             Lower Body Dressing Assessment/Treatment    Lower Body Dressing [place] Level doff;don;pants/bottoms;shoes/slippers;socks;underwear;minimum assist (75% patient effort);verbal cues  -AF      Lower Body Dressing Position supported standing;unsupported sitting  -AF      Comment (Lower Body Dressing) assist with starting sock on R foot edcuated on donnign RLE in pants and underware first then L also crossing leg to start pants instead of bending foward  -AF      Recorded by [AF] Keren Lowe R      Row Name 04/14/18 0934             Grooming Assessment/Treatment    Grooming [place] Level grooming skills;supervision  -AF      Grooming Position sink side;supported sitting  -AF      Comment (Grooming) requested to sit to complete grooming tasks secdoanry to fatigue after showering   -AF      Recorded by [AF] Keren Lowe R      Row Name 04/14/18 0934             Toileting Assessment/Treatment    Toileting [place] Level toileting skills;contact guard assist;verbal cues  -AF      Assistive Device Use (Toileting) grab bar/safety frame;raised toilet seat  -AF      Toileting Position supported standing;unsupported sitting  -AF      Recorded by [AF] Keren Lowe OTR      Row Name 04/14/18 0934             Vision Assessment/Intervention    Vision Assessment Comment no visual deficits noted during ADLs able to scan and gather needed items without depth issues of verbal cues from therapist  -AF      Recorded by [AF] Keren Lowe R      Row Name 04/14/18 0934             Pain Scale: Numbers Pre/Post-Treatment    Pain Scale:  Numbers, Pretreatment 0/10 - no pain  -AF      Recorded by [AF] Keren Lowe, OTR      Row Name 04/14/18 0934             Static Sitting Balance    Level of Onward (Unsupported Sitting, Static Balance) supervision  -AF      Sitting Position (Unsupported Sitting, Static Balance) --   seated on shower chair   -AF      Recorded by [AF] Keren Lowe, OTR      Row Name 04/14/18 0934             Static Standing Balance    Level of Onward (Supported Standing, Static Balance) contact guard assist  -AF      Assistive Device Utilized (Supported Standing, Static Balance) --   standign wtih grab bar  -AF      Recorded by [AF] Keren Lowe, OTR      Row Name 04/14/18 0934             Positioning and Restraints    Pre-Treatment Position in bed  -AF      Post Treatment Position wheelchair  -AF      In Wheelchair sitting;call light within reach;encouraged to call for assist;exit alarm on   in room waiting for SLP  -AF      Recorded by [AF] Keren Lowe OTR      Row Name 04/14/18 1200             Daily Summary of Progress (SLP)    Functional Goal Overall Progress: Speech Language Pathology progressing toward functional goals as expected  -JJ      Recommendations: Speech Language Pathology Continue speech therapy to target both cognition and language  -JJ      Recorded by [JJ] Keya Varghese MS CCC-SLP        User Key  (r) = Recorded By, (t) = Taken By, (c) = Cosigned By    Initials Name Effective Dates    JJ Keya Varghese MS CCC-SLP 03/07/18 -     AF Keren Lowe, OTR 04/03/18 -           Wound Right groin (Active)   Dressing Appearance dry;intact;no drainage 4/14/2018 10:55 AM   Dressing Care, Wound other (see comments) 4/13/2018  2:36 PM   Wound Output (mL) 450 4/13/2018  2:36 PM       NPWT (Negative Pressure Wound Therapy) R groin (Active)   Therapy Setting continuous therapy 4/14/2018 10:55 AM         OT Recommendation and Plan                       OT IRF HERBERT Landa  Name 04/12/18 1524             Transfer Goal 1 (OT-IRF)    Activity/Assistive Device (Transfer Goal 1, OT-IRF) toilet;shower chair;walk-in shower;tub  -SO      [place] Level (Transfer Goal 1, OT-IRF) supervision required;verbal cues required  -SO      Time Frame (Transfer Goal 1, OT-IRF) long term goal (LTG);2 weeks  -SO         LB Dressing Goal 1 (OT-IRF)    Activity/Device (LB Dressing Goal 1, OT-IRF) lower body dressing  -SO      [place] (LB Dressing Goal 1, OT-IRF) supervision required  -SO      Time Frame (LB Dressing Goal 1, OT-IRF) long term goal (LTG);2 weeks  -SO         Toileting Goal 1 (OT-IRF)    Activity/Device (Toileting Goal 1, OT-IRF) toileting skills, all;grab bar/safety frame  -SO      [place] Level (Toileting Goal 1, OT-IRF) supervision required  -SO      Time Frame (Toileting Goal 1, OT-IRF) long term goal (LTG);2 weeks  -SO         Strength Goal 1 (OT-IRF)    Strength Goal 1 (OT-IRF) Pt to increase overall BUE strength to 4-/5 to assist with functional activities and ADLs.  -SO      Time Frame (Strength Goal 1, OT-IRF) long term goal (LTG);2 weeks  -SO         Caregiver Training Goal 1 (OT-IRF)    Caregiver Training Goal 1 (OT-IRF) Pt and caregiver to be independent with AE, HEP, home safety, etc. at d/c.  -SO      Time Frame (Caregiver Training Goal 1, OT-IRF) long term goal (LTG);2 weeks  -SO        User Key  (r) = Recorded By, (t) = Taken By, (c) = Cosigned By    Initials Name Provider Type    SO Margo Kyra Jacob, OTR Occupational Therapist                 Time Calculation:           Time Calculation- OT     Row Name 04/14/18 1220 04/14/18 0943          Time Calculation- OT    OT Start Time 1200  -AF 0800  -AF     OT Stop Time 1215  -AF 0900  -AF     OT Time Calculation (min) 15 min  -AF 60 min  -AF       User Key  (r) = Recorded By, (t) = Taken By, (c) = Cosigned By    Initials Name Provider Type    AF Keren Lowe, OTR Occupational Therapist             Therapy  Charges for Today     Code Description Service Date Service Provider Modifiers Qty    04922718543 HC OT SELF CARE/MGMT/TRAIN EA 15 MIN 4/13/2018 Keren Lowe OTR GO 2    59784034649 HC OT THER PROC EA 15 MIN 4/13/2018 Keren Lowe OTR GO 1    15650939553 HC OT NEUROMUSC RE EDUCATION EA 15 MIN 4/13/2018 Keren Lowe OTR GO 1    32295493637 HC OT SELF CARE/MGMT/TRAIN EA 15 MIN 4/14/2018 Keren Lowe OTR GO 4    00285267176 HC OT SELF CARE/MGMT/TRAIN EA 15 MIN 4/14/2018 Keren Lowe OTR GO 1                   Keren Lowe OTSANGITA  4/14/2018

## 2018-04-14 NOTE — PROGRESS NOTES
Inpatient Rehabilitation Functional Measures Assessment    Functional Measures  BERONICA Eating:  Branch  [facility] Grooming: Branch  [facility] Bathing:  Branch  [facility] Upper Body Dressing:  Branch  [facility] Lower Body Dressing:  [facility] Toileting:  [facility] Bladder Management  Level of Assistance:  Logan  Frequency/Number of Accidents this Shift:  [facility] Bowel Management  Level of Assistance: [place]  Frequency/Number of Accidents this Shift: [facility] Bed/Chair/Wheelchair Transfer:  Activity was not observed.  BERONICA Toilet Transfer:  [facility] Tub/Shower Transfer:  Logan    Previously Documented Mode of Locomotion at Discharge: Field  BERONICA Expected Mode of Locomotion at Discharge: [facility] Walk/Wheelchair:  WHEELCHAIR OBSERVATION   Activity was not observed.    WALK OBSERVATION   Walk Distance Scale = 3.  Distance walked is greater than 150 feet. Walk Score  = 4.  Patient performs 75% or more of effort and requires minimal assistance.  Incidental help/contact guard/steadying was provided. Patient walked a distance  of  160 feet. Patient requires the following assistive device(s): Rolling  walker.  BERONICA Stairs:  Activity was not observed.    BERONICA Comprehension:  [facility] Expression:  [facility] Social Interaction:  [facility] Problem Solving:  [facility] Memory:  [place]    Therapy Mode Minutes  Occupational Therapy: Branch  Physical Therapy: Individual: 60 minutes.  Speech Language Pathology:  Branch    Signed by: Rafaela Vu PT

## 2018-04-14 NOTE — THERAPY TREATMENT NOTE
Inpatient Rehabilitation - Occupational Therapy Treatment Note    [facility]     Patient Name: Herlinda Ta  : 1940  MRN: [id number]    Today's Date: 2018                 Admit Date: 2018      Visit Dx:  No diagnosis found.    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   • Cerebral vasospasm   • Cervical pain (neck)   • Depressed   • Depression   • Dyspepsia   • Acid reflux   • Groin hematoma   • HTN, goal below 140/90   • HLD (hyperlipidemia)   • Infection of artery   • Iron deficiency anemia   • MRSA (methicillin resistant staph aureus) culture positive   • Rheumatoid arthritis   • Urinary incontinence, mixed   • Subarachnoid hemorrhage         Therapy Treatment          IRF Treatment Summary     Row Name 18             Evaluation/Treatment Time and Intent    Document Type therapy note (daily note)  -AF      Mode of Treatment occupational therapy  -AF      Patient/Family Observations NSG okayed to shower with wound vac  -AF      Recorded by [AF] ANNIE Espinoza      Row Name 18             Cognition/Psychosocial- PT/OT    Orientation Status (Cognition) oriented x 4  -AF      Safety Deficit (Cognitive) mild deficit  -AF      Recorded by [AF] ANNIE Espinoza      Row Name 18             Bed Mobility Assessment/Treatment    Supine-Sit [place] (Bed Mobility) verbal cues;supervision  -AF      Comment (Bed Mobility) used bed rail  -AF      Recorded by [AF] ANNIE Espinoza      Row Name 18             Transfer Assessment/Treatment    Sit-Stand [place] (Transfers) contact guard  -AF      Stand-Sit [place] (Transfers) contact guard  -AF      [place] Level (Toilet Transfer) contact guard  -AF      Assistive  Device (Toilet Transfer) commode;grab bars/safety frame;walker, front-wheeled  -AF      [place] Level (Shower Transfer) contact guard;verbal cues  -AF      Assistive Device (Shower Transfer) walker, front-wheeled;shower chair;grab bars/tub rail  -AF      Recorded by [AF] Keren Lowe R      Row Name 04/14/18 0934             Sit-Stand Transfer    Assistive Device (Sit-Stand Transfers) walker, front-wheeled  -AF      Recorded by [AF] Keren Lowe R      Row Name 04/14/18 0934             Stand-Sit Transfer    Assistive Device (Stand-Sit Transfers) walker, front-wheeled  -AF      Recorded by [AF] Keren Lowe R      Row Name 04/14/18 0934             Toilet Transfer    Type (Toilet Transfer) stand pivot/stand step  -AF      Recorded by [AF] Keren Lowe R      Row Name 04/14/18 0934             Shower Transfer    Type (Shower Transfer) stand pivot/stand step  -AF      Recorded by [AF] Keren Lowe R      Row Name 04/14/18 0934             Basic Activities of Daily Living (BADLs)    Basic Activities of Daily Living bathing;upper body dressing;lower body dressing;grooming;toileting  -AF      Recorded by [AF] Keren Lowe R      Row Name 04/14/18 0934             Bathing Assessment/Treatment    Bathing [place] Level bathing skills;contact guard assist;verbal cues  -AF      Assistive Device (Bathing) hand held shower spray hose;grab bar/tub rail;shower chair  -AF      Bathing Position supported sitting;supported standing  -AF      Recorded by [AF] Keren Lowe R      Row Name 04/14/18 0934             Upper Body Dressing Assessment/Treatment    Upper Body Dressing Task upper body dressing skills;supervision  -AF      Upper Body Dressing Position supported sitting  -AF      Recorded by [AF] Keren Lowe R      Row Name 04/14/18 0934             Lower Body Dressing Assessment/Treatment    Lower Body Dressing [place] Level  doff;don;pants/bottoms;shoes/slippers;socks;underwear;minimum assist (75% patient effort);verbal cues  -AF      Lower Body Dressing Position supported standing;unsupported sitting  -AF      Comment (Lower Body Dressing) assist with starting sock on R foot edcuated on donnign RLE in pants and underware first then L also crossing leg to start pants instead of bending foward  -AF      Recorded by [AF] ANNIE Espinoza      Row Name 04/14/18 0934             Grooming Assessment/Treatment    Grooming [place] Level grooming skills;supervision  -AF      Grooming Position sink side;supported sitting  -AF      Comment (Grooming) requested to sit to complete grooming tasks secdoanry to fatigue after showering   -AF      Recorded by [AF] ANNIE Espinoza      Row Name 04/14/18 0934             Toileting Assessment/Treatment    Toileting [place] Level toileting skills;contact guard assist;verbal cues  -AF      Assistive Device Use (Toileting) grab bar/safety frame;raised toilet seat  -AF      Toileting Position supported standing;unsupported sitting  -AF      Recorded by [AF] Keren Lowe ANNIE      Row Name 04/14/18 0934             Vision Assessment/Intervention    Vision Assessment Comment no visual deficits noted during ADLs able to scan and gather needed items without depth issues of verbal cues from therapist  -AF      Recorded by [AF] Keren Lowe ANNIE      Row Name 04/14/18 0934             Pain Scale: Numbers Pre/Post-Treatment    Pain Scale: Numbers, Pretreatment 0/10 - no pain  -AF      Recorded by [AF] ANNIE Espinoza      Row Name 04/14/18 0934             Static Sitting Balance    Level of [place] (Unsupported Sitting, Static Balance) supervision  -AF      Sitting Position (Unsupported Sitting, Static Balance) --   seated on shower chair   -AF      Recorded by [AF] ANNIE Espinoza      Row Name 04/14/18 0934             Static Standing Balance    Level of [place]  (Supported Standing, Static Balance) contact guard assist  -AF      Assistive Device Utilized (Supported Standing, Static Balance) --   standign wtih grab bar  -AF      Recorded by [AF] ANNIE Espinoza      Row Name 04/14/18 0934             Positioning and Restraints    Pre-Treatment Position in bed  -AF      Post Treatment Position wheelchair  -AF      In Wheelchair sitting;call light within reach;encouraged to call for assist;exit alarm on   in room waiting for SLP  -AF      Recorded by [AF] ANNIE Espinoza        User Key  (r) = Recorded By, (t) = Taken By, (c) = Cosigned By    Initials Name Effective Dates    AF ANNIE Espinoza 04/03/18 -           Wound Right groin (Active)   Dressing Appearance other (see comments) 4/13/2018 10:05 AM   Base clean;moist;red/granulating;slough 4/13/2018 10:05 AM   Red (%), Wound Tissue Color 90 4/13/2018 10:05 AM   Yellow (%), Wound Tissue Color 10 4/13/2018 10:05 AM   Periwound intact;dry 4/13/2018 10:05 AM   Periwound Temperature warm 4/13/2018 10:05 AM   Edges open 4/13/2018 10:05 AM   Wound length (cm) 4 cm 4/13/2018 10:05 AM   Wound width (cm) 11 cm 4/13/2018 10:05 AM   Wound depth (cm) 2.5 cm 4/13/2018 10:05 AM   Tunneling [Depth (cm)/Location] 8cms at 3; 3 cms at 4; 3cms at 9; 4 cms at 10; 6.2 cms at 11 4/13/2018 10:05 AM   Drainage Characteristics/Odor serosanguineous 4/13/2018 10:05 AM   Drainage Amount moderate 4/13/2018 10:05 AM   Care, Wound cleansed with;sterile half normal saline 4/13/2018 10:05 AM   Dressing Care, Wound other (see comments) 4/13/2018  2:36 PM   Periwound Care, Wound barrier film applied 4/13/2018 10:05 AM   Wound Output (mL) 450 4/13/2018  2:36 PM       NPWT (Negative Pressure Wound Therapy) R groin (Active)   Therapy Setting continuous therapy 4/13/2018 10:05 AM   Dressing foam, black;foam, white;other (see comments) 4/13/2018 10:05 AM   Pressure Setting 75 mmHg 4/13/2018 10:05 AM   Sponges Inserted other (see comments)  4/13/2018 10:05 AM   Sponges Removed 2 4/13/2018 10:05 AM   Finger sweep complete Yes 4/13/2018 10:05 AM         OT Recommendation and Plan                       OT IRF GOALS     Row Name 04/12/18 1524             Transfer Goal 1 (OT-IRF)    Activity/Assistive Device (Transfer Goal 1, OT-IRF) toilet;shower chair;walk-in shower;tub  -SO      [place] Level (Transfer Goal 1, OT-IRF) supervision required;verbal cues required  -SO      Time Frame (Transfer Goal 1, OT-IRF) long term goal (LTG);2 weeks  -SO         LB Dressing Goal 1 (OT-IRF)    Activity/Device (LB Dressing Goal 1, OT-IRF) lower body dressing  -SO      [place] (LB Dressing Goal 1, OT-IRF) supervision required  -SO      Time Frame (LB Dressing Goal 1, OT-IRF) long term goal (LTG);2 weeks  -SO         Toileting Goal 1 (OT-IRF)    Activity/Device (Toileting Goal 1, OT-IRF) toileting skills, all;grab bar/safety frame  -SO      [place] Level (Toileting Goal 1, OT-IRF) supervision required  -SO      Time Frame (Toileting Goal 1, OT-IRF) long term goal (LTG);2 weeks  -SO         Strength Goal 1 (OT-IRF)    Strength Goal 1 (OT-IRF) Pt to increase overall BUE strength to 4-/5 to assist with functional activities and ADLs.  -SO      Time Frame (Strength Goal 1, OT-IRF) long term goal (LTG);2 weeks  -SO         Caregiver Training Goal 1 (OT-IRF)    Caregiver Training Goal 1 (OT-IRF) Pt and caregiver to be independent with AE, HEP, home safety, etc. at d/c.  -SO      Time Frame (Caregiver Training Goal 1, OT-IRF) long term goal (LTG);2 weeks  -SO        User Key  (r) = Recorded By, (t) = Taken By, (c) = Cosigned By    Initials Name Provider Type    WALTER Jacob OTR Occupational Therapist                 Time Calculation:           Time Calculation- OT     Row Name 04/14/18 0943             Time Calculation- OT    OT Start Time 0800  -AF      OT Stop Time 0900  -AF      OT Time Calculation (min) 60 min  -AF        User Key  (r) = Recorded  By, (t) = Taken By, (c) = Cosigned By    Initials Name Provider Type    AF Keren Lowe, OTR Occupational Therapist             Therapy Charges for Today     Code Description Service Date Service Provider Modifiers Qty    84025381961 HC OT SELF CARE/MGMT/TRAIN EA 15 MIN 4/13/2018 Keren Lowe OTR GO 2    80728513155 HC OT THER PROC EA 15 MIN 4/13/2018 ANNIE Espinoza GO 1    25031948156 HC OT NEUROMUSC RE EDUCATION EA 15 MIN 4/13/2018 Keren Lowe OTR GO 1    62635399160 HC OT SELF CARE/MGMT/TRAIN EA 15 MIN 4/14/2018 ANNIE Espinoza GO 4                   ANNIE Espinoza  4/14/2018

## 2018-04-14 NOTE — PLAN OF CARE
Problem: Patient Care Overview  Goal: Plan of Care Review  Outcome: Ongoing (interventions implemented as appropriate)   04/14/18 0136   Patient Care Overview   IRF Plan of Care Review progress ongoing, continue   Progress, Functional Goals demonstrating adequate progress   Coping/Psychosocial   Plan of Care Reviewed With patient   OTHER   Outcome Summary Patient A&Ox4, continent of B/B, and ambulates with a walker. She has had no complaints of pain and no unsafe behavior. Wound vac draining and PICC flushes and has blood return. Severe bruising to abdominal /flank, in addition to groin and right leg       Problem: Skin Injury Risk (Adult)  Goal: Skin Health and Integrity  Outcome: Ongoing (interventions implemented as appropriate)   04/14/18 0136   Skin Injury Risk (Adult)   Skin Health and Integrity making progress toward outcome       Problem: Fall Risk (Adult)  Goal: Absence of Fall  Outcome: Ongoing (interventions implemented as appropriate)   04/14/18 0136   Fall Risk (Adult)   Absence of Fall making progress toward outcome       Problem: Mobility, Physical Impaired (Adult)  Goal: Enhanced Mobility Skills  Outcome: Ongoing (interventions implemented as appropriate)   04/14/18 0136   Mobility, Physical Impaired (Adult)   Enhanced Mobility Skills making progress toward outcome     Goal: Enhanced Functional Ability  Outcome: Ongoing (interventions implemented as appropriate)   04/14/18 0136   Mobility, Physical Impaired (Adult)   Enhanced Functional Ability making progress toward outcome

## 2018-04-14 NOTE — PROGRESS NOTES
Inpatient Rehabilitation Functional Measures Assessment    Functional Measures  BERONICA Eating:  Branch  [facility] Grooming: Branch  [facility] Bathing:  Branch  [facility] Upper Body Dressing:  Upper Body Dressing was not observed this shift  because patient dressed/undressed in pajamas/gown only. .  BERONICA Lower Body Dressing:  Lower Body Dressing was not observed this shift  because patient dressed/undressed in pajamas/gown only.  BERONICA Toileting:  Toileting Score = 4.  Patient requires minimal assistance for  toileting, such as steadying for balance while cleansing or adjusting clothes.  Patient requires the following assistive device(s): Grab bar.    BERONICA Bladder Management  Level of Assistance:  Bladder Score = 5.  Patient is supervision/set-up for  bladder management, requiring: Stand by assistance. No assistive devices were  required.  Frequency/Number of Accidents this Shift:  Bladder accidents this shift:  0 .  Patient has not had an accident this shift.    BERONICA Bowel Management  Level of Assistance: Bowel Score = 5.  Patient is supervision/set-up for bowel  management, requiring: Stand by assistance. No assistive devices were required.    Frequency/Number of Accidents this Shift: Bowel accidents this shift: 0 .  Patient has not had an accident this shift.    BERONICA Bed/Chair/Wheelchair Transfer:  Bed/chair/wheelchair Transfer Score = 5.  Patient is supervision/set-up for transferring to and from the  bed/chair/wheelchair, requiring: Stand by assistance. Patient requires the  following assistive device(s): Walker.  BERONICA Toilet Transfer:  Toilet Transfer Score = 5.  Patient is supervision/set-up  for transferring to and from the toilet/commode, requiring: Patient requires the  following assistive device(s): Grab bars.  BERONICA Tub/Shower Transfer:  Branch    Previously Documented Mode of Locomotion at Discharge: Field  BERONICA Expected Mode of Locomotion at Discharge: Branch  [facility] Walk/Wheelchair:  Branch  [facility] Stairs:  Branch    [facility] Comprehension:   Branch  BERONICA Expression:  Branch  BERONICA Social Interaction:  Branch  BERONICA Problem Solving:  Branch  BERONICA Memory:  Branch    Therapy Mode Minutes  Occupational Therapy: Branch  Physical Therapy: Branch  Speech Language Pathology:  Branch    Signed by: RUTH Miranda

## 2018-04-14 NOTE — PROGRESS NOTES
Inpatient Rehabilitation Functional Measures Assessment    Functional Measures  BERONICA Eating:  [facility] Grooming: [facility] Bathing:  [facility] Upper Body Dressing:  [facility] Lower Body Dressing:  [facility] Toileting:  [facility] Bladder Management  Level of Assistance:  [place]  Frequency/Number of Accidents this Shift:  [facility] Bowel Management  Level of Assistance: [place]  Frequency/Number of Accidents this Shift: [facility] Bed/Chair/Wheelchair Transfer:  [facility] Toilet Transfer:  [facility] Tub/Shower Transfer:  [place]    Previously Documented Mode of Locomotion at Discharge: Field  EBRONICA Expected Mode of Locomotion at Discharge: [facility] Walk/Wheelchair:  [facility] Stairs:  [facility] Comprehension:  Auditory comprehension is the usual mode. Comprehension  Score = 6, Modified [place].  Patient comprehends complex/abstract  information in their primary language, requiring:  BERONICA Expression:  Vocal expression is the usual mode. Expression Score = 6,  Modified Independent.  Patient expresses complex/abstract information in their  primary language, requiring:  BERONICA Social Interaction:  Social Interaction Score = 6, Modified Independent.  Patient is modified independent for social interaction, requiring:  BERONICA Problem Solving:  Problem Solving Score = 6, Modified [place].  Patient  makes appropriate decisions in order to solve complex problems, but requires  extra time.  BERONICA Memory:  Memory Score = 6, Modified [place].  Patient is modified  independent for memory, requiring:    Therapy Mode Minutes  Occupational Therapy: Branch  Physical Therapy: Branch  Speech Language Pathology:  Branch    Signed by: Lucita De La O RN

## 2018-04-14 NOTE — THERAPY TREATMENT NOTE
Inpatient Rehabilitation - Physical Therapy Treatment Note  [facility]     Patient Name: Herlinda Ta  : 1940  MRN: [id number]    Today's Date: 2018                 Admit Date: 2018      Visit Dx:      ICD-10-CM ICD-9-CM   1. 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   • Cerebral vasospasm   • Cervical pain (neck)   • Depressed   • Depression   • Dyspepsia   • Acid reflux   • Groin hematoma   • HTN, goal below 140/90   • HLD (hyperlipidemia)   • Infection of artery   • Iron deficiency anemia   • MRSA (methicillin resistant staph aureus) culture positive   • Rheumatoid arthritis   • Urinary incontinence, mixed   • Subarachnoid hemorrhage       Therapy Treatment    Evaluation/Coping    Evaluation/Treatment Time and Intent  Document Type: therapy note (daily note) (18 1030 : Rafaela Vu PT)  Mode of Treatment: physical therapy (18 1030 : Rafaela Vu PT)  Patient/Family Observations: pt seated in WC and agrees to work with PT (18 1030 : Rafaela Vu PT)    Vitals/Pain/Safety         Cognition/Communication    Cognition/Psychosocial- PT/OT  Affect/Mental Status (Cognitive): WNL (18 1030 : Rafaela Vu PT)  Orientation Status (Cognition): oriented x 4 (18 1030 : Rafaela Vu PT)  Follows Commands (Cognition): WNL (18 1030 : Rafaela Vu PT)  Cognitive Assessment Intervention- SLP  Orientation Status (Cognition): WFL (18 1030 : Rafaela Vu PT)    Oral Motor/Eating         Mobility/Basic Activities/Instrumental Activities/Motor/Modality    Sit-Stand Transfer  Sit-Stand [place] (Transfers): contact guard (18 1030 : Rafaela Vu PT)  Assistive Device (Sit-Stand Transfers):  walker, front-wheeled (04/14/18 1030 : Rafaela Vu PT)  Stand-Sit Transfer  Stand-Sit [place] (Transfers): contact guard (04/14/18 1030 : Rafaela Vu PT)  Assistive Device (Stand-Sit Transfers): walker, front-wheeled (04/14/18 1030 : Rafaela Vu PT)  Gait/Stairs Assessment/Training  [place] Level (Gait): contact guard (04/14/18 1030 : Rafaela Vu PT)  Assistive Device (Gait): walker, front-wheeled (04/14/18 1030 : Rafaela Vu PT)  Distance in Feet (Gait): 160 (04/14/18 1030 : Rafaela Vu PT)  Pattern (Gait): step-through (04/14/18 1030 : Rafaela Vu PT)  Deviations/Abnormal Patterns (Gait): genesis decreased (04/14/18 1030 : Rafaela Vu PT)  Right Sided Gait Deviations: heel strike decreased (04/14/18 1030 : Rafaela Vu PT)              ROM/MMT                   Sensory/Myotome/Dermatome/Edema               Posture/Balance/Special Tests/Exercise/Transportation/Sexual Function    Dynamic Balance Activity  Therapeutic Training Performed (Dynamic Balance): side stepping (04/14/18 1030 : Rafaela Vu PT)  Support Needed for Balance (Dynamic Balance Training): uses both upper extremities for support (04/14/18 1030 : Rafaela Vu PT)  Comment (Dynamic Balance Training): static standing in tandem stance with CGA to min assist (04/14/18 1030 : Rafaela Vu PT)    Lower Extremity Standing Therapeutic Exercise  Performed, Standing Lower Extremity (Therapeutic Exercise): hip abduction/adduction, toe raises (04/14/18 1030 : Rafaela Vu PT)  Device, Standing Lower Extremity (Therapeutic Exercise): other (see comments), machine based exercise (nehemias bars) (04/14/18 1030 : Rafaela Vu PT)  Exercise Type, Standing Lower Extremity (Therapeutic Exercise): AROM (active range of motion) (04/14/18 1030 : Rafaela S Kvamme, PT)  Expected Outcomes, Standing Lower Extremity (Therapeutic Exercise): improve functional tolerance, household activity, improve performance, gait skills,  improve performance, transfer skills (04/14/18 1030 : Rafaela Vu, PT)  Sets/Reps Detail, Standing Lower Extremity (Therapeutic Exercise): 1/10 (04/14/18 1030 : Rafaela Vu, PT)         Orthotics/Residual Limb/Prosthetic Management              Outcome Summary                 PT Recommendation and Plan                         PT IRF GOALS     Row Name 04/12/18 0900             Bed Mobility Goal 1 (PT-IRF)    Activity/Assistive Device (Bed Mobility Goal 1, PT-IRF) bed mobility activities, all  -EE      [place] Level (Bed Mobility Goal 1, PT-IRF) independent  -EE      Time Frame (Bed Mobility Goal 1, PT-IRF) long term goal (LTG);2 weeks  -EE      Progress/Outcomes (Bed Mobility Goal 1, PT-IRF) goal ongoing  -EE         Transfer Goal 1 (PT-IRF)    Activity/Assistive Device (Transfer Goal 1, PT-IRF) sit-to-stand/stand-to-sit;bed-to-chair/chair-to-bed   with A.A.D.  -EE      [place] Level (Transfer Goal 1, PT-IRF) conditional independence  -EE      Time Frame (Transfer Goal 1, PT-IRF) long term goal (LTG);2 weeks  -EE      Progress/Outcomes (Transfer Goal 1, PT-IRF) goal ongoing  -EE         Transfer Goal 2 (PT-IRF)    Activity/Assistive Device (Transfer Goal 2, PT-IRF) car transfer  -EE      [place] Level (Transfer Goal 2, PT-IRF) supervision required  -EE      Time Frame (Transfer Goal 2, PT-IRF) long term goal (LTG);2 weeks  -EE      Progress/Outcomes (Transfer Goal 2, PT-IRF) goal ongoing  -EE         Gait/Walking Locomotion Goal 1 (PT-IRF)    Activity/Assistive Device (Gait/Walking Locomotion Goal 1, PT-IRF) gait (walking locomotion)   with A.A.D.  -EE      Gait/Walking Locomotion Distance Goal 1 (PT-IRF) 160  -EE      [place] Level (Gait/Walking Locomotion Goal 1, PT-IRF) supervision required  -EE      Time Frame (Gait/Walking Locomotion Goal 1, PT-IRF) long term goal (LTG);2 weeks  -EE      Progress/Outcomes (Gait/Walking Locomotion Goal 1, PT-IRF) goal ongoing  -EE         Stairs Goal 1  (PT-IRF)    Activity/Assistive Device (Stairs Goal 1, PT-IRF) ascending stairs;descending stairs;using handrail, left;using handrail, right  -EE      Number of Stairs (Stairs Goal 1, PT-IRF) 12  -EE      [place] Level (Stairs Goal 1, PT-IRF) supervision required  -EE      Time Frame (Stairs Goal 1, PT-IRF) long term goal (LTG);2 weeks  -EE      Progress/Outcomes (Stairs Goal 1, PT-IRF) goal ongoing  -EE        User Key  (r) = Recorded By, (t) = Taken By, (c) = Cosigned By    Initials Name Provider Type    EE Enma Bernstein, PT Physical Therapist                   Time Calculation:           PT Charges     Row Name 04/14/18 1355             Time Calculation    Start Time 1000  -JK      Stop Time 1100  -JK      Time Calculation (min) 60 min  -JK        User Key  (r) = Recorded By, (t) = Taken By, (c) = Cosigned By    Initials Name Provider Type    REBECA Vu, PT Physical Therapist            Therapy Charges for Today     Code Description Service Date Service Provider Modifiers Qty    [id number]  PT THER PROC EA 15 MIN 4/14/2018 Rafaela Vu PT GP 4                   Rafaela Vu PT  4/14/2018

## 2018-04-15 LAB
GLUCOSE BLDC GLUCOMTR-MCNC: 101 MG/DL (ref 70–130)
GLUCOSE BLDC GLUCOMTR-MCNC: 102 MG/DL (ref 70–130)
GLUCOSE BLDC GLUCOMTR-MCNC: 110 MG/DL (ref 70–130)
GLUCOSE BLDC GLUCOMTR-MCNC: 165 MG/DL (ref 70–130)

## 2018-04-15 PROCEDURE — 25010000002 CEFTRIAXONE PER 250 MG: Performed by: PHYSICAL MEDICINE & REHABILITATION

## 2018-04-15 PROCEDURE — 82962 GLUCOSE BLOOD TEST: CPT

## 2018-04-15 RX ADMIN — HYDRALAZINE HYDROCHLORIDE 25 MG: 25 TABLET ORAL at 06:36

## 2018-04-15 RX ADMIN — SODIUM CHLORIDE 2 G: 900 INJECTION INTRAVENOUS at 23:28

## 2018-04-15 RX ADMIN — FUROSEMIDE 20 MG: 20 TABLET ORAL at 09:53

## 2018-04-15 RX ADMIN — LEVETIRACETAM 1000 MG: 500 TABLET, FILM COATED ORAL at 21:52

## 2018-04-15 RX ADMIN — VITAMIN D, TAB 1000IU (100/BT) 1000 UNITS: 25 TAB at 09:53

## 2018-04-15 RX ADMIN — CLOPIDOGREL 75 MG: 75 TABLET, FILM COATED ORAL at 09:54

## 2018-04-15 RX ADMIN — PANTOPRAZOLE SODIUM 40 MG: 40 TABLET, DELAYED RELEASE ORAL at 06:41

## 2018-04-15 RX ADMIN — PYRIDOXINE HCL TAB 50 MG 50 MG: 50 TAB at 09:54

## 2018-04-15 RX ADMIN — CETIRIZINE HYDROCHLORIDE 5 MG: 10 TABLET, FILM COATED ORAL at 09:55

## 2018-04-15 RX ADMIN — Medication 1 TABLET: at 09:53

## 2018-04-15 RX ADMIN — HYDRALAZINE HYDROCHLORIDE 25 MG: 25 TABLET ORAL at 14:26

## 2018-04-15 RX ADMIN — METRONIDAZOLE 500 MG: 500 TABLET, FILM COATED ORAL at 06:36

## 2018-04-15 RX ADMIN — LACOSAMIDE 100 MG: 100 TABLET, FILM COATED ORAL at 12:12

## 2018-04-15 RX ADMIN — METOPROLOL SUCCINATE 25 MG: 25 TABLET, FILM COATED, EXTENDED RELEASE ORAL at 09:53

## 2018-04-15 RX ADMIN — DEXAMETHASONE 1 MG: 0.5 TABLET ORAL at 09:52

## 2018-04-15 RX ADMIN — ASPIRIN 325 MG: 325 TABLET ORAL at 09:53

## 2018-04-15 RX ADMIN — MONTELUKAST 10 MG: 10 TABLET, FILM COATED ORAL at 09:54

## 2018-04-15 RX ADMIN — LACOSAMIDE 100 MG: 100 TABLET, FILM COATED ORAL at 21:52

## 2018-04-15 RX ADMIN — LEVETIRACETAM 1000 MG: 500 TABLET, FILM COATED ORAL at 09:53

## 2018-04-15 RX ADMIN — METRONIDAZOLE 500 MG: 500 TABLET, FILM COATED ORAL at 14:26

## 2018-04-15 RX ADMIN — METRONIDAZOLE 500 MG: 500 TABLET, FILM COATED ORAL at 21:52

## 2018-04-15 RX ADMIN — ESCITALOPRAM 20 MG: 20 TABLET, FILM COATED ORAL at 09:52

## 2018-04-15 RX ADMIN — FOLIC ACID 1 MG: 1 TABLET ORAL at 09:54

## 2018-04-15 RX ADMIN — OXYCODONE HYDROCHLORIDE AND ACETAMINOPHEN 500 MG: 500 TABLET ORAL at 09:53

## 2018-04-15 NOTE — PROGRESS NOTES
Inpatient Rehabilitation Functional Measures Assessment    Functional Measures  BERONICA Eating:  [facility] Grooming: [facility] Bathing:  [facility] Upper Body Dressing:  [facility] Lower Body Dressing:  [facility] Toileting:  [facility] Bladder Management  Level of Assistance:  [place]  Frequency/Number of Accidents this Shift:  [facility] Bowel Management  Level of Assistance: [place]  Frequency/Number of Accidents this Shift: [facility] Bed/Chair/Wheelchair Transfer:  [facility] Toilet Transfer:  [facility] Tub/Shower Transfer:  [place]    Previously Documented Mode of Locomotion at Discharge: Field  BERONICA Expected Mode of Locomotion at Discharge: [facility] Walk/Wheelchair:  [facility] Stairs:  [facility] Comprehension:  Auditory comprehension is the usual mode. Comprehension  Score = 6, Modified [place].  Patient comprehends complex/abstract  information in their primary language, requiring:  BERONICA Expression:  Vocal expression is the usual mode. Expression Score = 6,  Modified Independent.  Patient expresses complex/abstract information in their  primary language, requiring:  [facility] Social Interaction:  Activity was not observed.  BERONICA Problem Solving:  Activity was not observed.  BERONICA Memory:  Memory Score = 6, Modified [place].  Patient is modified  independent for memory, requiring:    Therapy Mode Minutes  Occupational Therapy: Branch  Physical Therapy: Branch  Speech Language Pathology:  [place]anch    Signed by: Laura Carter RN

## 2018-04-15 NOTE — PROGRESS NOTES
Inpatient Rehabilitation Functional Measures Assessment    Functional Measures  BERONICA Eating:  Branch  [facility] Grooming: [place]  [facility] Bathing:  Branch  [facility] Upper Body Dressing:  Branch  [facility] Lower Body Dressing:  [place]anch  [facility] Toileting:  Patient requires moderate assistance for adjusting clothing  before using a toilet, commode, bedpan, or urinal. Patient requires no physical  assistance for hygiene. Patient requires maximal assistance for adjusting  clothing after using a toilet, commode, bedpan, or urinal. Patient performs  33.33 % of toileting tasks. Toileting Score = 2. Maximal Assistance. Patient  requires the following assistive device(s): Walker .    BERONICA Bladder Management  Level of Assistance:  Bladder Score = 1. Patient performs less than 25% of tasks  and requires total assistance for bladder management. [place] provides total  assist to completely apply and remove brief.  Frequency/Number of Accidents this Shift:  Bladder accidents this shift:  1 .    [facility] Bowel Management  Level of Assistance: Activity was not observed.  Frequency/Number of Accidents this Shift: [place]    [facility] Bed/Chair/Wheelchair Transfer:  Bed/chair/wheelchair Transfer Score = 3.  Patient performs 50-74% of effort and requires moderate assistance (some  lifting)  for transferring to and from the bed/chair/wheelchair, including  assist lifting both legs. Patient requires the following assistive device(s):  Bed rails. Grab bars.  BERONICA Toilet Transfer:  Branch  [facility] Tub/Shower Transfer:  [place]    Previously Documented Mode of Locomotion at Discharge: Field  BERONICA Expected Mode of Locomotion at Discharge: Branch  [facility] Walk/Wheelchair:  Branch  [facility] Stairs:  Branch    [facility] Comprehension:  Branch  [facility] Expression:  Branch  [facility] Social Interaction:  Branch  [facility] Problem Solving:  [facility] Memory:  [place]    Therapy Mode Minutes  Occupational Therapy: Branch  Physical Therapy: Branch  Speech Language Pathology:  Branch    Signed by: RUTH Estevez

## 2018-04-15 NOTE — PLAN OF CARE
Problem: Patient Care Overview  Goal: Plan of Care Review  Outcome: Ongoing (interventions implemented as appropriate)   04/15/18 1000 04/15/18 1214   Coping/Psychosocial   Plan of Care Reviewed With patient --    OTHER   Outcome Summary --  Pt has wound vac toright groin and is transfering with one.     Goal: Individualization and Mutuality  Outcome: Ongoing (interventions implemented as appropriate)    Goal: Discharge Needs Assessment  Outcome: Ongoing (interventions implemented as appropriate)    Goal: Home Safety Plan  Outcome: Unable to achieve outcome(s) by discharge Date Met: 04/15/18    Goal: Coping Plan  Outcome: Ongoing (interventions implemented as appropriate)    Goal: Community Reintegration Plan  Outcome: Ongoing (interventions implemented as appropriate)      Problem: Skin Injury Risk (Adult)  Goal: Skin Health and Integrity  Outcome: Ongoing (interventions implemented as appropriate)      Problem: Fall Risk (Adult)  Goal: Absence of Fall  Outcome: Ongoing (interventions implemented as appropriate)      Problem: Mobility, Physical Impaired (Adult)  Goal: Enhanced Mobility Skills  Outcome: Ongoing (interventions implemented as appropriate)    Goal: Enhanced Functional Ability  Outcome: Ongoing (interventions implemented as appropriate)

## 2018-04-15 NOTE — PROGRESS NOTES
Inpatient Rehabilitation Plan of Care Note    Plan of Care  Care Plan Reviewed - No updates at this time.    Safety    Performed Intervention(s)  Safety rounds; call light/items within easy reach  Bed Alarm      Body Function Structure    Performed Intervention(s)  Skin assessment every shift  Dressing changes/Wound Care as order  Turning      Sphincter Control    Performed Intervention(s)  Monitor I&O  Timed voids  Use incontinent products      Psychosocial    Performed Intervention(s)  Encourage to verbalize concerns  Offer diversional activities    Signed by: Laura Carter RN

## 2018-04-15 NOTE — PROGRESS NOTES
LOS: 4 days   Patient Care Team:  Stanislaw Esposito MD as PCP - General    Chief Complaint: same    Subjective     History of Present Illness    Subjective Pt is awake and alert. Vac dressing discomfort resolved with lowering vac pressure to 50.     History taken from: patient    Objective     Vital Signs  Temp:  [98 °F (36.7 °C)-98.6 °F (37 °C)] 98.5 °F (36.9 °C)  Heart Rate:  [73-86] 86  Resp:  [18] 18  BP: (130-160)/(63-71) 160/71    Objective exam unchanged. Calves soft and NT. resp unlabored and regular    Results Review:     I reviewed the patient's new clinical results.    Medication Review:     Assessment/Plan     Active Problems:    Subarachnoid hemorrhage      Assessment & Plan Continue to prepare for dc.     Joseluis Mijares MD  04/15/18  10:39 AM    Time:

## 2018-04-15 NOTE — PROGRESS NOTES
Inpatient Rehabilitation Functional Measures Assessment    Functional Measures  BERONICA Eating:  [facility] Grooming: [facility] Bathing:  [facility] Upper Body Dressing:  [facility] Lower Body Dressing:  [facility] Toileting:  [facility] Bladder Management  Level of Assistance:  [place]  Frequency/Number of Accidents this Shift:  [facility] Bowel Management  Level of Assistance: [place]  Frequency/Number of Accidents this Shift: [facility] Bed/Chair/Wheelchair Transfer:  [facility] Toilet Transfer:  [facility] Tub/Shower Transfer:  [place]    Previously Documented Mode of Locomotion at Discharge: Field  BERONICA Expected Mode of Locomotion at Discharge: [facility] Walk/Wheelchair:  [facility] Stairs:  [facility] Comprehension:  Auditory comprehension is the usual mode. Comprehension  Score = 6, Modified [place].  Patient comprehends complex/abstract  information in their primary language, requiring: Additional time.  BERONICA Expression:  Vocal expression is the usual mode. Expression Score = 6,  Modified Independent.  Patient expresses complex/abstract information in their  primary language, requiring: Additional time.  BERONICA Social Interaction:  Social Interaction Score = 7, Independent. Patient is  completely independent for social interaction.  There are no activity  limitations.  BERONICA Problem Solving:  Problem Solving Score = 7, Independent.  Patient makes  appropriate decisions in order to solve complex problems.  Patient is completely  independent for problem solving.  There are no activity limitations.  BERONICA Memory:  Memory Score = 6, Modified [place].  Patient is modified  independent for memory, requiring: Requires additional time.    Therapy Mode Minutes  Occupational Therapy: Branch  Physical Therapy: Branch  Speech Language Pathology:  Branch    Signed by: Hayden Plascencia RN

## 2018-04-15 NOTE — PLAN OF CARE
Problem: Patient Care Overview  Goal: Plan of Care Review  Outcome: Ongoing (interventions implemented as appropriate)   04/15/18 0223   Patient Care Overview   IRF Plan of Care Review progress ongoing, continue   Progress, Functional Goals demonstrating adequate progress   Coping/Psychosocial   Plan of Care Reviewed With patient   OTHER   Outcome Summary Pt. is calm and cooperative. No complained of any pain. Spouse at bedside in the evening. IV medication applied. Meds with water. Wound VAC to right groin. Pressure is 50mmHg and continued. No new issues to note at this time.     Goal: Coping Plan  Outcome: Ongoing (interventions implemented as appropriate)   04/15/18 0223   Coping Plan   Demonstration of Effective Coping Strategies demonstrating adequate progress       Problem: Skin Injury Risk (Adult)  Goal: Skin Health and Integrity  Outcome: Ongoing (interventions implemented as appropriate)   04/15/18 0223   Skin Injury Risk (Adult)   Skin Health and Integrity making progress toward outcome       Problem: Fall Risk (Adult)  Goal: Absence of Fall  Outcome: Ongoing (interventions implemented as appropriate)   04/15/18 0223   Fall Risk (Adult)   Absence of Fall making progress toward outcome

## 2018-04-15 NOTE — PROGRESS NOTES
Inpatient Rehabilitation Functional Measures Assessment    Functional Measures  BERONICA Eating:  Eating Score = 5. Patient is supervision/set-up for eating,  requiring: No assistive devices were required.  BERONICA Grooming: Grooming Score = 5. Patient is supervision/set-up for grooming,  requiring: No assistive devices were required.  BERONICA Bathing:  Patient bathed in shower. Bathing Score = 4.  Patient requires  minimal assistance for bathing, requiring steadying for balance only. Patient  requires the following assistive device(s): Grab bar/arm rest to maintain  balance. Hand held shower.  BERONICA Upper Body Dressing:  Upper Body Dressing Score = 7 Patient is completely  independent for upper body dressing. There are no activity limitations.  BERONICA Lower Body Dressing:  Lower Body Dressing Score = 7. Patient is completely  independent for lower body dressing. There are no activity limitations.  BERONICA Toileting:  Toileting Score = 4.  Patient requires minimal assistance for  toileting, such as steadying for balance while cleansing or adjusting clothes.  Patient requires the following assistive device(s): Grab bar.    BERONICA Bladder Management  Level of Assistance:  Bladder Score = 7.  Patient is completely independent for  bladder management. There are no activity limitations.  Frequency/Number of Accidents this Shift:  Bladder accidents this shift:  0 .  Patient has not had an accident this shift.    BERONICA Bowel Management  Level of Assistance: Bowel Score = 7.  Patient is completely independent for  bowel management. There are no activity limitations.  Frequency/Number of Accidents this Shift: Bowel accidents this shift: 0 .  Patient has not had an accident this shift.    BERONICA Bed/Chair/Wheelchair Transfer:  Bed/chair/wheelchair Transfer Score = 4.  Patient performs 75% or more of effort and minimal assistance (little/incidental  help/lifting of one limb/steadying) for transferring to and from the  bed/chair/wheelchair, requiring:  [place]. Patient requires the following  assistive device(s): Walker. Grab bars. Arm rest.  BERONICA Toilet Transfer:  Toilet Transfer Score = 4.  Patient performs 75% or more  of effort and minimal assistance (little/incidental help/steadying) for  transferring to and from the toilet/commode, requiring: [place]. Patient  requires the following assistive device(s): Grab bars.  BERONICA Tub/Shower Transfer:  Shower Transfer Score = 4. Patient performs 75% or  more of effort and minimal assistance (little/incidental help/lifting of one  limb/steadying) for transferring to and from the shower, requiring: [place].  Patient requires the following assistive device(s): Shower chair. Grab bars.    Previously Documented Mode of Locomotion at Discharge: Field  BERONICA Expected Mode of Locomotion at Discharge: Branch  BERONICA Walk/Wheelchair:  Branch  BERONICA Stairs:  Branch    BERONICA Comprehension:  Branch  BERONICA Expression:  Branch  BERONICA Social Interaction:  Branch  BERONICA Problem Solving:  Branch  BERONICA Memory:  Branch    Therapy Mode Minutes  Occupational Therapy: Branch  Physical Therapy: Branch  Speech Language Pathology:  Branch    Signed by: RUTH Mcintosh

## 2018-04-15 NOTE — PROGRESS NOTES
Inpatient Rehabilitation Plan of Care Note    Plan of Care  Care Plan Reviewed - No updates at this time.    Sphincter Control    Performed Intervention(s)  Timed voids  Use incontinent products    Signed by: Hayden Plascencia RN

## 2018-04-16 LAB
ALBUMIN SERPL-MCNC: 2.9 G/DL (ref 3.5–5.2)
ALBUMIN/GLOB SERPL: 1.4 G/DL
ALP SERPL-CCNC: 51 U/L (ref 39–117)
ALT SERPL W P-5'-P-CCNC: 19 U/L (ref 1–33)
ANION GAP SERPL CALCULATED.3IONS-SCNC: 12.7 MMOL/L
AST SERPL-CCNC: 33 U/L (ref 1–32)
BASOPHILS # BLD AUTO: 0.04 10*3/MM3 (ref 0–0.2)
BASOPHILS NFR BLD AUTO: 0.7 % (ref 0–1.5)
BILIRUB SERPL-MCNC: 0.8 MG/DL (ref 0.1–1.2)
BUN BLD-MCNC: 20 MG/DL (ref 8–23)
BUN/CREAT SERPL: 22.5 (ref 7–25)
CALCIUM SPEC-SCNC: 8.6 MG/DL (ref 8.6–10.5)
CHLORIDE SERPL-SCNC: 102 MMOL/L (ref 98–107)
CO2 SERPL-SCNC: 22.3 MMOL/L (ref 22–29)
CREAT BLD-MCNC: 0.89 MG/DL (ref 0.57–1)
CRP SERPL-MCNC: 5.77 MG/DL (ref 0–0.5)
DEPRECATED RDW RBC AUTO: 53.2 FL (ref 37–54)
EOSINOPHIL # BLD AUTO: 0.14 10*3/MM3 (ref 0–0.7)
EOSINOPHIL NFR BLD AUTO: 2.5 % (ref 0.3–6.2)
ERYTHROCYTE [DISTWIDTH] IN BLOOD BY AUTOMATED COUNT: 15.8 % (ref 11.7–13)
ERYTHROCYTE [SEDIMENTATION RATE] IN BLOOD: 36 MM/HR (ref 0–30)
GFR SERPL CREATININE-BSD FRML MDRD: 62 ML/MIN/1.73
GLOBULIN UR ELPH-MCNC: 2.1 GM/DL
GLUCOSE BLD-MCNC: 89 MG/DL (ref 65–99)
GLUCOSE BLDC GLUCOMTR-MCNC: 104 MG/DL (ref 70–130)
GLUCOSE BLDC GLUCOMTR-MCNC: 131 MG/DL (ref 70–130)
GLUCOSE BLDC GLUCOMTR-MCNC: 145 MG/DL (ref 70–130)
GLUCOSE BLDC GLUCOMTR-MCNC: 99 MG/DL (ref 70–130)
HCT VFR BLD AUTO: 29.3 % (ref 35.6–45.5)
HGB BLD-MCNC: 9.3 G/DL (ref 11.9–15.5)
IMM GRANULOCYTES # BLD: 0.13 10*3/MM3 (ref 0–0.03)
IMM GRANULOCYTES NFR BLD: 2.4 % (ref 0–0.5)
LYMPHOCYTES # BLD AUTO: 1.23 10*3/MM3 (ref 0.9–4.8)
LYMPHOCYTES NFR BLD AUTO: 22.4 % (ref 19.6–45.3)
MCH RBC QN AUTO: 29.3 PG (ref 26.9–32)
MCHC RBC AUTO-ENTMCNC: 31.7 G/DL (ref 32.4–36.3)
MCV RBC AUTO: 92.4 FL (ref 80.5–98.2)
MONOCYTES # BLD AUTO: 0.42 10*3/MM3 (ref 0.2–1.2)
MONOCYTES NFR BLD AUTO: 7.6 % (ref 5–12)
NEUTROPHILS # BLD AUTO: 3.54 10*3/MM3 (ref 1.9–8.1)
NEUTROPHILS NFR BLD AUTO: 64.4 % (ref 42.7–76)
PLATELET # BLD AUTO: 179 10*3/MM3 (ref 140–500)
PMV BLD AUTO: 9 FL (ref 6–12)
POTASSIUM BLD-SCNC: 3.7 MMOL/L (ref 3.5–5.2)
PROT SERPL-MCNC: 5 G/DL (ref 6–8.5)
RBC # BLD AUTO: 3.17 10*6/MM3 (ref 3.9–5.2)
SODIUM BLD-SCNC: 137 MMOL/L (ref 136–145)
WBC NRBC COR # BLD: 5.5 10*3/MM3 (ref 4.5–10.7)

## 2018-04-16 PROCEDURE — 25010000002 CEFTRIAXONE PER 250 MG: Performed by: PHYSICAL MEDICINE & REHABILITATION

## 2018-04-16 PROCEDURE — 97112 NEUROMUSCULAR REEDUCATION: CPT

## 2018-04-16 PROCEDURE — G0515 COGNITIVE SKILLS DEVELOPMENT: HCPCS

## 2018-04-16 PROCEDURE — 86140 C-REACTIVE PROTEIN: CPT | Performed by: PHYSICAL MEDICINE & REHABILITATION

## 2018-04-16 PROCEDURE — 82962 GLUCOSE BLOOD TEST: CPT

## 2018-04-16 PROCEDURE — 97110 THERAPEUTIC EXERCISES: CPT

## 2018-04-16 PROCEDURE — 80053 COMPREHEN METABOLIC PANEL: CPT | Performed by: PHYSICAL MEDICINE & REHABILITATION

## 2018-04-16 PROCEDURE — 85025 COMPLETE CBC W/AUTO DIFF WBC: CPT | Performed by: PHYSICAL MEDICINE & REHABILITATION

## 2018-04-16 PROCEDURE — 85652 RBC SED RATE AUTOMATED: CPT | Performed by: PHYSICAL MEDICINE & REHABILITATION

## 2018-04-16 PROCEDURE — 97535 SELF CARE MNGMENT TRAINING: CPT

## 2018-04-16 RX ORDER — METOPROLOL SUCCINATE 50 MG/1
50 TABLET, EXTENDED RELEASE ORAL
Status: DISCONTINUED | OUTPATIENT
Start: 2018-04-17 | End: 2018-04-18

## 2018-04-16 RX ORDER — METOPROLOL SUCCINATE 25 MG/1
25 TABLET, EXTENDED RELEASE ORAL ONCE
Status: COMPLETED | OUTPATIENT
Start: 2018-04-16 | End: 2018-04-16

## 2018-04-16 RX ADMIN — Medication 1 TABLET: at 08:27

## 2018-04-16 RX ADMIN — HYDRALAZINE HYDROCHLORIDE 25 MG: 25 TABLET ORAL at 06:52

## 2018-04-16 RX ADMIN — VITAMIN D, TAB 1000IU (100/BT) 1000 UNITS: 25 TAB at 08:27

## 2018-04-16 RX ADMIN — PANTOPRAZOLE SODIUM 40 MG: 40 TABLET, DELAYED RELEASE ORAL at 06:52

## 2018-04-16 RX ADMIN — ESCITALOPRAM 20 MG: 20 TABLET, FILM COATED ORAL at 08:27

## 2018-04-16 RX ADMIN — FOLIC ACID 1 MG: 1 TABLET ORAL at 08:27

## 2018-04-16 RX ADMIN — LACOSAMIDE 100 MG: 100 TABLET, FILM COATED ORAL at 21:44

## 2018-04-16 RX ADMIN — METOPROLOL SUCCINATE 25 MG: 25 TABLET, FILM COATED, EXTENDED RELEASE ORAL at 08:26

## 2018-04-16 RX ADMIN — METRONIDAZOLE 500 MG: 500 TABLET, FILM COATED ORAL at 21:44

## 2018-04-16 RX ADMIN — PYRIDOXINE HCL TAB 50 MG 50 MG: 50 TAB at 08:26

## 2018-04-16 RX ADMIN — CLOPIDOGREL 75 MG: 75 TABLET, FILM COATED ORAL at 08:26

## 2018-04-16 RX ADMIN — CETIRIZINE HYDROCHLORIDE 5 MG: 10 TABLET, FILM COATED ORAL at 08:26

## 2018-04-16 RX ADMIN — METRONIDAZOLE 500 MG: 500 TABLET, FILM COATED ORAL at 06:52

## 2018-04-16 RX ADMIN — FUROSEMIDE 20 MG: 20 TABLET ORAL at 08:27

## 2018-04-16 RX ADMIN — LEVETIRACETAM 1000 MG: 500 TABLET, FILM COATED ORAL at 21:44

## 2018-04-16 RX ADMIN — OXYCODONE HYDROCHLORIDE AND ACETAMINOPHEN 500 MG: 500 TABLET ORAL at 08:27

## 2018-04-16 RX ADMIN — ASPIRIN 325 MG: 325 TABLET ORAL at 08:26

## 2018-04-16 RX ADMIN — SODIUM CHLORIDE 2 G: 900 INJECTION INTRAVENOUS at 23:16

## 2018-04-16 RX ADMIN — MONTELUKAST 10 MG: 10 TABLET, FILM COATED ORAL at 08:27

## 2018-04-16 RX ADMIN — LEVETIRACETAM 1000 MG: 500 TABLET, FILM COATED ORAL at 08:26

## 2018-04-16 RX ADMIN — METRONIDAZOLE 500 MG: 500 TABLET, FILM COATED ORAL at 14:38

## 2018-04-16 RX ADMIN — METOPROLOL SUCCINATE 25 MG: 25 TABLET, FILM COATED, EXTENDED RELEASE ORAL at 10:38

## 2018-04-16 RX ADMIN — HYDRALAZINE HYDROCHLORIDE 10 MG: 10 TABLET, FILM COATED ORAL at 01:05

## 2018-04-16 RX ADMIN — LACOSAMIDE 100 MG: 100 TABLET, FILM COATED ORAL at 08:26

## 2018-04-16 NOTE — PROGRESS NOTES
Inpatient Rehabilitation Functional Measures Assessment    Functional Measures  BERONICA Eating:  [facility] Grooming: [facility] Bathing:  [facility] Upper Body Dressing:  [facility] Lower Body Dressing:  [facility] Toileting:  [facility] Bladder Management  Level of Assistance:  [place]  Frequency/Number of Accidents this Shift:  [facility] Bowel Management  Level of Assistance: [place]  Frequency/Number of Accidents this Shift: [facility] Bed/Chair/Wheelchair Transfer:  [facility] Toilet Transfer:  [facility] Tub/Shower Transfer:  [place]    Previously Documented Mode of Locomotion at Discharge: Field  BERONICA Expected Mode of Locomotion at Discharge: [facility] Walk/Wheelchair:  [facility] Stairs:  [facility] Comprehension:  Auditory comprehension is the usual mode. Comprehension  Score = 6, Modified [place].  Patient comprehends complex/abstract  information in their primary language, requiring:  BERONICA Expression:  Vocal expression is the usual mode. Expression Score = 6,  Modified Independent.  Patient expresses complex/abstract information in their  primary language, requiring:  BERONICA Social Interaction:  Social Interaction Score = 6, Modified Independent.  Patient is modified independent for social interaction, requiring: Requires  additional time.  BERONICA Problem Solving:  Problem Solving Score = 6, Modified [place].  Patient  makes appropriate decisions in order to solve complex problems, but requires  extra time.  BERONICA Memory:  Memory Score = 6, Modified [place].  Patient is modified  independent for memory, requiring:    Therapy Mode Minutes  Occupational Therapy: [place]  Physical Therapy: [place]  Speech Language Pathology:  [place]    Signed by: Blaire Rapp RN

## 2018-04-16 NOTE — THERAPY TREATMENT NOTE
Inpatient Rehabilitation - Physical Therapy Treatment Note  [facility]     Patient Name: Herlinda Ta  : 1940  MRN: [id number]    Today's Date: 2018                 Admit Date: 2018      Visit Dx:      ICD-10-CM ICD-9-CM   1. 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   • Cerebral vasospasm   • Cervical pain (neck)   • Depressed   • Depression   • Dyspepsia   • Acid reflux   • Groin hematoma   • HTN, goal below 140/90   • HLD (hyperlipidemia)   • Infection of artery   • Iron deficiency anemia   • MRSA (methicillin resistant staph aureus) culture positive   • Rheumatoid arthritis   • Urinary incontinence, mixed   • Subarachnoid hemorrhage       Therapy Treatment    Evaluation/Coping    Evaluation/Treatment Time and Intent  Document Type: therapy note (daily note) (18 : Kera Cabrera PTA)  Mode of Treatment: physical therapy (18 : Kera Cabrera PTA)  Patient/Family Observations: up In w/c. Pleasant and cooperative. (18 : Kera Cabrera PTA)    Vitals/Pain/Safety    Pain Scale: Numbers Pre/Post-Treatment  Pain Scale: Numbers, Pretreatment: 0/10 - no pain (18 : Kera Cabrera PTA)  Positioning and Restraints  Post Treatment Position: wheelchair (18 : Kera Cabrera PTA)  In Wheelchair: sitting, call light within reach, exit alarm on (18 : Kera Cabrera PTA)    Cognition/Communication         Oral Motor/Eating         Mobility/Basic Activities/Instrumental Activities/Motor/Modality    Bed Mobility Assessment/Treatment  Rolling Left [place] (Bed Mobility): contact guard (18 : Kera Cabrera PTA)  Rolling Right [place] (Bed Mobility):  contact guard, minimum assist (75% patient effort), verbal cues (04/16/18 0908 : Kera Cabrera PTA)  Supine-Sit [place] (Bed Mobility): contact guard, verbal cues (04/16/18 0908 : Kera Cabrera PTA)  Sit-Supine [place] (Bed Mobility): contact guard (04/16/18 0908 : Kera Cabrera PTA)  Comment (Bed Mobility): assessed on txment mat (04/16/18 0908 : Kera Cabrera PTA)  Bed-Chair Transfer  Bed-Chair [place] (Transfers): minimum assist (75% patient effort), contact guard (04/16/18 0908 : Kera Cabrera PTA)  Chair-Bed Transfer  Chair-Bed [place] (Transfers): minimum assist (75% patient effort), contact guard (04/16/18 0908 : Kera Cabrera PTA)  Sit-Stand Transfer  Sit-Stand [place] (Transfers): contact guard, verbal cues (04/16/18 0908 : Kera Cabrera PTA)  Assistive Device (Sit-Stand Transfers): walker, front-wheeled (04/16/18 0908 : Kera Cabrera PTA)  Stand-Sit Transfer  Stand-Sit [place] (Transfers): contact guard, verbal cues (vc's to back up all the way to chair prior to sitting) (04/16/18 0908 : Kera Cabrera PTA)  Assistive Device (Stand-Sit Transfers): walker, front-wheeled (04/16/18 0908 : Kera Cabrera PTA)  Car Transfer  Type (Car Transfer): sit-stand, stand-sit (04/16/18 0908 : Kera Cabrera PTA)  [place] Level (Car Transfer): contact guard (04/16/18 0908 : Kera Cabrera PTA)  Assistive Device (Car Transfer): walker, front-wheeled (04/16/18 0908 : Kera Cabrera PTA)  Gait/Stairs Assessment/Training  [place] Level (Gait): contact guard, verbal cues (cues to look to the right to avoid objects on the right.) (04/16/18 0908 : Kera Cabrera PTA)  Assistive Device (Gait): walker, front-wheeled (04/16/18 0908 : Kera Cabrera, HUMA)  Distance in Feet (Gait): 260 (04/16/18 0908 : Kera Cabrera, HUMA)  [place] Level (Stairs): contact guard, verbal cues (04/16/18 0908 : Kera Cabrera,  HUMA)  Handrail Location (Stairs): right side (ascending) (04/16/18 0908 : Kera Cabrera PTA)  Number of Steps (Stairs): 8 (04/16/18 0908 : Kera Cabrera PTA)  Ascending Technique (Stairs): step-to-step (04/16/18 0908 : Kera Cabrera PTA)  Descending Technique (Stairs): step-to-step (04/16/18 0908 : Kera Cabrera PTA)  Comment (Gait/Stairs): curb, rwx,Min/CGA, vc's (04/16/18 0908 : Kera Cabrera PTA)              ROM/MMT                   Sensory/Myotome/Dermatome/Edema               Posture/Balance/Special Tests/Exercise/Transportation/Sexual Function    Dynamic Balance Activity  Therapeutic Training Performed (Dynamic Balance): side stepping (and sidestep w front crossover) (04/16/18 0908 : Kera Cabrera PTA)  Support Needed for Balance (Dynamic Balance Training): uses both upper extremities for support (04/16/18 0908 : Kera Cabrera PTA)  Comment (Dynamic Balance Training): CGA at hemibars (04/16/18 0908 : Kera Cabrera PTA)    Lower Extremity Standing Therapeutic Exercise  Performed, Standing Lower Extremity (Therapeutic Exercise): mini-squats, heel raises (04/16/18 0908 : Kera Cabrera PTA)  Exercise Type, Standing Lower Extremity (Therapeutic Exercise): AROM (active range of motion) (04/16/18 0908 : Kera Cabrera PTA)  Sets/Reps Detail, Standing Lower Extremity (Therapeutic Exercise): 1/10 (04/16/18 0908 : Kera Cabrera PTA)  Lower Extremity Supine Therapeutic Exercise  Performed, Supine Lower Extremity (Therapeutic Exercise): ankle dorsiflexion/plantarflexion, SAQ (short arc quad) over bolster (04/16/18 0908 : Kera Cabrera PTA)  Exercise Type, Supine Lower Extremity (Therapeutic Exercise): AROM (active range of motion) (04/16/18 0908 : Kera Cabrera PTA)  Sets/Reps Detail, Supine Lower Extremity (Therapeutic Exercise): 1/10 (04/16/18 0908 : Kera Cabrera PTA)         Orthotics/Residual Limb/Prosthetic Management              Outcome  Summary                 PT Recommendation and Plan                         PT IRF GOALS     Row Name 04/12/18 0900             Bed Mobility Goal 1 (PT-IRF)    Activity/Assistive Device (Bed Mobility Goal 1, PT-IRF) bed mobility activities, all  -EE      [place] Level (Bed Mobility Goal 1, PT-IRF) independent  -EE      Time Frame (Bed Mobility Goal 1, PT-IRF) long term goal (LTG);2 weeks  -EE      Progress/Outcomes (Bed Mobility Goal 1, PT-IRF) goal ongoing  -EE         Transfer Goal 1 (PT-IRF)    Activity/Assistive Device (Transfer Goal 1, PT-IRF) sit-to-stand/stand-to-sit;bed-to-chair/chair-to-bed   with A.A.D.  -EE      [place] Level (Transfer Goal 1, PT-IRF) conditional independence  -EE      Time Frame (Transfer Goal 1, PT-IRF) long term goal (LTG);2 weeks  -EE      Progress/Outcomes (Transfer Goal 1, PT-IRF) goal ongoing  -EE         Transfer Goal 2 (PT-IRF)    Activity/Assistive Device (Transfer Goal 2, PT-IRF) car transfer  -EE      [place] Level (Transfer Goal 2, PT-IRF) supervision required  -EE      Time Frame (Transfer Goal 2, PT-IRF) long term goal (LTG);2 weeks  -EE      Progress/Outcomes (Transfer Goal 2, PT-IRF) goal ongoing  -EE         Gait/Walking Locomotion Goal 1 (PT-IRF)    Activity/Assistive Device (Gait/Walking Locomotion Goal 1, PT-IRF) gait (walking locomotion)   with A.A.D.  -EE      Gait/Walking Locomotion Distance Goal 1 (PT-IRF) 160  -EE      [place] Level (Gait/Walking Locomotion Goal 1, PT-IRF) supervision required  -EE      Time Frame (Gait/Walking Locomotion Goal 1, PT-IRF) long term goal (LTG);2 weeks  -EE      Progress/Outcomes (Gait/Walking Locomotion Goal 1, PT-IRF) goal ongoing  -EE         Stairs Goal 1 (PT-IRF)    Activity/Assistive Device (Stairs Goal 1, PT-IRF) ascending stairs;descending stairs;using handrail, left;using handrail, right  -EE      Number of Stairs (Stairs Goal 1, PT-IRF) 12  -EE      [place] Level (Stairs Goal 1, PT-IRF) supervision  required  -EE      Time Frame (Stairs Goal 1, PT-IRF) long term goal (LTG);2 weeks  -EE      Progress/Outcomes (Stairs Goal 1, PT-IRF) goal ongoing  -EE        User Key  (r) = Recorded By, (t) = Taken By, (c) = Cosigned By    Initials Name Provider Type    EE Enma Bernstein, PT Physical Therapist                   Time Calculation:           PT Charges     Row Name 04/16/18 1404 04/16/18 0908          Time Calculation    Start Time 1400  -LB 0900  -LB     Stop Time 1430  -LB 0930  -LB     Time Calculation (min) 30 min  -LB 30 min  -LB       User Key  (r) = Recorded By, (t) = Taken By, (c) = Cosigned By    Initials Name Provider Type    LB Kera Cabrera PTA Physical Therapy Assistant            Therapy Charges for Today     Code Description Service Date Service Provider Modifiers Qty    [id number]  PT THER PROC EA 15 MIN 4/16/2018 Kera Cabrera PTA GP 4                   Kera Cabrera PTA  4/16/2018

## 2018-04-16 NOTE — NURSING NOTE
04/16/18 1035   Wound Right groin   No Date first assessed or Time first assessed found.   Present On Admission : yes  Side: Right  Location: groin   Dressing Appearance intact   Base maroon/purple;moist;red/granulating   Periwound intact   Periwound Temperature warm   Periwound Skin Turgor soft   Edges open   Drainage Characteristics/Odor serosanguineous   Drainage Amount moderate   Care, Wound cleansed with;sterile normal saline;negative pressure wound therapy   Dressing Care, Wound dressing changed;foam   Periwound Care, Wound barrier film applied;dry periwound area maintained   NPWT (Negative Pressure Wound Therapy) R groin   No Placement Date or Time found.   Location: R groin   Therapy Setting continuous therapy   Dressing foam, black;foam, white;transparent dressing   Pressure Setting 50 mmHg   Sponges Inserted 4  (2 white, 2 black)   Sponges Removed (3)   Finger sweep complete Yes     CWOCN follow up. Changed NPWT. Suction was changed to 50mmHg over the weekend for patient comfort. Patient tolerated well.

## 2018-04-16 NOTE — THERAPY TREATMENT NOTE
Inpatient Rehabilitation - Speech Language Pathology Treatment Note    [facility]       Patient Name: Herlinda Ta  : 1940  MRN: [id number]    Today's Date: 2018           Admit Date: 2018      Visit Dx:        ICD-10-CM ICD-9-CM   1. 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   • Cerebral vasospasm   • Cervical pain (neck)   • Depressed   • Depression   • Dyspepsia   • Acid reflux   • Groin hematoma   • HTN, goal below 140/90   • HLD (hyperlipidemia)   • Infection of artery   • Iron deficiency anemia   • MRSA (methicillin resistant staph aureus) culture positive   • Rheumatoid arthritis   • Urinary incontinence, mixed   • Subarachnoid hemorrhage          Therapy Treatment    Evaluation/Coping    Evaluation/Treatment Time and Intent  Document Type: therapy note (daily note) (18 1500 : Katherine L Bruton)  Mode of Treatment: speech-language pathology, individual therapy (18 1500 : Katherine L Bruton)  Patient/Family Observations: Make up time from 1PM session. Pt in bed asleep able to be woken for therapy, maintained alertness with three verbal cues needed throughout session.  (18 1500 : Katherine L Bruton)  Start Time (Evaluation/Treatment): 1500 (18 1500 : Katherine L Bruton)  Stop Time (Evaluation/Treatment): 1515 (18 1500 : Katherine L Bruton)    Vitals/Pain/Safety    Pain Scale: Numbers Pre/Post-Treatment  Pain Scale: Numbers, Pretreatment: 0/10 - no pain (18 1500 : Katherine L Bruton)    Cognition/Communication         Oral Motor/Eating         Mobility/Basic Activities/Instrumental Activities/Motor/Modality                   ROM/MMT                   Sensory/Myotome/Dermatome/Edema                Posture/Balance/Special Tests/Exercise/Transportation/Sexual Function                   Orthotics/Residual Limb/Prosthetic Management              Outcome Summary         EDUCATION    The patient has been educated in the following areas:     Cognitive Impairment.    SLP Recommendation and Plan                                                                SLP GOALS     Row Name 04/16/18 1500 04/16/18 1300 04/16/18 1000       Memory Skills Goal 1 (SLP)    Improve Memory Skills Through Goal 1 (SLP)  -- recalling related word lists with an imposed delay  -KB  --    Progress (Memory Skills Goal 1, SLP)  -- 40%   MIN-MOD verbal cues needed consistently  -KB  --    Progress/Outcomes (Memory Skills Goal 1, SLP)  -- goal ongoing  -KB  --       Memory Skills Goal 2 (SLP)    Improve Memory Skills Through Goal 2 (SLP)  --  -- recall details from a word list   recall word from list by word placement  -KB    Progress (Memory Skills Goal 2, SLP)  --  -- 0%  -KB    Progress/Outcomes (Memory Skills Goal 2, SLP)  --  -- goal ongoing  -KB    Comment (Memory Skills Goal 2, SLP)  --  -- Pt not able to recall detail from a list despite cues, transitioned task into only recall of list of 4 words.   -KB       Memory Skills Goal (SLP)    Improve Memory Skills Through Goal (SLP) visual memory task   CT Slap [place]ain L2  -KB  -- Immediate recall of list of 4 words  -KB    Progress (Memory Skills Goal, SLP) 30%;independently (over 90% accuracy)  -KB  -- 30%;independently (over 90% accuracy)  -KB    Progress/Outcomes (Memory Skills Goal, SLP) goal ongoing  -KB  -- goal ongoing  -KB    Comment (Memory Skills Goal, SLP) Pt required cues for sustained attention and memory for stimuli. Dificulty maintaining alertness during this task, abandoned before completing d/t lack of alertness.   -KB  -- Pt required consistent repetitions and additional MAX cues to recall lists of 4 words. Some awareness of deficit noted, but pt did not self-correct.   -KB  "      Organizational Skills Goal (SLP)    Improve Thought Organization Through Goal (SLP)  -- sorting items into 4 concrete categories  -KB completing word matrix given category and initial letter  -KB    Time Frame (Thought Organization Skills Goal, SLP)  --  -- by discharge  -KB    Progress (Thought Organization Skills Goal, SLP)  -- 100%;independently (over 90% accuracy)  -KB 30%;independently (over 90% accuracy)   consistent MIN-MOD verbal cues required for word generation  -KB    Progress/Outcomes (Thought Organization Skills Goal, SLP)  -- goal ongoing  -KB goal ongoing  -KB    Comment (Thought Organization Skills Goal, SLP)  --  -- Provided similar task for pt to work on in her room.   -KB       Reasoning Goal 1 (SLP)    Improve Reasoning Through Goal 1 (SLP)  -- complete deductive reasoning task   category deduction given 4 items  -KB  --    Progress (Reasoning Goal 1, SLP)  -- 70%;independently (over 90% accuracy)   MIN verbal cue to correctly label \"office supplies\" category  -KB  --    Progress/Outcomes (Reasoning Goal 1, SLP)  -- good progress toward goal;goal ongoing  -KB  --    Comment (Reasoning Goal 1, SLP)  -- Pt named categories given items belonging in each.   -KB  --       Executive Functional Skills Goal 1 (SLP)    Improve Executive Function Skills Goal 1 (SLP) exhibit cognitive flexibility   providing two definitions for words  -KB  --  --    Progress (Executive Function Skills Goal 1, SLP) 60%;independently (over 90% accuracy);90%;with moderate cues (50-74%)  -KB  --  --    Progress/Outcomes (Executive Function Skills Goal 1, SLP) goal ongoing  -KB  --  --    Row Name 04/14/18 [facility]             Executive Functional Skills Goal 1 (SLP)    Improve Executive Function Skills Goal 1 (SLP) exhibit cognitive flexibility  -JJ      Progress (Executive Function Skills Goal 1, SLP) 80%;90%;with minimal cues (75-90%);other (comment)   Providing multiple definitions  -JJ        User Key  (r) = Recorded By, " (t) = Taken By, (c) = Cosigned By    Initials Name Provider Type    BHAVANI Varghese, MS CCC-SLP Speech and Language Pathologist    KB Katherine L Bruton Speech and Language Pathologist                    Time Calculation:             Time Calculation- SLP     Row Name 04/16/18 1515 04/16/18 1030          Time Calculation- SLP    SLP Start Time 1500  -KB 1000  -KB     SLP Stop Time 1515  -KB 1030  -KB     SLP Time Calculation (min) 15 min  -KB 30 min  -KB       User Key  (r) = Recorded By, (t) = Taken By, (c) = Cosigned By    Initials Name Provider Type    KB Katherine L Bruton Speech and Language Pathologist            Therapy Charges for Today     Code Description Service Date Service Provider Modifiers Qty    [id number]  ST DEV OF COGN SKILLS EACH 15 MIN 4/16/2018 Katherine L Bruton  4                           Katherine L Bruton  4/16/2018

## 2018-04-16 NOTE — PROGRESS NOTES
Inpatient Rehabilitation Functional Measures Assessment and Plan of Care    Plan of Care  Updated Problems/Interventions  Field    Functional Measures  BERONICA Eating:  Branch  BERONICA Grooming: Branch  [facility] Bathing:  Branch  [facility] Upper Body Dressing:  [facility] Lower Body Dressing:  [facility] Toileting:  [facility] Bladder Management  Level of Assistance:  [place]  Frequency/Number of Accidents this Shift:  [facility] Bowel Management  Level of Assistance: [place]  Frequency/Number of Accidents this Shift: Branch    [facility] Bed/Chair/Wheelchair Transfer:  Bed/chair/wheelchair Transfer Score = 4.  Patient performs 75% or more of effort and minimal assistance (little/incidental  help/lifting of one limb/steadying) for transferring to and from the  bed/chair/wheelchair, requiring: No assistive devices were required.  BERONICA Toilet Transfer:  [facility] Tub/Shower Transfer:  [place]    Previously Documented Mode of Locomotion at Discharge: Field  BERONICA Expected Mode of Locomotion at Discharge: [facility] Walk/Wheelchair:  WHEELCHAIR OBSERVATION   Activity was not observed.    WALK OBSERVATION   Walk Distance Scale = 3.  Distance walked is greater than 150 feet. Walk Score  = 4.  Patient performs 75% or more of effort and requires minimal assistance.  Incidental help/contact guard/steadying was provided. Patient walked a distance  of  260 feet. Patient requires the following assistive device(s): Rolling  walker.  BERONICA Stairs:  Stairs Score = 2.  Incidental assistance with lifting or lowering,  contact guard or steadying was provided. Patient performs 75% or more of effort  and requires minimal contact assistance. Patient negotiated  8 stairs. Patient  requires the following assistive device(s): Handrail(s).    BERONICA Comprehension:  Branch  [facility] Expression:  [facility] Social Interaction:  [facility] Problem Solving:  [facility] Memory:  [place]    Therapy Mode Minutes  Occupational Therapy: Branch  Physical Therapy: Individual: 60  minutes.  Speech Language Pathology:  Branch    Signed by: Kera Cabrera PTA

## 2018-04-16 NOTE — PLAN OF CARE
Problem: Patient Care Overview  Goal: Plan of Care Review  Outcome: Ongoing (interventions implemented as appropriate)   04/16/18 0137   Patient Care Overview   IRF Plan of Care Review progress ongoing, continue   Progress, Functional Goals demonstrating adequate progress   Coping/Psychosocial   Plan of Care Reviewed With patient   OTHER   Outcome Summary Patient was calm, cooperative, and pleasant. Wound vac to R groin, transfers assist x 1. Patient refused he Hydralazine tonight and he BP at approx 0100 was 180//76. Patient was given PRN Hydralazine 10 mg. BP will be rechecked at 0200. No c/o pain, no unsafe behaviors.        Problem: Skin Injury Risk (Adult)  Goal: Skin Health and Integrity  Outcome: Ongoing (interventions implemented as appropriate)   04/16/18 0137   Skin Injury Risk (Adult)   Skin Health and Integrity making progress toward outcome       Problem: Fall Risk (Adult)  Goal: Absence of Fall  Outcome: Ongoing (interventions implemented as appropriate)   04/16/18 0137   Fall Risk (Adult)   Absence of Fall making progress toward outcome       Problem: Mobility, Physical Impaired (Adult)  Goal: Enhanced Mobility Skills  Outcome: Ongoing (interventions implemented as appropriate)   04/16/18 0137   Mobility, Physical Impaired (Adult)   Enhanced Mobility Skills making progress toward outcome     Goal: Enhanced Functional Ability  Outcome: Ongoing (interventions implemented as appropriate)   04/16/18 0137   Mobility, Physical Impaired (Adult)   Enhanced Functional Ability making progress toward outcome

## 2018-04-16 NOTE — THERAPY TREATMENT NOTE
Inpatient Rehabilitation - Occupational Therapy Treatment Note    [facility]     Patient Name: Herlinda Ta  : 1940  MRN: [id number]    Today's Date: 2018                 Admit Date: 2018      Visit Dx:    ICD-10-CM ICD-9-CM   1. 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   • Cerebral vasospasm   • Cervical pain (neck)   • Depressed   • Depression   • Dyspepsia   • Acid reflux   • Groin hematoma   • HTN, goal below 140/90   • HLD (hyperlipidemia)   • Infection of artery   • Iron deficiency anemia   • MRSA (methicillin resistant staph aureus) culture positive   • Rheumatoid arthritis   • Urinary incontinence, mixed   • Subarachnoid hemorrhage         Therapy Treatment          IRF Treatment Summary     Row Name 18 1300 18 1000 18 0908       Evaluation/Treatment Time and Intent    Document Type therapy note (daily note)  -KB therapy note (daily note)  -KB therapy note (daily note)  -LB    Mode of Treatment speech-language pathology;individual therapy  -KB speech-language pathology;individual therapy  -KB physical therapy  -LB    Patient/Family Observations Pt asleep in bed, needed to go to the bathroom before session. Session began 15 minutes late.   -KB Pt agreeable to therapy, no complaints. Pt with difficulty processing, reasoning, word-finding this date.   -KB up In w/c. Pleasant and cooperative.  -LB    Start Time (Evaluation/Treatment) 1315  -KB 1000  -KB  --    Stop Time (Evaluation/Treatment) 1330  -KB 1030  -KB  --    Recorded by [KB] Katherine L Bruton [KB] Katherine L Bruton [LB] Kera Cabrera PTA    Row Name 18 0908             Bed Mobility Assessment/Treatment    Rolling Left [place] (Bed Mobility)  contact guard  -LB      Rolling Right [place] (Bed Mobility) contact guard;minimum assist (75% patient effort);verbal cues  -LB      Supine-Sit [place] (Bed Mobility) contact guard;verbal cues  -LB      Sit-Supine [place] (Bed Mobility) contact guard  -LB      Comment (Bed Mobility) assessed on txment mat  -LB      Recorded by [LB] Kera Cabrera PTA      Row Name 04/16/18 0908             Transfer Assessment/Treatment    Bed-Chair [place] (Transfers) minimum assist (75% patient effort);contact guard  -LB      Chair-Bed [place] (Transfers) minimum assist (75% patient effort);contact guard  -LB      Sit-Stand [place] (Transfers) contact guard;verbal cues  -LB      Stand-Sit [place] (Transfers) contact guard;verbal cues   vc's to back up all the way to chair prior to sitting  -LB      Recorded by [LB] Kera Cabrera [facility]      Row Name 04/16/18 0908             Sit-Stand Transfer    Assistive Device (Sit-Stand Transfers) walker, front-wheeled  -LB      Recorded by [LB] Kera Cabrera PTA      Row Name 04/16/18 0908             Stand-Sit Transfer    Assistive Device (Stand-Sit Transfers) walker, front-wheeled  -LB      Recorded by [LB] Kera Cabrera [facility]      Row Name 04/16/18 0908             Car Transfer    Type (Car Transfer) sit-stand;stand-sit  -LB      [place] Level (Car Transfer) contact guard  -LB      Assistive Device (Car Transfer) walker, front-wheeled  -LB      Recorded by [LB] Kera Cabrera [facility]      Row Name 04/16/18 0908             Gait/Stairs Assessment/Training    [place] Level (Gait) contact guard;verbal cues   cues to look to the right to avoid objects on the right.  -LB      Assistive Device (Gait) walker, front-wheeled  -LB      Distance in Feet (Gait) 260  -LB      [place] Level (Stairs) contact guard;verbal cues  -LB      Handrail Location (Stairs) right side (ascending)  -LB      Number of Steps (Stairs) 8  -LB      Ascending  Technique (Stairs) step-to-step  -LB      Descending Technique (Stairs) step-to-step  -LB      Comment (Gait/Stairs) curb, rwx,Min/CGA, vc's  -LB      Recorded by [LB] Kera Cabrera PTA      Row Name 04/16/18 1300 04/16/18 1000 04/16/18 0908       Pain Scale: Numbers Pre/Post-Treatment    Pain Scale: Numbers, Pretreatment 0/10 - no pain  -KB 0/10 - no pain  -KB 0/10 - no pain  -LB    Recorded by [KB] Katherine L Bruton [KB] Katherine L Bruton [LB] Kera Cabrera PTA    Row Name 04/16/18 0908             Dynamic Balance Activity    Therapeutic Training Performed (Dynamic Balance) side stepping   and sidestep w front crossover  -LB      Support Needed for Balance (Dynamic Balance Training) uses both upper extremities for support  -LB      Comment (Dynamic Balance Training) CGA at hemibars  -LB      Recorded by [LB] Kera Cabrera PTA      Row Name 04/16/18 0908             Lower Extremity Standing Therapeutic Exercise    Performed, Standing Lower Extremity (Therapeutic Exercise) mini-squats;heel raises  -LB      Exercise Type, Standing Lower Extremity (Therapeutic Exercise) AROM (active range of motion)  -LB      Sets/Reps Detail, Standing Lower Extremity (Therapeutic Exercise) 1/10  -LB      Recorded by [LB] Kera Cabrera PTA      Row Name 04/16/18 0908             Lower Extremity Supine Therapeutic Exercise    Performed, Supine Lower Extremity (Therapeutic Exercise) ankle dorsiflexion/plantarflexion;SAQ (short arc quad) over bolster  -LB      Exercise Type, Supine Lower Extremity (Therapeutic Exercise) AROM (active range of motion)  -LB      Sets/Reps Detail, Supine Lower Extremity (Therapeutic Exercise) 1/10  -LB      Recorded by [LB] Kera Cabrera PTA      Row Name 04/16/18 0908             Positioning and Restraints    Post Treatment Position wheelchair  -LB      In Wheelchair sitting;call light within reach;exit alarm on  -LB      Recorded by [LB] Kera Cabrera PTA        User Nazario   (r) = Recorded By, (t) = Taken By, (c) = Cosigned By    Initials Name Effective Dates    LB Kera MOON Rick, PTA 03/07/18 -     KB Blaire HOLBROOK Bruton 03/07/18 -           Wound Right groin (Active)   Dressing Appearance intact 4/16/2018 10:35 AM   Closure TULIO 4/15/2018  9:52 PM   Base maroon/purple;moist;red/granulating 4/16/2018 10:35 AM   Periwound intact 4/16/2018 10:35 AM   Periwound Temperature warm 4/16/2018 10:35 AM   Periwound Skin Turgor soft 4/16/2018 10:35 AM   Edges open 4/16/2018 10:35 AM   Drainage Characteristics/Odor serosanguineous 4/16/2018 10:35 AM   Drainage Amount moderate 4/16/2018 10:35 AM   Care, Wound cleansed with;sterile normal saline;negative pressure wound therapy 4/16/2018 10:35 AM   Dressing Care, Wound dressing changed;foam 4/16/2018 10:35 AM   Periwound Care, Wound barrier film applied;dry periwound area maintained 4/16/2018 10:35 AM       NPWT (Negative Pressure Wound Therapy) R groin (Active)   Therapy Setting continuous therapy 4/16/2018 10:35 AM   Dressing foam, black;foam, white;transparent dressing 4/16/2018 10:35 AM   Pressure Setting 50 mmHg 4/16/2018 10:35 AM   Sponges Inserted 4 4/16/2018 10:35 AM   Finger sweep complete Yes 4/16/2018 10:35 AM         OT Recommendation and Plan                       OT IRF GOALS     Row Name 04/12/18 1524             Transfer Goal 1 (OT-IRF)    Activity/Assistive Device (Transfer Goal 1, OT-IRF) toilet;shower chair;walk-in shower;tub  -SO      [place] Level (Transfer Goal 1, OT-IRF) supervision required;verbal cues required  -SO      Time Frame (Transfer Goal 1, OT-IRF) long term goal (LTG);2 weeks  -SO         LB Dressing Goal 1 (OT-IRF)    Activity/Device (LB Dressing Goal 1, OT-IRF) lower body dressing  -SO      [place] (LB Dressing Goal 1, OT-IRF) supervision required  -SO      Time Frame (LB Dressing Goal 1, OT-IRF) long term goal (LTG);2 weeks  -SO         Toileting Goal 1 (OT-IRF)    Activity/Device (Toileting Goal 1,  OT-IRF) toileting skills, all;grab bar/safety frame  -SO      [place] Level (Toileting Goal 1, OT-IRF) supervision required  -SO      Time Frame (Toileting Goal 1, OT-IRF) long term goal (LTG);2 weeks  -SO         Strength Goal 1 (OT-IRF)    Strength Goal 1 (OT-IRF) Pt to increase overall BUE strength to 4-/5 to assist with functional activities and ADLs.  -SO      Time Frame (Strength Goal 1, OT-IRF) long term goal (LTG);2 weeks  -SO         Caregiver Training Goal 1 (OT-IRF)    Caregiver Training Goal 1 (OT-IRF) Pt and caregiver to be independent with AE, HEP, home safety, etc. at d/c.  -SO      Time Frame (Caregiver Training Goal 1, OT-IRF) long term goal (LTG);2 weeks  -SO        User Key  (r) = Recorded By, (t) = Taken By, (c) = Cosigned By    Initials Name Provider Type    SO ANNIE Moscoso Occupational Therapist                 Time Calculation:           Time Calculation- OT     Row Name 04/16/18 1456 04/16/18 1454          Time Calculation- OT    OT Start Time 1330  -AF 0830  -AF     OT Stop Time 1400  -AF 0900  -AF     OT Time Calculation (min) 30 min  -AF 30 min  -AF       User Key  (r) = Recorded By, (t) = Taken By, (c) = Cosigned By    Initials Name Provider Type    AF ANNIE Espinoza Occupational Therapist             Therapy Charges for Today     Code Description Service Date Service Provider Modifiers Qty    20988259947 HC OT SELF CARE/MGMT/TRAIN EA 15 MIN 4/16/2018 ANNIE Espinoza GO 2    09103263943 HC OT NEUROMUSC RE EDUCATION EA 15 MIN 4/16/2018 ANNIE Espinoza GO 1    72861650757 HC OT THER PROC EA 15 MIN 4/16/2018 ANNIE Espnioza GO 1                   ANNIE Espinoza  4/16/2018

## 2018-04-16 NOTE — PROGRESS NOTES
Inpatient Rehabilitation Plan of Care Note    Plan of Care  Care Plan Reviewed - No updates at this time.    Body Function Structure    [RN] Skin Integrity(Active)  Current Status(04/16/2018): Right groin wound VAC; bruising all over  Weekly Goal(04/24/2018): No further skin breakdown  Discharge Goal: same as weekly    Performed Intervention(s)  Skin assessment every shift  Dressing changes/Wound Care as order  Turning      Psychosocial    [RN] Coping/Adjustment(Active)  Current Status(04/16/2018): Patient has a supportive family  and son;  appears to be coping well with current status  Weekly Goal(04/25/2018): Patient will cope adequately regarding current status  and demonstrate healthy coping strategies  Discharge Goal: Same as weekly    Performed Intervention(s)  Encourage to verbalize concerns  Offer diversional activities      Safety    [RN] Potential for Injury(Active)  Current Status(04/16/2018): Subarachnoid hemorrhage; Left leg weaker than right  Weekly Goal(04/24/2018): Will use call light for assistance; no falls  Discharge Goal: No falls    Performed Intervention(s)  Safety rounds; call light/items within easy reach  Bed Alarm      Sphincter Control    [RN] Bladder Management(Active)  Current Status(04/16/2018): Incontinent of bladder 100% per  report  Weekly Goal(04/25/2018): Continent 50%  Discharge Goal: Continent 100%    [RN] Bowel Management(Active)  Current Status(04/16/2018): Continent 100%  Weekly Goal(04/24/2018): Continent 100%  Discharge Goal: Continent 100%    Performed Intervention(s)  Monitor I&O  Timed voids  Use incontinent products    Signed by: Miya Newman RN

## 2018-04-16 NOTE — PROGRESS NOTES
Inpatient Rehabilitation Functional Measures Assessment    Functional Measures  BERONICA Eating:  Branch  BERONICA Grooming: Branch  [facility] Bathing:  Branch  [facility] Upper Body Dressing:  Branch  [facility] Lower Body Dressing:  [place]anch  [facility] Toileting:  Toileting Score = 4.  Patient requires minimal assistance for  toileting, such as steadying for balance while cleansing or adjusting clothes.  Patient requires the following assistive device(s): Grab bar.    BERONICA Bladder Management  Level of Assistance:  Bladder Score = 2. Patient performs 25-49% of tasks and  requires maximal assistance for bladder management. [place] provides most of the  assist to apply AND remove brief.  Frequency/Number of Accidents this Shift:  Bladder accidents this shift:  0 .  Patient has not had an accident this shift.    [facility] Bowel Management  Level of Assistance: [place]  Frequency/Number of Accidents this Shift: [place]    [facility] Bed/Chair/Wheelchair Transfer:  Bed/chair/wheelchair Transfer Score = 4.  Patient performs 75% or more of effort and minimal assistance (little/incidental  help/lifting of one limb/steadying) for transferring to and from the  bed/chair/wheelchair, requiring: Contact guard. Patient requires the following  assistive device(s): Bed rails. Walker.  BERONICA Toilet Transfer:  Toilet Transfer Score = 4.  Patient performs 75% or more  of effort and minimal assistance (little/incidental help/steadying) for  transferring to and from the toilet/commode, requiring: Contact guard. Patient  requires the following assistive device(s): Grab bars. Walker.  BERONICA Tub/Shower Transfer:  [place]    Previously Documented Mode of Locomotion at Discharge: Field  BERONICA Expected Mode of Locomotion at Discharge: [place]  [facility] Walk/Wheelchair:  [place]  [facility] Stairs:  [place]    [facility] Comprehension:  [place]  [facility] Expression:  Branch  [facility] Social Interaction:  [place]  [facility] Problem Solving:  [place]  [facility] Memory:  [place]    Therapy Mode Minutes  Occupational Therapy: [place]  Physical Therapy:  Branch  Speech Language Pathology:  [place]    Signed by: Maria De Jesus Candelario NA

## 2018-04-16 NOTE — PROGRESS NOTES
LOS: 5 days   Patient Care Team:  Stanislaw Esposito MD as PCP - General     Chief Complaint:   SAH, opthalmic artery aneurysm s/p endovascular embolization.   Right Groin hematoma, right groin wound infection s/p wound vac placement.   HTN        Subjective     History of Present Illness    Subjective  She denies significant headache.  Tolerates therapies.  Does have some soreness at the right groin where she has a wound, as expected.  Pressure on VAC decreased for patient comfort this weekend. Wound assessed by wound care team today. Wound base maroon/purple, moist, granulating    History taken from: patient    Objective     Vital Signs  Temp:  [98.1 °F (36.7 °C)-98.6 °F (37 °C)] 98.6 °F (37 °C)  Heart Rate:  [70-94] 94  Resp:  [16-18] 16  BP: (120-180)/(58-80) 147/65    Objective  MENTAL STATUS-awake, alert  HEENT -  LUNGS -CTA  HEART -RRR  ABD - NABS, soft, NT  EXT - VAC RIGHT GROIN  NEURO - takes resistance bilateral upper extremities and lower extremities      Results Review:     I reviewed the patient's new clinical results.    Results from last 7 days  Lab Units 04/16/18  0507 04/12/18  0905   WBC 10*3/mm3 5.50 5.23   HEMOGLOBIN g/dL 9.3* 9.3*   HEMATOCRIT % 29.3* 28.3*   PLATELETS 10*3/mm3 179 181         Results from last 7 days  Lab Units 04/16/18  0507 04/12/18  0905   SODIUM mmol/L 137 135*   POTASSIUM mmol/L 3.7 3.6   CHLORIDE mmol/L 102 100   CO2 mmol/L 22.3 20.5*   BUN mg/dL 20 20   CREATININE mg/dL 0.89 1.04*   CALCIUM mg/dL 8.6 8.4*   BILIRUBIN mg/dL 0.8 0.8   ALK PHOS U/L 51 60   ALT (SGPT) U/L 19 29   AST (SGOT) U/L 33* 20   GLUCOSE mg/dL 89 104*       Results from last 7 days  Lab Units 04/16/18  0507   CRP mg/dL 5.77*       Results from last 7 days  Lab Units 04/16/18  0507   SED RATE mm/hr 36*         Medication Review: DONE  Scheduled Meds:    aspirin 325 mg Oral Daily   ceftriaxone 2 g Intravenous Daily   cetirizine 5 mg Oral Daily   cholecalciferol 1,000 Units Oral Daily    clopidogrel 75 mg Oral Daily   escitalopram 20 mg Oral Daily   folic acid 1 mg Oral Daily   furosemide 20 mg Oral Daily   hydrALAZINE 25 mg Oral Q8H   lacosamide 100 mg Oral Q12H   levETIRAcetam 1,000 mg Oral BID   [START ON 4/17/2018] metoprolol succinate XL 50 mg Oral Q24H   metroNIDAZOLE 500 mg Oral Q8H   montelukast 10 mg Oral Daily   multivitamin 1 tablet Oral Daily   pantoprazole 40 mg Oral Q AM   vitamin B-6 50 mg Oral Daily   vitamin C 500 mg Oral Daily     Continuous Infusions:   PRN Meds:.•  albuterol  •  EPINEPHrine  •  hydrALAZINE  •  oxyCODONE-acetaminophen **OR** oxyCODONE-acetaminophen  •  triamcinolone      Assessment/Plan     Active Problems:    Subarachnoid hemorrhage      Assessment & Plan   SAH, opthalmic artery aneurysm s/p endovascular embolization.   Aspirin / Plavix  Decadron taper  GI prophylaxis - Protonix  Seizure - Vimpat / Keppra  HTN - SBP goal 120-180, s/p vasospams - Patient's SBP goal is 120-180 per Neurosurgery.  April 12 - SBP was averaging 140's and ranged 120's to 180's recently at [facility].  She was on Hydralazine 25 mg q  8 hours scheduled and Hydralazine 10 mg IV q 4 hours prn SBP >180 or DBP > 100 with last dose 4-11-18 at 07:35 there.  At discharge she was restarted on Lasix 20 mg daily, Metoprolol 100 mg daily and Doxazosin 2 mg HS , which she was not on there.  SBP was 168 last PM and 118 this AM.  Will continue Hydralazine scheduled and po prn and Lasix, but hold additional Metoprolol and Doxazosin for now to avoid excessive BP reduction s/p aneurysm stent and s/p vasospasm. She completed Nimotop April 11.   April 13 -  - resume Metoprolol succinate at lower dose of 25 mg daily  April 16 - BP elevated last PM SBP 180s - given hydralazine prn, increased Metoprolol to 50 mg daily; may titrate up on hydralazine, follow pattern.      Right Groin hematoma, right groin wound infection s/p wound vac placement.   ID - Rocephin / Metronidazole x 4 weeks - Clarified with Chris  [facility] - Ceftriazone started 4-7 and Metronidazole started 4-11 - will do stop date May 9.April 16 - weekly labs reviewed. Results to ID.     DVT - SCDs         TEAM CONF - April 13 - BALANCE AND PROPRIOCEPTIVE DEFICITS, SOMETIMES RUN INTO OBJECTS TO RIGHT, CORRECT WITH CUING. TRANSFERS CTG. GAIT 160 FEET RW CTG/MIN ASSIST. WOUND VAC RIGHT GROIN. BATH MIN. LBD MOD, UBD MIN ASSIST. COGNITION - SLOW PROCESSING AND MILD DIFFICULTY WITH EXECUTIVE FUNCTION. CONTINENT BOWEL AND BLADDER.  ELOS- ONE WEEK    Misha Prince MD  04/16/18  1:06 PM    Time:

## 2018-04-16 NOTE — PLAN OF CARE
Problem: Patient Care Overview  Goal: Plan of Care Review  Outcome: Ongoing (interventions implemented as appropriate)   04/16/18 7670   Patient Care Overview   IRF Plan of Care Review progress ongoing, continue   Progress, Functional Goals demonstrating adequate progress   Coping/Psychosocial   Plan of Care Reviewed With patient   OTHER   Outcome Summary No complaints of pain this shift. No unsafe behavior at this time       Problem: Skin Injury Risk (Adult)  Goal: Skin Health and Integrity  Outcome: Ongoing (interventions implemented as appropriate)      Problem: Fall Risk (Adult)  Goal: Absence of Fall  Outcome: Ongoing (interventions implemented as appropriate)      Problem: Mobility, Physical Impaired (Adult)  Goal: Enhanced Mobility Skills  Outcome: Ongoing (interventions implemented as appropriate)    Goal: Enhanced Functional Ability  Outcome: Ongoing (interventions implemented as appropriate)

## 2018-04-16 NOTE — PROGRESS NOTES
Inpatient Rehabilitation Functional Measures Assessment and Plan of Care    Plan of Care  Updated Problems/Interventions  Field    Functional Measures  BERONICA Eating:  [facility] Grooming: [facility] Bathing:  [facility] Upper Body Dressing:  [facility] Lower Body Dressing:  [facility] Toileting:  [facility] Bladder Management  Level of Assistance:  [place]  Frequency/Number of Accidents this Shift:  [facility] Bowel Management  Level of Assistance: [place]  Frequency/Number of Accidents this Shift: [facility] Bed/Chair/Wheelchair Transfer:  [facility] Toilet Transfer:  [facility] Tub/Shower Transfer:  [place]    Previously Documented Mode of Locomotion at Discharge: Field  BERONICA Expected Mode of Locomotion at Discharge: [facility] Walk/Wheelchair:  [facility] Stairs:  [facility] Comprehension:  [facility] Expression:  [facility] Social Interaction:  [facility] Problem Solving:  [facility] Memory:  [place]    Therapy Mode Minutes  Occupational Therapy: Individual: 60 minutes.  Physical Therapy: [place]  Speech Language Pathology:  [place]    Signed by: ANNIE Chadwick/YUSEF

## 2018-04-17 LAB
GLUCOSE BLDC GLUCOMTR-MCNC: 107 MG/DL (ref 70–130)
GLUCOSE BLDC GLUCOMTR-MCNC: 123 MG/DL (ref 70–130)
GLUCOSE BLDC GLUCOMTR-MCNC: 140 MG/DL (ref 70–130)
GLUCOSE BLDC GLUCOMTR-MCNC: 98 MG/DL (ref 70–130)

## 2018-04-17 PROCEDURE — 82962 GLUCOSE BLOOD TEST: CPT

## 2018-04-17 PROCEDURE — 97530 THERAPEUTIC ACTIVITIES: CPT

## 2018-04-17 PROCEDURE — 97110 THERAPEUTIC EXERCISES: CPT

## 2018-04-17 PROCEDURE — 97535 SELF CARE MNGMENT TRAINING: CPT

## 2018-04-17 PROCEDURE — 97112 NEUROMUSCULAR REEDUCATION: CPT

## 2018-04-17 PROCEDURE — G0515 COGNITIVE SKILLS DEVELOPMENT: HCPCS

## 2018-04-17 RX ORDER — CEFTRIAXONE SODIUM 2 G/50ML
2 INJECTION, SOLUTION INTRAVENOUS EVERY 24 HOURS
Status: DISCONTINUED | OUTPATIENT
Start: 2018-04-18 | End: 2018-04-23 | Stop reason: HOSPADM

## 2018-04-17 RX ADMIN — PANTOPRAZOLE SODIUM 40 MG: 40 TABLET, DELAYED RELEASE ORAL at 06:29

## 2018-04-17 RX ADMIN — LACOSAMIDE 100 MG: 100 TABLET, FILM COATED ORAL at 08:40

## 2018-04-17 RX ADMIN — CETIRIZINE HYDROCHLORIDE 5 MG: 10 TABLET, FILM COATED ORAL at 08:36

## 2018-04-17 RX ADMIN — METRONIDAZOLE 500 MG: 500 TABLET, FILM COATED ORAL at 14:58

## 2018-04-17 RX ADMIN — OXYCODONE HYDROCHLORIDE AND ACETAMINOPHEN 500 MG: 500 TABLET ORAL at 08:34

## 2018-04-17 RX ADMIN — METRONIDAZOLE 500 MG: 500 TABLET, FILM COATED ORAL at 22:30

## 2018-04-17 RX ADMIN — METOPROLOL SUCCINATE 50 MG: 50 TABLET, FILM COATED, EXTENDED RELEASE ORAL at 08:37

## 2018-04-17 RX ADMIN — PYRIDOXINE HCL TAB 50 MG 50 MG: 50 TAB at 08:34

## 2018-04-17 RX ADMIN — FOLIC ACID 1 MG: 1 TABLET ORAL at 08:35

## 2018-04-17 RX ADMIN — METRONIDAZOLE 500 MG: 500 TABLET, FILM COATED ORAL at 06:29

## 2018-04-17 RX ADMIN — HYDRALAZINE HYDROCHLORIDE 25 MG: 25 TABLET ORAL at 06:29

## 2018-04-17 RX ADMIN — LACOSAMIDE 100 MG: 100 TABLET, FILM COATED ORAL at 22:30

## 2018-04-17 RX ADMIN — CLOPIDOGREL 75 MG: 75 TABLET, FILM COATED ORAL at 08:35

## 2018-04-17 RX ADMIN — FUROSEMIDE 20 MG: 20 TABLET ORAL at 08:33

## 2018-04-17 RX ADMIN — CEFTRIAXONE SODIUM 2 G: 2 INJECTION, SOLUTION INTRAVENOUS at 23:19

## 2018-04-17 RX ADMIN — ASPIRIN 325 MG: 325 TABLET ORAL at 08:32

## 2018-04-17 RX ADMIN — ESCITALOPRAM 20 MG: 20 TABLET, FILM COATED ORAL at 08:33

## 2018-04-17 RX ADMIN — MONTELUKAST 10 MG: 10 TABLET, FILM COATED ORAL at 08:37

## 2018-04-17 RX ADMIN — VITAMIN D, TAB 1000IU (100/BT) 1000 UNITS: 25 TAB at 08:34

## 2018-04-17 RX ADMIN — Medication 1 TABLET: at 08:35

## 2018-04-17 RX ADMIN — LEVETIRACETAM 1000 MG: 500 TABLET, FILM COATED ORAL at 22:30

## 2018-04-17 RX ADMIN — LEVETIRACETAM 1000 MG: 500 TABLET, FILM COATED ORAL at 08:36

## 2018-04-17 NOTE — PROGRESS NOTES
Inpatient Rehabilitation Plan of Care Note    Plan of Care  Care Plan Reviewed - No updates at this time.    Safety    Performed Intervention(s)  Safety rounds; call light/items within easy reach  Bed Alarm      Body Function Structure    Performed Intervention(s)  Skin assessment every shift  Dressing changes/Wound Care as order  Turning      Sphincter Control    Performed Intervention(s)  Monitor I&O  Timed voids  Use incontinent products      Psychosocial    Performed Intervention(s)  Encourage to verbalize concerns  Offer diversional activities    Signed by: Yolis Machado RN

## 2018-04-17 NOTE — PROGRESS NOTES
Inpatient Rehabilitation Functional Measures Assessment and Plan of Care    Plan of Care  Updated Problems/Interventions  Field    Functional Measures  BERONICA Eating:  [place]  BERONICA Grooming: [facility] Bathing:  [facility] Upper Body Dressing:  [facility] Lower Body Dressing:  [facility] Toileting:  [facility] Bladder Management  Level of Assistance:  [place]  Frequency/Number of Accidents this Shift:  [facility] Bowel Management  Level of Assistance: [place]  Frequency/Number of Accidents this Shift: [facility] Bed/Chair/Wheelchair Transfer:  Activity was not observed.  BERONICA Toilet Transfer:  [facility] Tub/Shower Transfer:  [place]    Previously Documented Mode of Locomotion at Discharge: Field  BERONICA Expected Mode of Locomotion at Discharge: [facility] Walk/Wheelchair:  WHEELCHAIR OBSERVATION   Activity was not observed.    WALK OBSERVATION   Walk Distance Scale = 3.  Distance walked is greater than 150 feet. Walk Score  = 4.  Patient performs 75% or more of effort and requires minimal assistance.  Incidental help/contact guard/steadying was provided. Patient walked a distance  of  275 feet. Patient requires the following assistive device(s): Rolling  walker.  BERONICA Stairs:  Stairs Score = 4.  Incidental help/contact guard/steadying was  provided. Patient performs 75% or more of effort and requires minimal  assistance. Patient negotiated 12 stairs. Patient requires the following  assistive device(s): Handrail(s).    BERONICA Comprehension:  [facility] Expression:  [facility] Social Interaction:  [facility] Problem Solving:  [facility] Memory:  [place]    Therapy Mode Minutes  Occupational Therapy: [place]  Physical Therapy: Individual: 60 minutes.  Speech Language Pathology:  [place]    Signed by: Kera Cabrera PTA

## 2018-04-17 NOTE — PROGRESS NOTES
Inpatient Rehabilitation Functional Measures Assessment    Functional Measures  BERONICA Eating:  [facility] Grooming: [facility] Bathing:  [facility] Upper Body Dressing:  [facility] Lower Body Dressing:  [facility] Toileting:  [facility] Bladder Management  Level of Assistance:  [place]  Frequency/Number of Accidents this Shift:  [facility] Bowel Management  Level of Assistance: [place]  Frequency/Number of Accidents this Shift: [facility] Bed/Chair/Wheelchair Transfer:  [facility] Toilet Transfer:  [facility] Tub/Shower Transfer:  [place]    Previously Documented Mode of Locomotion at Discharge: Field  BERONICA Expected Mode of Locomotion at Discharge: [facility] Walk/Wheelchair:  [facility] Stairs:  [facility] Comprehension:  Auditory comprehension is the usual mode. Comprehension  Score = 6, Modified [place].  Patient comprehends complex/abstract  information in their primary language with only mild difficulty.  BERONICA Expression:  Vocal expression is the usual mode. Expression Score = 6,  Modified Independent.  Patient expresses complex/abstract information in their  primary language with only mild difficulty with tasks.  BERONICA Social Interaction:  Social Interaction Score = 6, Modified Independent.  Patient is modified independent for social interaction, requiring:  BERONICA Problem Solving:  Problem Solving Score = 6, Modified [place].  Patient  makes appropriate decisions in order to solve complex problems, but requires  extra time.  BERONICA Memory:  Memory Score = 6, Modified [place].  Patient is modified  independent for memory, requiring: Requires additional time.    Therapy Mode Minutes  Occupational Therapy: [place]  Physical Therapy: [place]  Speech Language Pathology:  [place]    Signed by: Blaire Rapp RN

## 2018-04-17 NOTE — PROGRESS NOTES
Inpatient Rehabilitation Functional Measures Assessment    Functional Measures  BERONICA Eating:  Branch  [facility] Grooming: [place]  [facility] Bathing:  [place]  [facility] Upper Body Dressing:  [place]anch  [facility] Lower Body Dressing:  [place]anch  [facility] Toileting:  Toileting Score = 4.  Patient requires minimal assistance for  toileting, such as steadying for balance while cleansing or adjusting clothes.  No assistive devices were required.    BERONICA Bladder Management  Level of Assistance:  Bladder Score = 3. Patient performs 50-74% of tasks and  requires moderate assistance for bladder management. [place] provides some assist  to apply OR remove brief.  Frequency/Number of Accidents this Shift:  Bladder accidents this shift:  0 .  Patient has not had an accident this shift.    BERONICA Bowel Management  Level of Assistance: Activity was not observed.  Frequency/Number of Accidents this Shift: Bowel accidents this shift: 0 .  Patient has not had an accident this shift.    BERONICA Bed/Chair/Wheelchair Transfer:  Activity was not observed.  BERONICA Toilet Transfer:  Activity was not observed.  BERONICA Tub/Shower Transfer:  [place]anch    Previously Documented Mode of Locomotion at Discharge: Field  BERONICA Expected Mode of Locomotion at Discharge: [place]  [facility] Walk/Wheelchair:  Branch  [facility] Stairs:  Branch    [facility] Comprehension:  [place]anch  [facility] Expression:  Branch  [facility] Social Interaction:  [place]anch  [facility] Problem Solving:  Branch  [facility] Memory:  [place]anch    Therapy Mode Minutes  Occupational Therapy: Branch  Physical Therapy: [place]  Speech Language Pathology:  [place]    Signed by: RUTH Fitzpatrick

## 2018-04-17 NOTE — PLAN OF CARE
Problem: Patient Care Overview  Goal: Plan of Care Review  Outcome: Ongoing (interventions implemented as appropriate)   04/17/18 0126   Patient Care Overview   IRF Plan of Care Review progress ongoing, continue   Progress, Functional Goals demonstrating adequate progress   Coping/Psychosocial   Plan of Care Reviewed With patient   OTHER   Outcome Summary Patient calm, cooperative,and pleasant this evening. Patient's BP has labile this shift. Patient's SBP this evening has ranged from 110-170 and patient did not take her bedtime Hydralazine. Will continue to monitor throughout the shift. No c/o pain. no unsafe behaviors.        Problem: Skin Injury Risk (Adult)  Goal: Skin Health and Integrity  Outcome: Ongoing (interventions implemented as appropriate)   04/17/18 0126   Skin Injury Risk (Adult)   Skin Health and Integrity making progress toward outcome       Problem: Fall Risk (Adult)  Goal: Absence of Fall  Outcome: Ongoing (interventions implemented as appropriate)   04/17/18 0126   Fall Risk (Adult)   Absence of Fall making progress toward outcome       Problem: Mobility, Physical Impaired (Adult)  Goal: Enhanced Mobility Skills  Outcome: Ongoing (interventions implemented as appropriate)   04/17/18 0126   Mobility, Physical Impaired (Adult)   Enhanced Mobility Skills making progress toward outcome     Goal: Enhanced Functional Ability  Outcome: Ongoing (interventions implemented as appropriate)   04/17/18 0126   Mobility, Physical Impaired (Adult)   Enhanced Functional Ability making progress toward outcome

## 2018-04-17 NOTE — PLAN OF CARE
Problem: Patient Care Overview  Goal: Plan of Care Review  Outcome: Ongoing (interventions implemented as appropriate)   04/17/18 1611   Patient Care Overview   IRF Plan of Care Review progress ongoing, continue   Progress, Functional Goals demonstrating adequate progress   Coping/Psychosocial   Plan of Care Reviewed With patient   OTHER   Outcome Summary Participated in therapies and cooperative with staff.  visited today. Uses call light for assistance. Ambulates with wx. No safety issues noted. Wound vac in place to RLE.       Problem: Skin Injury Risk (Adult)  Goal: Skin Health and Integrity  Outcome: Ongoing (interventions implemented as appropriate)      Problem: Fall Risk (Adult)  Goal: Absence of Fall  Outcome: Ongoing (interventions implemented as appropriate)      Problem: Mobility, Physical Impaired (Adult)  Goal: Enhanced Mobility Skills  Outcome: Ongoing (interventions implemented as appropriate)    Goal: Enhanced Functional Ability  Outcome: Ongoing (interventions implemented as appropriate)

## 2018-04-17 NOTE — THERAPY TREATMENT NOTE
Inpatient Rehabilitation - Occupational Therapy Treatment Note    [facility]     Patient Name: Herlinda Ta  : 1940  MRN: [id number]    Today's Date: 2018                 Admit Date: 2018      Visit Dx:    ICD-10-CM ICD-9-CM   1. 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   • Cerebral vasospasm   • Cervical pain (neck)   • Depressed   • Depression   • Dyspepsia   • Acid reflux   • Groin hematoma   • HTN, goal below 140/90   • HLD (hyperlipidemia)   • Infection of artery   • Iron deficiency anemia   • MRSA (methicillin resistant staph aureus) culture positive   • Rheumatoid arthritis   • Urinary incontinence, mixed   • Subarachnoid hemorrhage         Therapy Treatment          IRF Treatment Summary     Row Name 18 1446 18 1300 18 1000       Evaluation/Treatment Time and Intent    Document Type therapy note (daily note)  -AF therapy note (daily note)  -KB therapy note (daily note)  -KB    Mode of Treatment occupational therapy  -AF speech-language pathology;individual therapy  -KB speech-language pathology;individual therapy  -KB    Patient/Family Observations in AM session pt just waking up, decreased speech and not oriented, word finding crystal, ELIEZER and MD aware, in PM session increased cogntion noted  -AF Pt in bed, assisted pt to wc. Pt alert and cooperative. No complaints.   -KB Pt up in wc. Agreed to therapy. No complaints.   -KB    Start Time (Evaluation/Treatment)  -- 1300  -KB 1000  -KB    Stop Time (Evaluation/Treatment)  -- 1330  -KB 1030  -KB    Recorded by [AF] ANNIE Espinoza [KB] Katherine L Bruton [KB] Katherine L Bruton    Row Name 18 0907             Evaluation/Treatment Time and Intent     Document Type therapy note (daily note)  -LB      Mode of Treatment physical therapy  -LB      Patient/Family Observations Up in w/c. Agreed to PT  -LB      Recorded by [LB] Kera Cabrera, [facility]      Row Name 04/17/18 0907             Safety Awareness/Health Promotion    Additional Documentation --   contact precautions  -LB      Recorded by [LB] Kera Cabrera [facility]      Row Name 04/17/18 1446             Cognition/Psychosocial- PT/OT    Orientation Status (Cognition) oriented to;person;place  -AF      Follows Commands (Cognition) follows one step commands;75-90% accuracy  -AF      Safety Deficit (Cognitive) mild deficit  -AF      Recorded by [AF] Keren Lowe OTR      Row Name 04/17/18 1446             Bed Mobility Assessment/Treatment    Supine-Sit [place] (Bed Mobility) minimum assist (75% patient effort);verbal cues  -AF      Recorded by [AF] Keren Lowe OTR      Row Name 04/17/18 1446 04/17/18 0907          Transfer Assessment/Treatment    Bed-Chair [place] (Transfers) contact guard;minimum assist (75% patient effort);verbal cues  -AF  --     Chair-Bed [place] (Transfers) verbal cues;contact guard;minimum assist (75% patient effort)  -AF  --     Assistive Device (Bed-Chair Transfers) wheelchair;walker, front-wheeled  -AF  --     Sit-Stand [place] (Transfers) contact guard;verbal cues  -AF contact guard;stand by assist  -LB     Stand-Sit [place] (Transfers) contact guard;verbal cues  -AF contact guard;stand by assist;verbal cues  -LB     [place] Level (Toilet Transfer) contact guard;verbal cues  -AF  --     Assistive Device (Toilet Transfer) walker, front-wheeled;grab bars/safety frame  -AF  --     Recorded by [AF] Keren Lowe, OTR [LB] Kera Cabrera PTA     Row Name 04/17/18 1446             Chair-Bed Transfer    Assistive Device (Chair-Bed Transfers) wheelchair;walker, front-wheeled  -AF      Recorded by [AF] Keren Lowe OTR      Row Name 04/17/18  0907             Sit-Stand Transfer    Assistive Device (Sit-Stand Transfers) walker, front-wheeled  -LB      Recorded by [LB] Kera Cabrera PTA      Row Name 04/17/18 0907             Stand-Sit Transfer    Assistive Device (Stand-Sit Transfers) walker, front-wheeled  -LB      Recorded by [LB] Kera Cabrera PTA      Row Name 04/17/18 1446             Toilet Transfer    Type (Toilet Transfer) stand pivot/stand step  -AF      Recorded by [AF] Keren Lowe R      Row Name 04/17/18 0907             Gait/Stairs Assessment/Training    [place] Level (Gait) contact guard;stand by assist  -LB      Assistive Device (Gait) walker, front-wheeled  -LB      Distance in Feet (Gait) 275  -LB      [place] (Stairs) contact guard;verbal cues  -LB      Handrail Location (Stairs) right side (ascending)  -LB      Number of Steps (Stairs) 12  -LB      Ascending Technique (Stairs) step-over-step  -LB      Descending Technique (Stairs) step-to-step  -LB      Comment (Gait/Stairs) amb w/o ad 180 ft, maritza. Amb 180 ft w STC Min A w 1 significant LOB  -LB      Recorded by [LB] Kera Cabrera PTA      Row Name 04/17/18 1446             Safety Issues, Functional Mobility    Comment, Safety Issues/Impairments (Mobility) pt walked into bathroom from EOB with RWX with CGA  -AF      Recorded by [AF] Keren Lowe R      Row Name 04/17/18 1446             Grooming Assessment/Treatment    Grooming [place] grooming skills;contact guard assist  -AF      Grooming Position sink side;supported standing  -AF      Recorded by [AF] Keren Lowe R      Row Name 04/17/18 1446             Toileting Assessment/Treatment    Toileting [place] toileting skills;contact guard assist;verbal cues  -AF      Assistive Device Use (Toileting) grab bar/safety frame  -AF      Toileting Position unsupported sitting;supported standing  -AF      Recorded by [AF] Keren Lowe OTR      Row Name 04/17/18  1446 04/17/18 1300 04/17/18 1000       Pain Scale: Numbers Pre/Post-Treatment    Pain Scale: Numbers, Pretreatment 0/10 - no pain  -AF 0/10 - no pain  -KB 0/10 - no pain  -KB    Recorded by [AF] ANNIE Espinoza [KB] Katherine L Bruton [KB] Katherine L Bruton    Row Name 04/17/18 0907             Pain Scale: Numbers Pre/Post-Treatment    Pain Scale: Numbers, Pretreatment 0/10 - no pain  -LB      Recorded by [LB] Kera Cabrera PTA      Row Name 04/17/18 1446             Static Standing Balance    Level of [place] (Supported Standing, Static Balance) standby assist  -AF      Time Able to Maintain Position (Supported Standing, Static Balance) --  -AF      Comment (Supported Standing, Static Balance) pt stood for 2 trials, 10 mins and 5 mins to participate in groos motor coordination task of playing large checkers on raised table top  -AF      Level of [place] (Unsupported Standing, Static Balance) contact guard assist  -AF      Time Able to Maintain Position (Unsupported Standing, Static Balance) more than 5 minutes  -AF      Recorded by [AF] ANNIE Espinoza      Row Name 04/17/18 0907             Dynamic Balance Activity    Therapeutic Training Performed (Dynamic Balance) backward hopping;side stepping  -LB      Support Needed for Balance (Dynamic Balance Training) --   w/o ad, [place]  -LB      Recorded by [LB] Kera Cabrera PTA      Row Name 04/17/18 1446             Therapeutic Exercise    Therapeutic Exercise neuromuscular re-education  -AF      Recorded by [AF] ANNIE Espinoza      Row Name 04/17/18 1446             Upper Extremity Seated Therapeutic Exercise    Performed, Seated Upper Extremity (Therapeutic Exercise) shoulder flexion/extension;scapular protraction/retraction  -AF      Device, Seated Upper Extremity (Therapeutic Exercise) free weights, barbell   #2.5 dowel nico  -AF      Exercise Type, Seated Upper Extremity (Therapeutic Exercise) AROM (active range of  motion);resistive exercise  -AF      Expected Outcomes, Seated Upper Extremity (Therapeutic Exercise) improve functional tolerance, self-care activity  -AF      Sets/Reps Detail, Seated Upper Extremity (Therapeutic Exercise) 15 reps x 2 stes  -AF      Transfers Skills, Training to Functional Activity, Seated Upper Extremity (Therapeutic Exercise) transfers skills to functional activity most of the time  -AF      Recorded by [AF] ANNIE Espinoza      Row Name 04/17/18 1446             Neuromuscular Re-education    Comment (Neuromuscular Re-education) pt required min vc's to participate in familiar activity of playing checkers at times required increased processing time when taking her turn  -AF      Recorded by [AF] ANNIE Espinoza      Row Name 04/17/18 1446 04/17/18 0907          Positioning and Restraints    Pre-Treatment Position in bed  -AF  --     Post Treatment Position wheelchair  -AF wheelchair  -LB     In Wheelchair sitting;with PT   in AM and PM sessions  -AF sitting;call light within reach;exit alarm on  -LB     Recorded by [AF] Keren Lowe OTR [LB] Kera Cabrera PTA       User Key  (r) = Recorded By, (t) = Taken By, (c) = Cosigned By    Initials Name Effective Dates    LB Kera Cabrera PTA 03/07/18 -     AF ANNIE Espinoza 04/03/18 -     KB Katherine L Bruton 03/07/18 -           Wound Right groin (Active)   Dressing Appearance dry;intact 4/17/2018 11:18 AM   Closure TULIO 4/17/2018 11:18 AM   Base dressing in place, unable to visualize 4/17/2018 11:18 AM   Drainage Characteristics/Odor serosanguineous 4/16/2018  9:44 PM   Drainage Amount moderate 4/16/2018  9:44 PM   Dressing Care, Wound other (see comments) 4/17/2018 11:18 AM       NPWT (Negative Pressure Wound Therapy) R groin (Active)   Therapy Setting continuous therapy 4/16/2018  9:44 PM   Dressing foam, black 4/16/2018  9:44 PM   Pressure Setting 50 mmHg 4/16/2018  9:44 PM         OT Recommendation and Plan                        OT IRF GOALS     Row Name 04/12/18 1524             Transfer Goal 1 (OT-IRF)    Activity/Assistive Device (Transfer Goal 1, OT-IRF) toilet;shower chair;walk-in shower;tub  -SO      [place] Level (Transfer Goal 1, OT-IRF) supervision required;verbal cues required  -SO      Time Frame (Transfer Goal 1, OT-IRF) long term goal (LTG);2 weeks  -SO         LB Dressing Goal 1 (OT-IRF)    Activity/Device (LB Dressing Goal 1, OT-IRF) lower body dressing  -SO      [place] (LB Dressing Goal 1, OT-IRF) supervision required  -SO      Time Frame (LB Dressing Goal 1, OT-IRF) long term goal (LTG);2 weeks  -SO         Toileting Goal 1 (OT-IRF)    Activity/Device (Toileting Goal 1, OT-IRF) toileting skills, all;grab bar/safety frame  -SO      [place] Level (Toileting Goal 1, OT-IRF) supervision required  -SO      Time Frame (Toileting Goal 1, OT-IRF) long term goal (LTG);2 weeks  -SO         Strength Goal 1 (OT-IRF)    Strength Goal 1 (OT-IRF) Pt to increase overall BUE strength to 4-/5 to assist with functional activities and ADLs.  -SO      Time Frame (Strength Goal 1, OT-IRF) long term goal (LTG);2 weeks  -SO         Caregiver Training Goal 1 (OT-IRF)    Caregiver Training Goal 1 (OT-IRF) Pt and caregiver to be independent with AE, HEP, home safety, etc. at d/c.  -SO      Time Frame (Caregiver Training Goal 1, OT-IRF) long term goal (LTG);2 weeks  -SO        User Key  (r) = Recorded By, (t) = Taken By, (c) = Cosigned By    Initials Name Provider Type    SO Margo Jacob, OTR Occupational Therapist                 Time Calculation:           Time Calculation- OT     Row Name 04/17/18 1452 04/17/18 1450          Time Calculation- OT    OT Start Time 1330  -AF 0830  -AF     OT Stop Time 1400  -AF 0900  -AF     OT Time Calculation (min) 30 min  -AF 30 min  -AF       User Key  (r) = Recorded By, (t) = Taken By, (c) = Cosigned By    Initials Name Provider Type    AF Keren Lowe, OTR Occupational  Therapist             Therapy Charges for Today     Code Description Service Date Service Provider Modifiers Qty    48174803158 HC OT SELF CARE/MGMT/TRAIN EA 15 MIN 4/16/2018 Keren Lowe OTR GO 2    85881167593 HC OT NEUROMUSC RE EDUCATION EA 15 MIN 4/16/2018 Keren Lowe OTR GO 1    14477848106 HC OT THER PROC EA 15 MIN 4/16/2018 Keren Lowe OTR GO 1    66819805200 HC OT SELF CARE/MGMT/TRAIN EA 15 MIN 4/17/2018 Keren Lowe OTR GO 2    32851386839 HC OT THERAPEUTIC ACT EA 15 MIN 4/17/2018 Keren Lowe OTR GO 1    47101721333 HC OT NEUROMUSC RE EDUCATION EA 15 MIN 4/17/2018 Keren Lowe OTR GO 1                   Keren Lowe OTR  4/17/2018

## 2018-04-17 NOTE — PROGRESS NOTES
QUALITY INDICATORS  Active Diagnosis: Comorbidities and Co-existing Conditions:   Patient does not  have PAD, PVD, or Diabetes Mellitus  Health Conditions: Patient has not had any falls in the past year. Patient has  had major surgery during the 100 days prior to admission.  Skin Conditions: Unhealed Pressure Ulcer(s) at Stage 1 or Higher on Admission:  No.    Signed by: Alfredo Salas RN

## 2018-04-17 NOTE — PROGRESS NOTES
Inpatient Rehabilitation Functional Measures Assessment    Functional Measures  BERONICA Eating:  [facility] Grooming: [facility] Bathing:  [facility] Upper Body Dressing:  [facility] Lower Body Dressing:  [facility] Toileting:  [facility] Bladder Management  Level of Assistance:  [place]  Frequency/Number of Accidents this Shift:  [facility] Bowel Management  Level of Assistance: [place]  Frequency/Number of Accidents this Shift: [facility] Bed/Chair/Wheelchair Transfer:  [facility] Toilet Transfer:  [facility] Tub/Shower Transfer:  [place]    Previously Documented Mode of Locomotion at Discharge: Field  BERONICA Expected Mode of Locomotion at Discharge: [facility] Walk/Wheelchair:  [facility] Stairs:  [facility] Comprehension:  Auditory comprehension is the usual mode. Comprehension  Score = 6, Modified [place].  Patient comprehends complex/abstract  information in their primary language, requiring: Additional time.  BERONICA Expression:  Vocal expression is the usual mode. Expression Score = 7,  Independent.  Patient expresses complex/abstract information in their primary  language.  Patient is completely independent for vocal expression.  There are no  activity limitations.  BERONICA Social Interaction:  Social Interaction Score = 6, Modified Independent.  Patient is modified independent for social interaction, requiring: Medications.    BERONICA Problem Solving:  Patient does not make appropriate decisions in order to  solve complex problems without assistance from a helper. Problem Solving Score =  5, Supervision.  Patient makes appropriate decisions in order to solve routine  problems with directing only under stressful or unfamiliar conditions, but no  more than 10% of the time, for the following behavior(s): Difficulty weighing  alternatives/making choices.  BERONICA Memory:  Memory Score = 6, Modified [place].  Patient is modified  independent for memory, requiring: Requires additional time.    Therapy Mode  Minutes  Occupational Therapy: [place]  Physical Therapy: Branch  Speech Language Pathology:  [place]    Signed by: Yolis Machado RN

## 2018-04-17 NOTE — THERAPY TREATMENT NOTE
Inpatient Rehabilitation - Physical Therapy Treatment Note  [facility]     Patient Name: Herlinda Ta  : 1940  MRN: [id number]    Today's Date: 2018                 Admit Date: 2018      Visit Dx:      ICD-10-CM ICD-9-CM   1. 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   • Cerebral vasospasm   • Cervical pain (neck)   • Depressed   • Depression   • Dyspepsia   • Acid reflux   • Groin hematoma   • HTN, goal below 140/90   • HLD (hyperlipidemia)   • Infection of artery   • Iron deficiency anemia   • MRSA (methicillin resistant staph aureus) culture positive   • Rheumatoid arthritis   • Urinary incontinence, mixed   • Subarachnoid hemorrhage       Therapy Treatment    Evaluation/Coping    Evaluation/Treatment Time and Intent  Document Type: therapy note (daily note) (18 09 : Kera Cabrera PTA)  Mode of Treatment: physical therapy (18 09 : Kera Cabrera PTA)  Patient/Family Observations: Up in w/c. Agreed to PT (18 : Kera Cabrera PTA)    Vitals/Pain/Safety    Safety Awareness/Health Promotion  Additional Documentation:  (contact precautions) (18 : Kera Cabrera PTA)  Pain Scale: Numbers Pre/Post-Treatment  Pain Scale: Numbers, Pretreatment: 0/10 - no pain (18 : Kera Cabrera PTA)  Positioning and Restraints  Post Treatment Position: wheelchair (18 : Kera Cabrera PTA)  In Wheelchair: sitting, call light within reach, exit alarm on (18 : Kera Cabrera PTA)    Cognition/Communication         Oral Motor/Eating         Mobility/Basic Activities/Instrumental Activities/Motor/Modality    Bed Mobility Assessment/Treatment  Sit-Supine [place] (Bed  Mobility): contact guard, supervision (04/17/18 0907 : Kera Cabrera PTA)  Assistive Device (Bed Mobility): bed rails (04/17/18 0907 : Kera Cabrera PTA)  Sit-Stand Transfer  Sit-Stand [place] (Transfers): contact guard, stand by assist (04/17/18 0907 : Kera Cabrera PTA)  Assistive Device (Sit-Stand Transfers): walker, front-wheeled (04/17/18 0907 : Kera Cabrera PTA)  Stand-Sit Transfer  Stand-Sit [place] (Transfers): contact guard, stand by assist, verbal cues (04/17/18 0907 : Kera Cabrera PTA)  Assistive Device (Stand-Sit Transfers): walker, front-wheeled (04/17/18 0907 : Kera Cabrera PTA)  Gait/Stairs Assessment/Training  [place] Level (Gait): contact guard, stand by assist (04/17/18 0907 : Kera Cabrera PTA)  Assistive Device (Gait): walker, front-wheeled (04/17/18 0907 : Kera Cabrera PTA)  Distance in Feet (Gait): 275 (04/17/18 0907 : Kera Cabrera PTA)  Deviations/Abnormal Patterns (Gait): genesis decreased (cues to scan to the right. Runs into objects on the right) (04/17/18 0907 : Kera Cabrera PTA)  Right Sided Gait Deviations: heel strike decreased (04/17/18 0907 : Kera Cabrera PTA)  [place] Level (Stairs): contact guard, verbal cues (04/17/18 0907 : Kera Cabrera PTA)  Handrail Location (Stairs): right side (ascending) (04/17/18 0907 : Kera Cabrera PTA)  Number of Steps (Stairs): 12 (04/17/18 0907 : Kera Cabrera PTA)  Ascending Technique (Stairs): step-over-step (04/17/18 0907 : Kera Cabrera PTA)  Descending Technique (Stairs): step-to-step (04/17/18 0907 : Kera Cabrera PTA)  Comment (Gait/Stairs): amb w/o ad 180 ft, maritza. Amb 180 ft w STC Min A w 1 significant LOB (04/17/18 0907 : Kera Cabrera PTA)              ROM/MMT                   Sensory/Myotome/Dermatome/Edema               Posture/Balance/Special Tests/Exercise/Transportation/Sexual Function    Dynamic Balance Activity  Therapeutic  Training Performed (Dynamic Balance): side stepping, ambulate backward (04/17/18 0907 : Kera Cabrera PTA)  Support Needed for Balance (Dynamic Balance Training):  (w/o ad, mar[place]) (04/17/18 0907 : Kera Cabrera PTA)  Comment (Dynamic Balance Training): reaching, cg/maritza (04/17/18 0907 : Kera Cabrera PTA)              Orthotics/Residual Limb/Prosthetic Management              Outcome Summary                 PT Recommendation and Plan                         PT IRF GOALS     Row Name 04/12/18 0900             Bed Mobility Goal 1 (PT-IRF)    Activity/Assistive Device (Bed Mobility Goal 1, PT-IRF) bed mobility activities, all  -EE      [place] Level (Bed Mobility Goal 1, PT-IRF) independent  -EE      Time Frame (Bed Mobility Goal 1, PT-IRF) long term goal (LTG);2 weeks  -EE      Progress/Outcomes (Bed Mobility Goal 1, PT-IRF) goal ongoing  -EE         Transfer Goal 1 (PT-IRF)    Activity/Assistive Device (Transfer Goal 1, PT-IRF) sit-to-stand/stand-to-sit;bed-to-chair/chair-to-bed   with A.A.D.  -EE      [place] Level (Transfer Goal 1, PT-IRF) conditional independence  -EE      Time Frame (Transfer Goal 1, PT-IRF) long term goal (LTG);2 weeks  -EE      Progress/Outcomes (Transfer Goal 1, PT-IRF) goal ongoing  -EE         Transfer Goal 2 (PT-IRF)    Activity/Assistive Device (Transfer Goal 2, PT-IRF) car transfer  -EE      [place] Level (Transfer Goal 2, PT-IRF) supervision required  -EE      Time Frame (Transfer Goal 2, PT-IRF) long term goal (LTG);2 weeks  -EE      Progress/Outcomes (Transfer Goal 2, PT-IRF) goal ongoing  -EE         Gait/Walking Locomotion Goal 1 (PT-IRF)    Activity/Assistive Device (Gait/Walking Locomotion Goal 1, PT-IRF) gait (walking locomotion)   with A.A.D.  -EE      Gait/Walking Locomotion Distance Goal 1 (PT-IRF) 160  -EE      [place] Level (Gait/Walking Locomotion Goal 1, PT-IRF) supervision required  -EE      Time Frame (Gait/Walking Locomotion Goal 1,  PT-IRF) long term goal (LTG);2 weeks  -EE      Progress/Outcomes (Gait/Walking Locomotion Goal 1, PT-IRF) goal ongoing  -EE         Stairs Goal 1 (PT-IRF)    Activity/Assistive Device (Stairs Goal 1, PT-IRF) ascending stairs;descending stairs;using handrail, left;using handrail, right  -EE      Number of Stairs (Stairs Goal 1, PT-IRF) 12  -EE      [place] Level (Stairs Goal 1, PT-IRF) supervision required  -EE      Time Frame (Stairs Goal 1, PT-IRF) long term goal (LTG);2 weeks  -EE      Progress/Outcomes (Stairs Goal 1, PT-IRF) goal ongoing  -EE        User Key  (r) = Recorded By, (t) = Taken By, (c) = Cosigned By    Initials Name Provider Type    EE Enma Bernstein, FRANCESCA Physical Therapist                   Time Calculation:           PT Charges     Row Name 04/17/18 1624 04/17/18 0907          Time Calculation    Start Time 1400  -LB 0900  -LB     Stop Time 1430  -LB 0930  -LB     Time Calculation (min) 30 min  -LB 30 min  -LB       User Key  (r) = Recorded By, (t) = Taken By, (c) = Cosigned By    Initials Name Provider Type    LB Kera Cabrera PTA Physical Therapy Assistant            Therapy Charges for Today     Code Description Service Date Service Provider Modifiers Qty    11239766354 HC PT THER PROC EA 15 MIN 4/16/2018 Kera Cabrera PTA GP 4    92050087409 HC PT THER PROC EA 15 MIN 4/17/2018 Kera Cabrera PTA GP 4                   Kera Cabrera PTA  4/17/2018

## 2018-04-17 NOTE — PROGRESS NOTES
LOS: 6 days   Patient Care Team:  Stanislaw Esposito MD as PCP - General     Chief Complaint:   SAH, opthalmic artery aneurysm s/p endovascular embolization.   Right Groin hematoma, right groin wound infection s/p wound vac placement.   HTN        Subjective     History of Present Illness    Subjective  She denies significant headache.  Tolerates therapies. She was drowsy when first up but later readily alert, mobilizing and conversing well.  She refused hydralazine night before last. Metoprolol increased yesterday. - yesterday. Hydralazine held last PM and this afternoon again as .     History taken from: patient    Objective     Vital Signs  Temp:  [96 °F (35.6 °C)-98.5 °F (36.9 °C)] 96 °F (35.6 °C)  Heart Rate:  [73-87] 73  Resp:  [18] 18  BP: (110-175)/(60-85) 120/62    Objective  MENTAL STATUS-awake, alert  HEENT -  LUNGS -CTA  HEART -RRR  ABD - NABS, soft, NT  EXT - VAC RIGHT GROIN  NEURO - takes resistance bilateral upper extremities and lower extremities      Results Review:     I reviewed the patient's new clinical results.    Results from last 7 days  Lab Units 04/16/18  0507 04/12/18  0905   WBC 10*3/mm3 5.50 5.23   HEMOGLOBIN g/dL 9.3* 9.3*   HEMATOCRIT % 29.3* 28.3*   PLATELETS 10*3/mm3 179 181         Results from last 7 days  Lab Units 04/16/18  0507 04/12/18  0905   SODIUM mmol/L 137 135*   POTASSIUM mmol/L 3.7 3.6   CHLORIDE mmol/L 102 100   CO2 mmol/L 22.3 20.5*   BUN mg/dL 20 20   CREATININE mg/dL 0.89 1.04*   CALCIUM mg/dL 8.6 8.4*   BILIRUBIN mg/dL 0.8 0.8   ALK PHOS U/L 51 60   ALT (SGPT) U/L 19 29   AST (SGOT) U/L 33* 20   GLUCOSE mg/dL 89 104*       Results from last 7 days  Lab Units 04/16/18  0507   CRP mg/dL 5.77*       Results from last 7 days  Lab Units 04/16/18  0507   SED RATE mm/hr 36*         Medication Review: DONE  Scheduled Meds:    aspirin 325 mg Oral Daily   ceftriaxone 2 g Intravenous Daily   cetirizine 5 mg Oral Daily   cholecalciferol 1,000 Units  Oral Daily   clopidogrel 75 mg Oral Daily   escitalopram 20 mg Oral Daily   folic acid 1 mg Oral Daily   furosemide 20 mg Oral Daily   hydrALAZINE 25 mg Oral Q8H   lacosamide 100 mg Oral Q12H   levETIRAcetam 1,000 mg Oral BID   metoprolol succinate XL 50 mg Oral Q24H   metroNIDAZOLE 500 mg Oral Q8H   montelukast 10 mg Oral Daily   multivitamin 1 tablet Oral Daily   pantoprazole 40 mg Oral Q AM   vitamin B-6 50 mg Oral Daily   vitamin C 500 mg Oral Daily     Continuous Infusions:   PRN Meds:.•  albuterol  •  EPINEPHrine  •  hydrALAZINE  •  oxyCODONE-acetaminophen **OR** oxyCODONE-acetaminophen  •  triamcinolone      Assessment/Plan     Active Problems:    Subarachnoid hemorrhage      Assessment & Plan   SAH, opthalmic artery aneurysm s/p endovascular embolization.   Aspirin / Plavix  Decadron taper  GI prophylaxis - Protonix  Seizure - Vimpat / Keppra  HTN - SBP goal 120-180, s/p vasospams - Patient's SBP goal is 120-180 per Neurosurgery.  April 12 - SBP was averaging 140's and ranged 120's to 180's recently at [facility].  She was on Hydralazine 25 mg q  8 hours scheduled and Hydralazine 10 mg IV q 4 hours prn SBP >180 or DBP > 100 with last dose 4-11-18 at 07:35 there.  At discharge she was restarted on Lasix 20 mg daily, Metoprolol 100 mg daily and Doxazosin 2 mg HS , which she was not on there.  SBP was 168 last PM and 118 this AM.  Will continue Hydralazine scheduled and po prn and Lasix, but hold additional Metoprolol and Doxazosin for now to avoid excessive BP reduction s/p aneurysm stent and s/p vasospasm. She completed Nimotop April 11.   April 13 -  - resume Metoprolol succinate at lower dose of 25 mg daily  April 16 - BP elevated last PM SBP 180s - given hydralazine prn, increased Metoprolol to 50 mg daily; may titrate up on hydralazine, follow pattern.  April 17 - She refused hydralazine night before last. Metoprolol increased yesterday. - yesterday. Hydralazine held last PM and this  afternoon again as . Continue to follow pattern.       Right Groin hematoma, right groin wound infection s/p wound vac placement.   ID - Rocephin / Metronidazole x 4 weeks - Clarified with [facility] - Ceftriazone started 4-7 and Metronidazole started 4-11 - will do stop date May 9.April 16 - weekly labs reviewed. Results to ID.     DVT prophylaxis - SCDs         TEAM CONF - April 13 - BALANCE AND PROPRIOCEPTIVE DEFICITS, SOMETIMES RUN INTO OBJECTS TO RIGHT, CORRECT WITH CUING. TRANSFERS CTG. GAIT 160 FEET RW CTG/MIN ASSIST. WOUND VAC RIGHT GROIN. BATH MIN. LBD MOD, UBD MIN ASSIST. COGNITION - SLOW PROCESSING AND MILD DIFFICULTY WITH EXECUTIVE FUNCTION. CONTINENT BOWEL AND BLADDER.  ELOS- ONE WEEK    Misha Prince MD  04/17/18  4:23 PM    Time:

## 2018-04-17 NOTE — PROGRESS NOTES
PPS CMG Coordinator  Inpatient Rehabilitation Admission    Ethnic Group: White.  Marital Status:  Marital Status: .    IRF Admission Date:  04/11/2018  Admission Class: Initial Rehab.  Admit From:  [facility]    Pre-Hospital Living: Home. Pre-Hospital Living  With: (2) Family/Relatives.    Payment Sources: Primary: Medicare - Medicare Advantage  Secondary: Not Listed.  Impairment Group: 02.1 Non-traumatic  Date of Onset of Impairment: 03/27/2018    Etiologic Diagnosis Code(s):  Rank Code      Description  1    I60.9     Nontraumatic subarachnoid hemorrhage,                 unspecified    Comorbidities:      Are there any arthritis conditions recorded for Impairment Group, Etiologic  Diagnosis, or Comorbid Conditions that meet all of the regulatory requirements  for IRF classification (in 42 .29(b)(2)(x), (xi), and xii))? No    BERONICA Bladder Accidents:  3 - Accidents.  Bladder Score = 5.  One (1) bladder accident.  BERONICA Bowel Accident: 0 -Accidents.  Bowel Score = 7. Patient has no accidents.    Presence of Pressure Ulcer:  No observed/documented pressure ulcers.    MEDICAL NEEDS  Height on Admission:  62 inches.  Weight on Admission:  136 pounds.    QUALITY INDICATORS  Prior Functioning:  Self Care: Patient completed the activities by him/herself, with or without an  assistive device, with no assistance from a helper.  Indoor Mobility: Patient completed the activities by him/herself, with or  without an assistive device, with no assistance from a helper.  Stairs: Patient completed the activities by him/herself, with or without an  assistive device, with no assistance from a helper.  Functional Cognition: Patient completed the activities by him/herself, with or  without an assistive device, with no assistance from a helper.  Prior Device Use: Patient does not use manual or motorized wheelchair or  scooter, mechanical lift, walker, or an orthotic/prosthesis.    Bladder and Bowel: Bladder  Continence: Incontinent less than daily (e.g., once  or twice during the 3-day assessment period).  Bowel Continence: Always continent (no documented incontinence).  Swallowing/Nutritional Status: Regular food (solids and liquids swallowed safely  without supervision or modified food or liquid consistency).  Special Conditions: Patient did not receive total parenteral nutrition treatment  at the time of admission.    Signed by: Alfredo Salas RN

## 2018-04-17 NOTE — THERAPY TREATMENT NOTE
Inpatient Rehabilitation - Speech Language Pathology Treatment Note    [facility]       Patient Name: Herlinda Ta  : 1940  MRN: [id number]    Today's Date: 2018           Admit Date: 2018      Visit Dx:        ICD-10-CM ICD-9-CM   1. 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   • Cerebral vasospasm   • Cervical pain (neck)   • Depressed   • Depression   • Dyspepsia   • Acid reflux   • Groin hematoma   • HTN, goal below 140/90   • HLD (hyperlipidemia)   • Infection of artery   • Iron deficiency anemia   • MRSA (methicillin resistant staph aureus) culture positive   • Rheumatoid arthritis   • Urinary incontinence, mixed   • Subarachnoid hemorrhage          Therapy Treatment    Evaluation/Coping    Evaluation/Treatment Time and Intent  Document Type: therapy note (daily note) (18 1300 : Katherine L Bruton)  Mode of Treatment: speech-language pathology, individual therapy (18 1300 : Katherine L Bruton)  Patient/Family Observations: Pt in bed, assisted pt to wc. Pt alert and cooperative. No complaints.  (18 1300 : Katherine L Bruton)  Start Time (Evaluation/Treatment): 1300 (18 1300 : Katherine L Bruton)  Stop Time (Evaluation/Treatment): 1330 (18 1300 : Katherine L Bruton)    Vitals/Pain/Safety    Pain Scale: Numbers Pre/Post-Treatment  Pain Scale: Numbers, Pretreatment: 0/10 - no pain (18 1300 : Katherine L Bruton)    Cognition/Communication         Oral Motor/Eating         Mobility/Basic Activities/Instrumental Activities/Motor/Modality                   ROM/MMT                   Sensory/Myotome/Dermatome/Edema               Posture/Balance/Special Tests/Exercise/Transportation/Sexual Function                    Orthotics/Residual Limb/Prosthetic Management              Outcome Summary         EDUCATION    The patient has been educated in the following areas:     Cognitive Impairment.    SLP Recommendation and Plan                                                                SLP GOALS     Row Name 04/17/18 1300 04/17/18 1000 04/16/18 1500       Memory Skills Goal 1 (SLP)    Improve Memory Skills Through Goal 1 (SLP) recalling related word lists with an imposed delay   recall list of 4 words in same category  -KB recalling unrelated word lists immediately   4-word list   -KB  --    Progress (Memory Skills Goal 1, SLP) 60%;independently (over 90% accuracy);100%;with moderate cues (50-74%)  -KB 30%;independently (over 90% accuracy);90%;with moderate cues (50-74%)  -KB  --    Progress/Outcomes (Memory Skills Goal 1, SLP) continuing progress toward goal;goal ongoing  -KB progress slower than expected  -KB  --       Memory Skills Goal (SLP)    Improve Memory Skills Through Goal (SLP)  -- visual memory task   CT SlapJack L2  -KB visual memory task   CT Slap [place]ain L2  -KB    Progress (Memory Skills Goal, SLP)  -- 70%;with minimal cues (75-90%)  -KB 30%;independently (over 90% accuracy)  -KB    Progress/Outcomes (Memory Skills Goal, SLP)  -- goal ongoing  -KB goal ongoing  -KB    Comment (Memory Skills Goal, SLP)  --  -- Pt required cues for sustained attention and memory for stimuli. Dificulty maintaining alertness during this task, abandoned before completing d/t lack of alertness.   -KB       Organizational Skills Goal 2 (SLP)    Improve Thought Organization Through Goal 2 (SLP) completing a divergent naming task   abstract-expensive items; concrete-vegetables  -KB  --  --    Progress (Thought Organization Skills Goal 2, SLP) other (comment)   4 items in each category in 1 minute  -KB  --  --    Progress/Outcomes (Thought Organization Skills Goal 2, SLP) goal ongoing  -KB  --  --    Comment (Thought Organization Skills Goal  2, SLP) Able to name up to 3 more items in a concrete category given additional time and verbal cue  -KB  --  --       Organizational Skills Goal (SLP)    Improve Thought Organization Through Goal (SLP) sorting items into 4 concrete categories  -KB  --  --    Progress (Thought Organization Skills Goal, SLP) 60%;independently (over 90% accuracy);100%;with moderate cues (50-74%)  -KB  --  --    Progress/Outcomes (Thought Organization Skills Goal, SLP) goal ongoing  -KB  --  --    Comment (Thought Organization Skills Goal, SLP) Performance declined since previous session-cause unknown.   -KB  --  --       Reasoning Goal 1 (SLP)    Improve Reasoning Through Goal 1 (SLP) complete deductive reasoning task   category deduction given four items  -KB  --  --    Progress (Reasoning Goal 1, SLP) 70%;independently (over 90% accuracy);100%;with minimal cues (75-90%)  -KB  --  --    Progress/Outcomes (Reasoning Goal 1, SLP) goal ongoing  -KB  --  --       Reasoning Goal (SLP)    Improve Reasoning Through Goal (SLP)  -- diagnostic treatment: mod-complex word puzzle  -KB  --    Progress (Reasoning Goal, SLP)  -- 60%;independently (over 90% accuracy);100%;with moderate cues (50-74%)  -KB  --    Progress/Outcomes (Reasoning Goal, SLP)  -- goal ongoing  -KB  --       Executive Functional Skills Goal 1 (SLP)    Improve Executive Function Skills Goal 1 (SLP)  --  -- exhibit cognitive flexibility   providing two definitions for words  -KB    Progress (Executive Function Skills Goal 1, SLP)  --  -- 60%;independently (over 90% accuracy);90%;with moderate cues (50-74%)  -KB    Progress/Outcomes (Executive Function Skills Goal 1, SLP)  --  -- goal ongoing  -KB    Row Name 04/16/18 1300 04/16/18 1000          Memory Skills Goal 1 (SLP)    Improve Memory Skills Through Goal 1 (SLP) recalling related word lists with an imposed delay  -KB  --     Progress (Memory Skills Goal 1, SLP) 40%   MIN-MOD verbal cues needed consistently  -KB  --      "Progress/Outcomes (Memory Skills Goal 1, SLP) goal ongoing  -KB  --        Memory Skills Goal 2 (SLP)    Improve Memory Skills Through Goal 2 (SLP)  -- recall details from a word list   recall word from list by word placement  -KB     Progress (Memory Skills Goal 2, SLP)  -- 0%  -KB     Progress/Outcomes (Memory Skills Goal 2, SLP)  -- goal ongoing  -KB     Comment (Memory Skills Goal 2, SLP)  -- Pt not able to recall detail from a list despite cues, transitioned task into only recall of list of 4 words.   -KB        Memory Skills Goal (SLP)    Improve Memory Skills Through Goal (SLP)  -- Immediate recall of list of 4 words  -KB     Progress (Memory Skills Goal, SLP)  -- 30%;independently (over 90% accuracy)  -KB     Progress/Outcomes (Memory Skills Goal, SLP)  -- goal ongoing  -KB     Comment (Memory Skills Goal, SLP)  -- Pt required consistent repetitions and additional MAX cues to recall lists of 4 words. Some awareness of deficit noted, but pt did not self-correct.   -KB        Organizational Skills Goal (SLP)    Improve Thought Organization Through Goal (SLP) sorting items into 4 concrete categories  -KB completing word matrix given category and initial letter  -KB     Time Frame (Thought Organization Skills Goal, SLP)  -- by discharge  -KB     Progress (Thought Organization Skills Goal, SLP) 100%;independently (over 90% accuracy)  -KB 30%;independently (over 90% accuracy)   consistent MIN-MOD verbal cues required for word generation  -KB     Progress/Outcomes (Thought Organization Skills Goal, SLP) goal ongoing  -KB goal ongoing  -KB     Comment (Thought Organization Skills Goal, SLP)  -- Provided similar task for pt to work on in her room.   -KB        Reasoning Goal 1 (SLP)    Improve Reasoning Through Goal 1 (SLP) complete deductive reasoning task   category deduction given 4 items  -KB  --     Progress (Reasoning Goal 1, SLP) 70%;independently (over 90% accuracy)   MIN verbal cue to correctly label \"office " "supplies\" category  -KB  --     Progress/Outcomes (Reasoning Goal 1, SLP) good progress toward goal;goal ongoing  -KB  --     Comment (Reasoning Goal 1, SLP) Pt named categories given items belonging in each.   -KB  --       User Key  (r) = Recorded By, (t) = Taken By, (c) = Cosigned By    Initials Name Provider Type    KB Katherine L Bruton Speech and Language Pathologist                    Time Calculation:             Time Calculation- SLP     Row Name 04/17/18 1330 04/17/18 1030          Time Calculation- SLP    SLP Start Time 1300  -KB 1000  -KB     SLP Stop Time 1330  -KB 1030  -KB     SLP Time Calculation (min) 30 min  -KB 30 min  -KB       User Key  (r) = Recorded By, (t) = Taken By, (c) = Cosigned By    Initials Name Provider Type    KB Katherine L Bruton Speech and Language Pathologist            Therapy Charges for Today     Code Description Service Date Service Provider Modifiers Qty    71626964220 HC ST DEV OF COGN SKILLS EACH 15 MIN 4/16/2018 Katherine L Bruton  4    [id number]  ST DEV OF COGN SKILLS EACH 15 MIN 4/17/2018 Katherine L Bruton  4                           Katherine L Bruton  4/17/2018      "

## 2018-04-18 LAB
GLUCOSE BLDC GLUCOMTR-MCNC: 120 MG/DL (ref 70–130)
GLUCOSE BLDC GLUCOMTR-MCNC: 99 MG/DL (ref 70–130)

## 2018-04-18 PROCEDURE — 97110 THERAPEUTIC EXERCISES: CPT

## 2018-04-18 PROCEDURE — G0515 COGNITIVE SKILLS DEVELOPMENT: HCPCS

## 2018-04-18 PROCEDURE — 82962 GLUCOSE BLOOD TEST: CPT

## 2018-04-18 PROCEDURE — 97535 SELF CARE MNGMENT TRAINING: CPT

## 2018-04-18 PROCEDURE — 25010000003 CEFTRIAXONE PER 250 MG: Performed by: PHYSICAL MEDICINE & REHABILITATION

## 2018-04-18 RX ORDER — HYDRALAZINE HYDROCHLORIDE 10 MG/1
10 TABLET, FILM COATED ORAL EVERY 8 HOURS SCHEDULED
Status: DISCONTINUED | OUTPATIENT
Start: 2018-04-18 | End: 2018-04-23 | Stop reason: HOSPADM

## 2018-04-18 RX ORDER — METOPROLOL SUCCINATE 25 MG/1
25 TABLET, EXTENDED RELEASE ORAL
Status: DISCONTINUED | OUTPATIENT
Start: 2018-04-19 | End: 2018-04-20

## 2018-04-18 RX ADMIN — METRONIDAZOLE 500 MG: 500 TABLET, FILM COATED ORAL at 15:18

## 2018-04-18 RX ADMIN — METRONIDAZOLE 500 MG: 500 TABLET, FILM COATED ORAL at 06:12

## 2018-04-18 RX ADMIN — Medication 1 TABLET: at 08:40

## 2018-04-18 RX ADMIN — LACOSAMIDE 100 MG: 100 TABLET, FILM COATED ORAL at 08:59

## 2018-04-18 RX ADMIN — HYDRALAZINE HYDROCHLORIDE 25 MG: 25 TABLET ORAL at 06:13

## 2018-04-18 RX ADMIN — ESCITALOPRAM 20 MG: 20 TABLET, FILM COATED ORAL at 08:41

## 2018-04-18 RX ADMIN — OXYCODONE HYDROCHLORIDE AND ACETAMINOPHEN 1 TABLET: 5; 325 TABLET ORAL at 19:20

## 2018-04-18 RX ADMIN — ASPIRIN 325 MG: 325 TABLET ORAL at 08:42

## 2018-04-18 RX ADMIN — LEVETIRACETAM 1000 MG: 500 TABLET, FILM COATED ORAL at 20:39

## 2018-04-18 RX ADMIN — LACOSAMIDE 100 MG: 100 TABLET, FILM COATED ORAL at 20:39

## 2018-04-18 RX ADMIN — METRONIDAZOLE 500 MG: 500 TABLET, FILM COATED ORAL at 20:40

## 2018-04-18 RX ADMIN — CLOPIDOGREL 75 MG: 75 TABLET, FILM COATED ORAL at 08:41

## 2018-04-18 RX ADMIN — OXYCODONE HYDROCHLORIDE AND ACETAMINOPHEN 500 MG: 500 TABLET ORAL at 08:59

## 2018-04-18 RX ADMIN — FUROSEMIDE 20 MG: 20 TABLET ORAL at 08:40

## 2018-04-18 RX ADMIN — PYRIDOXINE HCL TAB 50 MG 50 MG: 50 TAB at 08:42

## 2018-04-18 RX ADMIN — LEVETIRACETAM 1000 MG: 500 TABLET, FILM COATED ORAL at 08:40

## 2018-04-18 RX ADMIN — CETIRIZINE HYDROCHLORIDE 5 MG: 10 TABLET, FILM COATED ORAL at 08:42

## 2018-04-18 RX ADMIN — HYDRALAZINE HYDROCHLORIDE 10 MG: 10 TABLET, FILM COATED ORAL at 20:40

## 2018-04-18 RX ADMIN — VITAMIN D, TAB 1000IU (100/BT) 1000 UNITS: 25 TAB at 08:41

## 2018-04-18 RX ADMIN — OXYCODONE HYDROCHLORIDE AND ACETAMINOPHEN 1 TABLET: 5; 325 TABLET ORAL at 11:11

## 2018-04-18 RX ADMIN — CEFTRIAXONE SODIUM 2 G: 2 INJECTION, SOLUTION INTRAVENOUS at 17:26

## 2018-04-18 RX ADMIN — MONTELUKAST 10 MG: 10 TABLET, FILM COATED ORAL at 08:40

## 2018-04-18 RX ADMIN — FOLIC ACID 1 MG: 1 TABLET ORAL at 08:40

## 2018-04-18 RX ADMIN — PANTOPRAZOLE SODIUM 40 MG: 40 TABLET, DELAYED RELEASE ORAL at 06:12

## 2018-04-18 NOTE — PROGRESS NOTES
Adult Nutrition  Assessment/PES    Patient Name:  Herlinda Ta  YOB: 1940  MRN: [id number]  Admit Date:  4/11/2018    Assessment Date:  4/18/2018          Adult Nutrition Assessment     Row Name 04/18/18 1200       Reason for Assessment    Reason For Assessment follow-up protocol       Nutrition/Diet History    Typical Food/Fluid Intake intake/appetite good, >75%       Labs/Procedures/Meds    Lab Results Reviewed reviewed    Lab Results Comments Glu        Diagnostic Tests/Procedures    Diagnostic Test/Procedure Reviewed reviewed       Medications    Pertinent Medications Reviewed reviewed    Pertinent Medications Comments abx, diuretic, anticoag, mvi/min, vit c, ppi       Physical Findings    Skin non-healing wound(s)   R groin wound - VAC in place       Nutrition Prescription PO    Current PO Diet Regular    Fluid Consistency Thin    Common Modifiers Consistent Carbohydrate       PO Evaluation    Number of Days PO Intake Evaluated 2 days    % PO Intake %        Problem/Interventions:          Intervention Goal     Row Name 04/18/18 1233       Intervention Goal    General Maintain nutrition    PO Maintain intake            Nutrition Intervention     Row Name 04/18/18 1233       Nutrition Intervention    RD/Tech Action Follow Tx progress;Care plan reviewd;Menu provided              Education/Evaluation     Row Name 04/18/18 1235       Monitor/Evaluation    Monitor Per protocol        Electronically signed by:  Shira Cruz RD  04/18/18 12:36 PM

## 2018-04-18 NOTE — THERAPY TREATMENT NOTE
Inpatient Rehabilitation - Occupational Therapy Treatment Note    [facility]     Patient Name: Herlinda Ta  : 1940  MRN: [id number]    Today's Date: 2018                 Admit Date: 2018      Visit Dx:    ICD-10-CM ICD-9-CM   1. 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   • Cerebral vasospasm   • Cervical pain (neck)   • Depressed   • Depression   • Dyspepsia   • Acid reflux   • Groin hematoma   • HTN, goal below 140/90   • HLD (hyperlipidemia)   • Infection of artery   • Iron deficiency anemia   • MRSA (methicillin resistant staph aureus) culture positive   • Rheumatoid arthritis   • Urinary incontinence, mixed   • Subarachnoid hemorrhage         Therapy Treatment          IRF Treatment Summary     Row Name 18 0852             Evaluation/Treatment Time and Intent    Document Type therapy note (daily note)  -AF      Mode of Treatment occupational therapy  -AF      Patient/Family Observations pt stated she slept better last night  -AF      Recorded by [AF] ANNIE Espinoza      Row Name 18 0852             Cognition/Psychosocial- PT/OT    Orientation Status (Cognition) oriented to;person;place  -AF      Follows Commands (Cognition) follows one step commands;75-90% accuracy  -AF      Safety Deficit (Cognitive) mild deficit  -AF      Recorded by [AF] ANNIE Espinoza      Row Name 18 0852             Transfer Assessment/Treatment    Sit-Stand [place] (Transfers) contact guard;verbal cues  -AF      Stand-Sit [place] (Transfers) verbal cues;contact guard  -AF      [place] Level (Toilet Transfer) contact guard;verbal cues  -AF      Assistive Device (Toilet Transfer) commode;grab bars/safety frame;walker,  front-wheeled  -AF      [place] Level (Shower Transfer) contact guard;verbal cues  -AF      Assistive Device (Shower Transfer) shower chair;walker, front-wheeled  -AF      Recorded by [AF] Keren Lowe R      Row Name 04/18/18 0852             Sit-Stand Transfer    Assistive Device (Sit-Stand Transfers) walker, front-wheeled  -AF      Recorded by [AF] Keren Lowe R      Row Name 04/18/18 0852             Stand-Sit Transfer    Assistive Device (Stand-Sit Transfers) walker, front-wheeled  -AF      Recorded by [AF] Keren Lowe R      Row Name 04/18/18 0852             Toilet Transfer    Type (Toilet Transfer) stand pivot/stand step  -AF      Recorded by [AF] Keren Lowe [facility]      Row Name 04/18/18 0852             Shower Transfer    Type (Shower Transfer) stand pivot/stand step  -AF      Recorded by [AF] Keren Lowe [facility]      Row Name 04/18/18 0852             Safety Issues, Functional Mobility    Comment, Safety Issues/Impairments (Mobility) pt walked in room with RWX with CGA  -AF      Recorded by [AF] Keren Lowe R      Row Name 04/18/18 0852             Bathing Assessment/Treatment    Bathing [place] Level bathing skills;contact guard assist;verbal cues  -AF      Assistive Device (Bathing) grab bar/tub rail;shower chair;hand held shower spray hose  -AF      Bathing Position unsupported sitting;supported standing  -AF      Recorded by [AF] Keren Lowe R      Row Name 04/18/18 0852             Upper Body Dressing Assessment/Treatment    Upper Body Dressing Task upper body dressing skills;set up assistance  -AF      Upper Body Dressing Position unsupported sitting  -AF      Recorded by [AF] Keren Lowe R      Row Name 04/18/18 0852             Lower Body Dressing Assessment/Treatment    Lower Body Dressing [place] Level doff;don;pants/bottoms;shoes/slippers;socks;underwear;verbal cues;contact guard assist  -AF      Lower Body Dressing Position  supported standing;unsupported sitting  -AF      Comment (Lower Body Dressing) pt able to cross legs and start brief and pants and then able to don socks   -AF      Recorded by [AF] ANNIE Espinoza      Row Name 04/18/18 0852             Grooming Assessment/Treatment    Grooming [place] Level grooming skills;contact guard assist;standby assist  -AF      Grooming Position unsupported standing  -AF      Recorded by [AF] ANNIE Espinoza      Row Name 04/18/18 0852             Toileting Assessment/Treatment    Toileting [place] Level toileting skills;verbal cues;contact guard assist  -AF      Assistive Device Use (Toileting) grab bar/safety frame;raised toilet seat  -AF      Toileting Position unsupported sitting;supported standing  -AF      Recorded by [AF] ANNIE Espinoza      Row Name 04/18/18 0852             Pain Scale: Numbers Pre/Post-Treatment    Pain Scale: Numbers, Pretreatment 0/10 - no pain  -AF      Recorded by [AF] ANNIE Espinoza      Row Name 04/18/18 0852             Static Sitting Balance    Level of [place] (Unsupported Sitting, Static Balance) supervision  -AF      Recorded by [AF] ANNIE Espinoza      Row Name 04/18/18 0852             Static Standing Balance    Level of [place] (Supported Standing, Static Balance) standby assist;contact guard assist  -AF      Time Able to Maintain Position (Supported Standing, Static Balance) 2 to 3 minutes  -AF      Assistive Device Utilized (Supported Standing, Static Balance) --   standing with grab bar  -AF      Recorded by [AF] ANNIE Espinoza      Row Name 04/18/18 0852             Positioning and Restraints    Pre-Treatment Position in bed  -AF      Post Treatment Position wheelchair  -AF      In Wheelchair sitting;call light within reach;encouraged to call for assist;exit alarm on  -AF      Recorded by [AF] ANNIE Espinoza        User Key  (r) = Recorded By, (t) = Taken By, (c) = Cosigned By     Initials Name Effective Dates    AF Keren Lowe, OTR 04/03/18 -           Wound Right groin (Active)   Dressing Appearance dry;intact 4/17/2018 10:30 PM   Closure [place]ULIO 4/17/2018 10:30 PM   Base dressing in place, unable to visualize 4/17/2018 11:18 AM   Drainage Characteristics/Odor serosanguineous 4/17/2018 10:30 PM   Drainage Amount moderate 4/17/2018 10:30 PM   Dressing Care, Wound other (see comments) 4/17/2018 11:18 AM       NPWT (Negative Pressure Wound Therapy) R groin (Active)   Therapy Setting continuous therapy 4/17/2018 10:30 PM   Pressure Setting 50 mmHg 4/17/2018 10:30 PM         OT Recommendation and Plan                       OT IRF GOALS     Row Name 04/12/18 1524             Transfer Goal 1 (OT-IRF)    Activity/Assistive Device (Transfer Goal 1, OT-IRF) toilet;shower chair;walk-in shower;tub  -SO      [place] Level (Transfer Goal 1, OT-IRF) supervision required;verbal cues required  -SO      Time Frame (Transfer Goal 1, OT-IRF) long term goal (LTG);2 weeks  -SO         LB Dressing Goal 1 (OT-IRF)    Activity/Device (LB Dressing Goal 1, OT-IRF) lower body dressing  -SO      [place] (LB Dressing Goal 1, OT-IRF) supervision required  -SO      Time Frame (LB Dressing Goal 1, OT-IRF) long term goal (LTG);2 weeks  -SO         Toileting Goal 1 (OT-IRF)    Activity/Device (Toileting Goal 1, OT-IRF) toileting skills, all;grab bar/safety frame  -SO      [place] Level (Toileting Goal 1, OT-IRF) supervision required  -SO      Time Frame (Toileting Goal 1, OT-IRF) long term goal (LTG);2 weeks  -SO         Strength Goal 1 (OT-IRF)    Strength Goal 1 (OT-IRF) Pt to increase overall BUE strength to 4-/5 to assist with functional activities and ADLs.  -SO      Time Frame (Strength Goal 1, OT-IRF) long term goal (LTG);2 weeks  -SO         Caregiver Training Goal 1 (OT-IRF)    Caregiver Training Goal 1 (OT-IRF) Pt and caregiver to be independent with AE, HEP, home safety, etc. at d/c.  -SO      Time  Frame (Caregiver Training Goal 1, OT-IRF) long term goal (LTG);2 weeks  -SO        User Key  (r) = Recorded By, (t) = Taken By, (c) = Cosigned By    Initials Name Provider Type    SO Margo Jacob OTSANGITA Occupational Therapist                 Time Calculation:           Time Calculation- OT     Row Name 04/18/18 0901             Time Calculation- OT    OT Start Time 0800  -AF      OT Stop Time 0845  -AF      OT Time Calculation (min) 45 min  -AF        User Key  (r) = Recorded By, (t) = Taken By, (c) = Cosigned By    Initials Name Provider Type    AF Keren Lowe OTSANGITA Occupational Therapist             Therapy Charges for Today     Code Description Service Date Service Provider Modifiers Qty    76809392402 HC OT SELF CARE/MGMT/TRAIN EA 15 MIN 4/17/2018 ANNIE Espinoza GO 2    82002656191 HC OT THERAPEUTIC ACT EA 15 MIN 4/17/2018 ANNIE Espinoza GO 1    30768397626 HC OT NEUROMUSC RE EDUCATION EA 15 MIN 4/17/2018 ANNIE Espinoza GO 1    74347645794 HC OT SELF CARE/MGMT/TRAIN EA 15 MIN 4/18/2018 ANNIE Espinoza GO 3                   ANNIE Espinoza  4/18/2018

## 2018-04-18 NOTE — THERAPY TREATMENT NOTE
Inpatient Rehabilitation - Physical Therapy Treatment Note  [facility]     Patient Name: Herlinda Ta  : 1940  MRN: [id number]    Today's Date: 2018                 Admit Date: 2018      Visit Dx:      ICD-10-CM ICD-9-CM   1. 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   • Cerebral vasospasm   • Cervical pain (neck)   • Depressed   • Depression   • Dyspepsia   • Acid reflux   • Groin hematoma   • HTN, goal below 140/90   • HLD (hyperlipidemia)   • Infection of artery   • Iron deficiency anemia   • MRSA (methicillin resistant staph aureus) culture positive   • Rheumatoid arthritis   • Urinary incontinence, mixed   • Subarachnoid hemorrhage       Therapy Treatment    Evaluation/Coping    Evaluation/Treatment Time and Intent  Document Type: therapy note (daily note) (18 0943 : Kera Cabrera PTA)  Mode of Treatment: physical therapy (18 0943 : Kera Cabrera PTA)  Patient/Family Observations: up in w/c. Agreed to PT (1843 : Kera Cabrera PTA)    Vitals/Pain/Safety    Pain Scale: Numbers Pre/Post-Treatment  Pain Scale: Numbers, Pretreatment: 0/10 - no pain (18 : Kera Cabrera PTA)  Positioning and Restraints  Post Treatment Position: wheelchair (18 : Kera Cabrera PTA)  In Wheelchair: sitting, with SLP (18 0943 : Kera Cabrera PTA)    Cognition/Communication    Cognition/Psychosocial- PT/OT  Safety Deficit (Cognitive):  (wound vac, contact precautions) (18 : Kera Cabrera PTA)    Oral Motor/Eating         Mobility/Basic Activities/Instrumental Activities/Motor/Modality    Bed Mobility Assessment/Treatment  Rolling Left [place] (Bed Mobility):  supervision, verbal cues (04/18/18 0943 : Kera Cabrera PTA)  Rolling Right [place] (Bed Mobility): supervision, verbal cues (04/18/18 0943 : Kera Cabrera PTA)  Supine-Sit [place] (Bed Mobility): supervision (04/18/18 0943 : Kera Cabrera PTA)  Sit-Supine [place] (Bed Mobility): supervision (04/18/18 0943 : Kera Cabrera PTA)  Comment (Bed Mobility): assessed on txment mat (04/18/18 0943 : Kera Carbera PTA)  Bed-Chair Transfer  Bed-Chair [place] (Transfers): stand by assist (04/18/18 0943 : Kera Cabrera PTA)  Chair-Bed Transfer  Chair-Bed [place] (Transfers): stand by assist (04/18/18 0943 : Kera Cabrera PTA)  Sit-Stand Transfer  Sit-Stand [place] (Transfers): stand by assist (04/18/18 0943 : Kera Cabrera PTA)  Assistive Device (Sit-Stand Transfers): walker, front-wheeled (04/18/18 0943 : Kera Cabrera PTA)  Stand-Sit Transfer  Stand-Sit [place] (Transfers): stand by assist (04/18/18 0943 : Kera Cabrera PTA)  Assistive Device (Stand-Sit Transfers): walker, front-wheeled (04/18/18 0943 : Kera Cabrera PTA)  Car Transfer  Type (Car Transfer): sit-stand, stand-sit (04/18/18 0943 : Kera Cabrera PTA)  [place] Level (Car Transfer): stand by assist, verbal cues (04/18/18 0943 : Kera Cabrera PTA)  Assistive Device (Car Transfer): walker, front-wheeled (04/18/18 0943 : Kera Cabrera PTA)  Gait/Stairs Assessment/Training  [place] Level (Gait): contact guard, stand by assist (04/18/18 0943 : Kera Cabrera PTA)  Assistive Device (Gait): walker, front-wheeled (04/18/18 0943 : Kera Cabrera PTA)  Distance in Feet (Gait): 340 (04/18/18 0943 : Kera Cabrera, PTA)  Pattern (Gait): step-through (04/18/18 0943 : Kera Cabrera, HUMA)  Deviations/Abnormal Patterns (Gait):  (cues to scan to the right) (04/18/18 0943 : Kera Cabrera, HUMA)  [place] Level (Stairs): contact guard (04/18/18 0943  : Kera Cabrera PTA)  Handrail Location (Stairs): right side (ascending) (04/18/18 0943 : Kera Cabrera PTA)  Number of Steps (Stairs): 12 (04/18/18 0943 : Kera Cabrera PTA)  Ascending Technique (Stairs): step-over-step (04/18/18 0943 : Kera Cabrera PTA)  Descending Technique (Stairs): step-to-step (04/18/18 0943 : Kear Cabrera PTA)              ROM/MMT                   Sensory/Myotome/Dermatome/Edema               Posture/Balance/Special Tests/Exercise/Transportation/Sexual Function    Static Standing Balance  Level of [place] (Unsupported Standing, Static Balance): contact guard assist, minimal assist, 75% patient effort (04/18/18 0943 : Kera Cabrera PTA)  Time Able to Maintain Position (Unsupported Standing, Static Balance): 1 to 2 minutes (04/18/18 0943 : Kera Cabrera PTA)  Comment (Unsupported Standing, Static Balance): on a foam 1/2 roll, and also on dense foam pad. (04/18/18 0943 : Kera Cabrera PTA)  Dynamic Balance Activity  Therapeutic Training Performed (Dynamic Balance): obstacle course (stepping over objects, picking object up from floor. CGA, vc) (04/18/18 0943 : Kera Cabrera PTA)  Support Needed for Balance (Dynamic Balance Training):  (Rwx) (04/18/18 0943 : Kera Cabrera PTA)  Comment (Dynamic Balance Training): amb on uneven surface x 12 ft, rwx, cga (04/18/18 0943 : Kera Cabrera PTA)    Lower Extremity Standing Therapeutic Exercise  Performed, Standing Lower Extremity (Therapeutic Exercise): mini-squats, heel raises (04/18/18 0943 : Kera Cabrera PTA)  Exercise Type, Standing Lower Extremity (Therapeutic Exercise): AROM (active range of motion) (04/18/18 0943 : Kera Cabrera PTA)  Sets/Reps Detail, Standing Lower Extremity (Therapeutic Exercise): 1/20 (04/18/18 0943 : Kera A Brutscher, PTA)         Orthotics/Residual Limb/Prosthetic Management              Outcome Summary                 PT Recommendation and Plan                          PT IRF GOALS     Row Name 04/12/18 0900             Bed Mobility Goal 1 (PT-IRF)    Activity/Assistive Device (Bed Mobility Goal 1, PT-IRF) bed mobility activities, all  -EE      [place] Level (Bed Mobility Goal 1, PT-IRF) independent  -EE      Time Frame (Bed Mobility Goal 1, PT-IRF) long term goal (LTG);2 weeks  -EE      Progress/Outcomes (Bed Mobility Goal 1, PT-IRF) goal ongoing  -EE         Transfer Goal 1 (PT-IRF)    Activity/Assistive Device (Transfer Goal 1, PT-IRF) sit-to-stand/stand-to-sit;bed-to-chair/chair-to-bed   with A.A.D.  -EE      [place] Level (Transfer Goal 1, PT-IRF) conditional independence  -EE      Time Frame (Transfer Goal 1, PT-IRF) long term goal (LTG);2 weeks  -EE      Progress/Outcomes (Transfer Goal 1, PT-IRF) goal ongoing  -EE         Transfer Goal 2 (PT-IRF)    Activity/Assistive Device (Transfer Goal 2, PT-IRF) car transfer  -EE      [place] Level (Transfer Goal 2, PT-IRF) supervision required  -EE      Time Frame (Transfer Goal 2, PT-IRF) long term goal (LTG);2 weeks  -EE      Progress/Outcomes (Transfer Goal 2, PT-IRF) goal ongoing  -EE         Gait/Walking Locomotion Goal 1 (PT-IRF)    Activity/Assistive Device (Gait/Walking Locomotion Goal 1, PT-IRF) gait (walking locomotion)   with A.A.D.  -EE      Gait/Walking Locomotion Distance Goal 1 (PT-IRF) 160  -EE      [place] Level (Gait/Walking Locomotion Goal 1, PT-IRF) supervision required  -EE      Time Frame (Gait/Walking Locomotion Goal 1, PT-IRF) long term goal (LTG);2 weeks  -EE      Progress/Outcomes (Gait/Walking Locomotion Goal 1, PT-IRF) goal ongoing  -EE         Stairs Goal 1 (PT-IRF)    Activity/Assistive Device (Stairs Goal 1, PT-IRF) ascending stairs;descending stairs;using handrail, left;using handrail, right  -EE      Number of Stairs (Stairs Goal 1, PT-IRF) 12  -EE      [place] Level (Stairs Goal 1, PT-IRF) supervision required  -EE      Time Frame (Stairs Goal 1, PT-IRF)  long term goal (LTG);2 weeks  -EE      Progress/Outcomes (Stairs Goal 1, PT-IRF) goal ongoing  -EE        User Key  (r) = Recorded By, (t) = Taken By, (c) = Cosigned By    Initials Name Provider Type    EE Enma Bernstein, PT Physical Therapist                   Time Calculation:           PT Charges     Row Name 04/18/18 1501 04/18/18 0942          Time Calculation    Start Time 1430  -LB 0930  -LB     Stop Time 1500  -LB 1000  -LB     Time Calculation (min) 30 min  -LB 30 min  -LB       User Key  (r) = Recorded By, (t) = Taken By, (c) = Cosigned By    Initials Name Provider Type    LB Kera Cabrera PTA Physical Therapy Assistant            Therapy Charges for Today     Code Description Service Date Service Provider Modifiers Qty    43517445784 HC PT THER PROC EA 15 MIN 4/17/2018 Kera Cabrera PTA GP 4    79869870568 HC PT THER PROC EA 15 MIN 4/18/2018 Kera Cabrera PTA GP 4    25078126503 HC PT CARE PLAN EACH 15 MIN 4/18/2018 Kera Cabrera PTA GP 2                   Kera Cabrera PTA  4/18/2018

## 2018-04-18 NOTE — PROGRESS NOTES
Inpatient Rehabilitation Functional Measures Assessment    Functional Measures  BERONICA Eating:  [facility] Grooming: [facility] Bathing:  [facility] Upper Body Dressing:  [facility] Lower Body Dressing:  [facility] Toileting:  [facility] Bladder Management  Level of Assistance:  [place]  Frequency/Number of Accidents this Shift:  [facility] Bowel Management  Level of Assistance: [place]  Frequency/Number of Accidents this Shift: [facility] Bed/Chair/Wheelchair Transfer:  [facility] Toilet Transfer:  [facility] Tub/Shower Transfer:  [place]    Previously Documented Mode of Locomotion at Discharge: Field  BERONICA Expected Mode of Locomotion at Discharge: [facility] Walk/Wheelchair:  [facility] Stairs:  [facility] Comprehension:  Auditory comprehension is the usual mode. Comprehension  Score = 6, Modified [place].  Patient comprehends complex/abstract  information in their primary language with only mild difficulty.  BERONICA Expression:  Vocal expression is the usual mode. Expression Score = 6,  Modified Independent.  Patient expresses complex/abstract information in their  primary language with only mild difficulty with tasks.  BERONICA Social Interaction:  Social Interaction Score = 6, Modified Independent.  Patient is modified independent for social interaction, requiring: Medications.    BERONICA Problem Solving:  Activity was not observed.  BERONICA Memory:  Memory Score = 6, Modified [place].  Patient is modified  independent for memory, having only mild difficulty and using self-initiated or  environmental cues to remember.    Therapy Mode Minutes  Occupational Therapy: [place]  Physical Therapy: [facility]  Speech Language Pathology:  [facility]    Signed by: Ivone Gamez RN

## 2018-04-18 NOTE — PROGRESS NOTES
LOS: 7 days   Patient Care Team:  tSanislaw Esposito MD as PCP - General     Chief Complaint:   SAH, opthalmic artery aneurysm s/p endovascular embolization.   Right Groin hematoma, right groin wound infection s/p wound vac placement.   HTN        Subjective     History of Present Illness    Subjective  Doing well this am. No new complaints. Feels as though she is more alert this am. Denies pain, SOB dizziness or constipation. She feels her strength is improved in the right lower extremity.  She did have a low BP reading this am though was asymptomatic.     History taken from: patient    Objective     Vital Signs  Temp:  [97.4 °F (36.3 °C)-98.6 °F (37 °C)] 97.8 °F (36.6 °C)  Heart Rate:  [70-92] 70  Resp:  [16-20] 18  BP: ()/(58-76) 105/69    Objective  MENTAL STATUS-awake, alert  HEENT - NCAT   LUNGS -CTA  HEART -RRR  ABD - NABS, soft, NT  EXT - VAC RIGHT GROIN  NEURO - takes resistance bilateral upper extremities and lower extremities      Results Review:     I reviewed the patient's new clinical results.    Results from last 7 days  Lab Units 04/16/18  0507 04/12/18  0905   WBC 10*3/mm3 5.50 5.23   HEMOGLOBIN g/dL 9.3* 9.3*   HEMATOCRIT % 29.3* 28.3*   PLATELETS 10*3/mm3 179 181         Results from last 7 days  Lab Units 04/16/18  0507 04/12/18  0905   SODIUM mmol/L 137 135*   POTASSIUM mmol/L 3.7 3.6   CHLORIDE mmol/L 102 100   CO2 mmol/L 22.3 20.5*   BUN mg/dL 20 20   CREATININE mg/dL 0.89 1.04*   CALCIUM mg/dL 8.6 8.4*   BILIRUBIN mg/dL 0.8 0.8   ALK PHOS U/L 51 60   ALT (SGPT) U/L 19 29   AST (SGOT) U/L 33* 20   GLUCOSE mg/dL 89 104*       Results from last 7 days  Lab Units 04/16/18  0507   CRP mg/dL 5.77*       Results from last 7 days  Lab Units 04/16/18  0507   SED RATE mm/hr 36*         Medication Review: DONE  Scheduled Meds:    aspirin 325 mg Oral Daily   ceftriaxone 2 g Intravenous Q24H   cetirizine 5 mg Oral Daily   cholecalciferol 1,000 Units Oral Daily   clopidogrel 75 mg Oral Daily    escitalopram 20 mg Oral Daily   folic acid 1 mg Oral Daily   furosemide 20 mg Oral Daily   hydrALAZINE 25 mg Oral Q8H   lacosamide 100 mg Oral Q12H   levETIRAcetam 1,000 mg Oral BID   metoprolol succinate XL 50 mg Oral Q24H   metroNIDAZOLE 500 mg Oral Q8H   montelukast 10 mg Oral Daily   multivitamin 1 tablet Oral Daily   pantoprazole 40 mg Oral Q AM   vitamin B-6 50 mg Oral Daily   vitamin C 500 mg Oral Daily     Continuous Infusions:   PRN Meds:.•  albuterol  •  EPINEPHrine  •  hydrALAZINE  •  oxyCODONE-acetaminophen **OR** oxyCODONE-acetaminophen  •  triamcinolone      Assessment/Plan     Active Problems:    Subarachnoid hemorrhage      Assessment & Plan   SAH, opthalmic artery aneurysm s/p endovascular embolization.   Aspirin / Plavix  Decadron taper  GI prophylaxis - Protonix  Seizure - Vimpat / Keppra  HTN - SBP goal 120-180, s/p vasospams - Patient's SBP goal is 120-180 per Neurosurgery.  April 12 - SBP was averaging 140's and ranged 120's to 180's recently at [facility].  She was on Hydralazine 25 mg q  8 hours scheduled and Hydralazine 10 mg IV q 4 hours prn SBP >180 or DBP > 100 with last dose 4-11-18 at 07:35 there.  At discharge she was restarted on Lasix 20 mg daily, Metoprolol 100 mg daily and Doxazosin 2 mg HS , which she was not on there.  SBP was 168 last PM and 118 this AM.  Will continue Hydralazine scheduled and po prn and Lasix, but hold additional Metoprolol and Doxazosin for now to avoid excessive BP reduction s/p aneurysm stent and s/p vasospasm. She completed Nimotop April 11.   April 13 -  - resume Metoprolol succinate at lower dose of 25 mg daily  April 16 - BP elevated last PM SBP 180s - given hydralazine prn, increased Metoprolol to 50 mg daily; may titrate up on hydralazine, follow pattern.  April 17 - She refused hydralazine night before last. Metoprolol increased yesterday. - yesterday. Hydralazine held last PM and this afternoon again as . Continue to follow  pattern.   April 18- continue scheduled BP medication reduced metoprolol to 25 mg XL daily. DC accuchecks      Right Groin hematoma, right groin wound infection s/p wound vac placement.   ID - Rocephin / Metronidazole x 4 weeks - Clarified with [facility] pharmacy - Ceftriazone started 4-7 and Metronidazole started 4-11 - will do stop date May 9.April 16 - weekly labs reviewed. Results to ID.     DVT prophylaxis - SCDs         TEAM CONF - April 13 - BALANCE AND PROPRIOCEPTIVE DEFICITS, SOMETIMES RUN INTO OBJECTS TO RIGHT, CORRECT WITH CUING. TRANSFERS CTG. GAIT 160 FEET RW CTG/MIN ASSIST. WOUND VAC RIGHT GROIN. BATH MIN. LBD MOD, UBD MIN ASSIST. COGNITION - SLOW PROCESSING AND MILD DIFFICULTY WITH EXECUTIVE FUNCTION. CONTINENT BOWEL AND BLADDER.  ELOS- ONE WEEK    Grabiel Fontenot MD  04/18/18  3:08 PM    Time:

## 2018-04-18 NOTE — PROGRESS NOTES
Inpatient Rehabilitation Functional Measures Assessment and Plan of Care    Plan of Care  Updated Problems/Interventions  Field    Functional Measures  BERONICA Eating:  [facility] Grooming: [facility] Bathing:  [facility] Upper Body Dressing:  [facility] Lower Body Dressing:  [facility] Toileting:  [facility] Bladder Management  Level of Assistance:  [place]  Frequency/Number of Accidents this Shift:  [facility] Bowel Management  Level of Assistance: [place]  Frequency/Number of Accidents this Shift: [facility] Bed/Chair/Wheelchair Transfer:  [facility] Toilet Transfer:  [facility] Tub/Shower Transfer:  [place]    Previously Documented Mode of Locomotion at Discharge: Field  BERONICA Expected Mode of Locomotion at Discharge: [facility] Walk/Wheelchair:  [facility] Stairs:  [facility] Comprehension:  [facility] Expression:  [facility] Social Interaction:  [facility] Problem Solving:  [facility] Memory:  [place]    Therapy Mode Minutes  Occupational Therapy: Individual: 75 minutes.  Physical Therapy: [place]  Speech Language Pathology:  [place]    Signed by: ANNIE Chadwick/YUSEF

## 2018-04-18 NOTE — THERAPY TREATMENT NOTE
Inpatient Rehabilitation - Speech Language Pathology Treatment Note    [facility]       Patient Name: Herlinda Ta  : 1940  MRN: [id number]    Today's Date: 2018           Admit Date: 2018      Visit Dx:        ICD-10-CM ICD-9-CM   1. 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   • Cerebral vasospasm   • Cervical pain (neck)   • Depressed   • Depression   • Dyspepsia   • Acid reflux   • Groin hematoma   • HTN, goal below 140/90   • HLD (hyperlipidemia)   • Infection of artery   • Iron deficiency anemia   • MRSA (methicillin resistant staph aureus) culture positive   • Rheumatoid arthritis   • Urinary incontinence, mixed   • Subarachnoid hemorrhage          Therapy Treatment    Evaluation/Coping    Evaluation/Treatment Time and Intent  Document Type: therapy note (daily note) (18 1500 : Katherine L Bruton)  Mode of Treatment: speech-language pathology, individual therapy (18 1500 : Katherine L Bruton)  Patient/Family Observations: Pt alert and cooperative. Son and daughter in law present to observe session.  (18 1500 : Katherine L Bruton)  Start Time (Evaluation/Treatment): 1500 (18 1500 : Katherine L Bruton)  Stop Time (Evaluation/Treatment): 1530 (18 1500 : Katherine L Bruton)    Vitals/Pain/Safety    Pain Scale: Numbers Pre/Post-Treatment  Pain Scale: Numbers, Pretreatment: 0/10 - no pain (18 1500 : Katherine L Bruton)    Cognition/Communication         Oral Motor/Eating         Mobility/Basic Activities/Instrumental Activities/Motor/Modality                   ROM/MMT                   Sensory/Myotome/Dermatome/Edema               Posture/Balance/Special Tests/Exercise/Transportation/Sexual Function                    Orthotics/Residual Limb/Prosthetic Management              Outcome Summary         EDUCATION    The patient has been educated in the following areas:     Cognitive Impairment.    SLP Recommendation and Plan                                                                SLP GOALS     Row Name 04/18/18 1500 04/18/18 1000 04/17/18 1300       Memory Skills Goal 1 (SLP)    Improve Memory Skills Through Goal 1 (SLP) recalling unrelated word lists immediately   4-words urelated  -KB  -- recalling related word lists with an imposed delay   recall list of 4 words in same category  -KB    Progress (Memory Skills Goal 1, SLP) 60%;independently (over 90% accuracy);90%;with moderate cues (50-74%)  -KB  -- 60%;independently (over 90% accuracy);100%;with moderate cues (50-74%)  -KB    Progress/Outcomes (Memory Skills Goal 1, SLP) goal ongoing  -KB  -- continuing progress toward goal;goal ongoing  -KB       Memory Skills Goal (SLP)    Improve Memory Skills Through Goal (SLP) read paragraph and answer questions: short functional meliza  -KB  --  --    Progress (Memory Skills Goal, SLP) 70%  -KB  --  --    Progress/Outcomes (Memory Skills Goal, SLP) goal ongoing  -KB  --  --       Organizational Skills Goal 2 (SLP)    Improve Thought Organization Through Goal 2 (SLP) completing a divergent naming task   completion of 4x4 matrix given category and initial letter  -KB  -- completing a divergent naming task   abstract-expensive items; concrete-vegetables  -KB    Progress (Thought Organization Skills Goal 2, SLP) 80%;independently (over 90% accuracy);100%;with minimal cues (75-90%)  -KB  -- other (comment)   4 items in each category in 1 minute  -KB    Progress/Outcomes (Thought Organization Skills Goal 2, SLP) good progress toward goal;goal ongoing  -KB  -- goal ongoing  -KB    Comment (Thought Organization Skills Goal 2, SLP)  --  -- Able to name up to 3 more items in a concrete category given additional time and verbal cue  -KB        Organizational Skills Goal (SLP)    Improve Thought Organization Through Goal (SLP)  -- functional pill sorting task  -KB sorting items into 4 concrete categories  -KB    Progress (Thought Organization Skills Goal, SLP)  -- with moderate cues (50-74%)   5 cues needed to complete 5 items  -KB 60%;independently (over 90% accuracy);100%;with moderate cues (50-74%)  -KB    Progress/Outcomes (Thought Organization Skills Goal, SLP)  -- goal ongoing  -KB goal ongoing  -KB    Comment (Thought Organization Skills Goal, SLP)  -- Pt required MOD verbal and visual cues to complete task with 100% accuracy. Errors were made in finding the correct medication and placement in pill organizer.   -KB Performance declined since previous session-cause unknown.   -KB       Reasoning Goal 1 (SLP)    Improve Reasoning Through Goal 1 (SLP)  --  -- complete deductive reasoning task   category deduction given four items  -KB    Progress (Reasoning Goal 1, SLP)  --  -- 70%;independently (over 90% accuracy);100%;with minimal cues (75-90%)  -KB    Progress/Outcomes (Reasoning Goal 1, SLP)  --  -- goal ongoing  -KB       Executive Functional Skills Goal 1 (SLP)    Improve Executive Function Skills Goal 1 (SLP)  -- exhibit cognitive flexibility   stating similarities and differences  -KB  --    Progress (Executive Function Skills Goal 1, SLP)  -- 40%;independently (over 90% accuracy);100%;with moderate cues (50-74%)  -KB  --    Progress/Outcomes (Executive Function Skills Goal 1, SLP)  -- goal ongoing  -KB  --    Row Name 04/17/18 1000 04/16/18 1500 04/16/18 1300       Memory Skills Goal 1 (SLP)    Improve Memory Skills Through Goal 1 (SLP) recalling unrelated word lists immediately   4-word list   -KB  -- recalling related word lists with an imposed delay  -KB    Progress (Memory Skills Goal 1, SLP) 30%;independently (over 90% accuracy);90%;with moderate cues (50-74%)  -KB  -- 40%   MIN-MOD verbal cues needed consistently  -KB     "Progress/Outcomes (Memory Skills Goal 1, SLP) progress slower than expected  -KB  -- goal ongoing  -KB       Memory Skills Goal (SLP)    Improve Memory Skills Through Goal (SLP) visual memory task   CT RadhapDain L2  -KB visual memory task   CT Slap Dain L2  -KB  --    Progress (Memory Skills Goal, SLP) 70%;with minimal cues (75-90%)  -KB 30%;independently (over 90% accuracy)  -KB  --    Progress/Outcomes (Memory Skills Goal, SLP) goal ongoing  -KB goal ongoing  -KB  --    Comment (Memory Skills Goal, SLP)  -- Pt required cues for sustained attention and memory for stimuli. Dificulty maintaining alertness during this task, abandoned before completing d/t lack of alertness.   -KB  --       Organizational Skills Goal (SLP)    Improve Thought Organization Through Goal (SLP)  --  -- sorting items into 4 concrete categories  -KB    Progress (Thought Organization Skills Goal, SLP)  --  -- 100%;independently (over 90% accuracy)  -KB    Progress/Outcomes (Thought Organization Skills Goal, SLP)  --  -- goal ongoing  -KB       Reasoning Goal 1 (SLP)    Improve Reasoning Through Goal 1 (SLP)  --  -- complete deductive reasoning task   category deduction given 4 items  -KB    Progress (Reasoning Goal 1, SLP)  --  -- 70%;independently (over 90% accuracy)   MIN verbal cue to correctly label \"office supplies\" category  -KB    Progress/Outcomes (Reasoning Goal 1, SLP)  --  -- good progress toward goal;goal ongoing  -KB    Comment (Reasoning Goal 1, SLP)  --  -- Pt named categories given items belonging in each.   -KB       Reasoning Goal (SLP)    Improve Reasoning Through Goal (SLP) diagnostic treatment: mod-complex word puzzle  -KB  --  --    Progress (Reasoning Goal, SLP) 60%;independently (over 90% accuracy);100%;with moderate cues (50-74%)  -KB  --  --    Progress/Outcomes (Reasoning Goal, SLP) goal ongoing  -KB  --  --       Executive Functional Skills Goal 1 (SLP)    Improve Executive Function Skills Goal 1 (SLP)  -- exhibit " cognitive flexibility   providing two definitions for words  -KB  --    Progress (Executive Function Skills Goal 1, SLP)  -- 60%;independently (over 90% accuracy);90%;with moderate cues (50-74%)  -KB  --    Progress/Outcomes (Executive Function Skills Goal 1, SLP)  -- goal ongoing  -KB  --    Row Name 04/16/18 1000             Memory Skills Goal 2 (SLP)    Improve Memory Skills Through Goal 2 (SLP) recall details from a word list   recall word from list by word placement  -KB      Progress (Memory Skills Goal 2, SLP) 0%  -KB      Progress/Outcomes (Memory Skills Goal 2, SLP) goal ongoing  -KB      Comment (Memory Skills Goal 2, SLP) Pt not able to recall detail from a list despite cues, transitioned task into only recall of list of 4 words.   -KB         Memory Skills Goal (SLP)    Improve Memory Skills Through Goal (SLP) Immediate recall of list of 4 words  -KB      Progress (Memory Skills Goal, SLP) 30%;independently (over 90% accuracy)  -KB      Progress/Outcomes (Memory Skills Goal, SLP) goal ongoing  -KB      Comment (Memory Skills Goal, SLP) Pt required consistent repetitions and additional MAX cues to recall lists of 4 words. Some awareness of deficit noted, but pt did not self-correct.   -KB         Organizational Skills Goal (SLP)    Improve Thought Organization Through Goal (SLP) completing word matrix given category and initial letter  -KB      Time Frame (Thought Organization Skills Goal, SLP) by discharge  -KB      Progress (Thought Organization Skills Goal, SLP) 30%;independently (over 90% accuracy)   consistent MIN-MOD verbal cues required for word generation  -KB      Progress/Outcomes (Thought Organization Skills Goal, SLP) goal ongoing  -KB      Comment (Thought Organization Skills Goal, SLP) Provided similar task for pt to work on in her room.   -KB        User Key  (r) = Recorded By, (t) = Taken By, (c) = Cosigned By    Initials Name Provider Type    KB Katherine L Bruton Speech and Language  Pathologist                    Time Calculation:             Time Calculation- SLP     Row Name 04/18/18 1530 04/18/18 1355 04/18/18 1028       Time Calculation- SLP    SLP Start Time 1500  -KB  -- 1000  -KB    SLP Stop Time 1530  -KB  -- 1030  -KB    SLP Time Calculation (min) 30 min  -KB  -- 30 min  -KB    SLP Non-Billable Time (min)  -- 45 min   family conference  -KB  --      User Key  (r) = Recorded By, (t) = Taken By, (c) = Cosigned By    Initials Name Provider Type     Katherine L Bruton Speech and Language Pathologist            Therapy Charges for Today     Code Description Service Date Service Provider Modifiers Qty    81837862385 HC ST DEV OF COGN SKILLS EACH 15 MIN 4/17/2018 Katherine L Bruton  4    [id number]  ST DEV OF COGN SKILLS EACH 15 MIN 4/18/2018 Katherine L Bruton  4                           Katherine L Bruton  4/18/2018

## 2018-04-18 NOTE — PLAN OF CARE
Problem: Patient Care Overview  Goal: Plan of Care Review  Outcome: Ongoing (interventions implemented as appropriate)   04/18/18 6792   Patient Care Overview   IRF Plan of Care Review progress ongoing, continue   Progress, Functional Goals demonstrating adequate progress   Coping/Psychosocial   Plan of Care Reviewed With patient;son;spouse   OTHER   Outcome Summary Pleasant and cooperative . No unsafe behavior. Ambulates with CGA of 1. Family confernce held today. DC planned for Friday. IVAB admin time changed today to accomodate progressing to home schedule time. Wound vac changed today.       Problem: Skin Injury Risk (Adult)  Goal: Skin Health and Integrity  Outcome: Ongoing (interventions implemented as appropriate)      Problem: Fall Risk (Adult)  Goal: Absence of Fall  Outcome: Ongoing (interventions implemented as appropriate)      Problem: Mobility, Physical Impaired (Adult)  Goal: Enhanced Mobility Skills  Outcome: Ongoing (interventions implemented as appropriate)    Goal: Enhanced Functional Ability  Outcome: Ongoing (interventions implemented as appropriate)

## 2018-04-18 NOTE — PROGRESS NOTES
Family conference held today with patient, , son (Lonnie) and daughter-in-law. Son, Raul, and daughter, Lia, were on speaker phone. PT, OT, ST, NSG, and SW present. Discussed progress and d/c recommendations.  PT reported patient requires supervision with bed mobility and sit to stand transfers with occasional verbal cues. Patient is walking with rolling walker with SBA. PT noted that patient needs cues to pay attention to right side of environment. Tends to run into things if not cued.   PT has tried walking patient with cane and without device but has had balance issues with both so recommended patient use rolling walker at home. Patient has gone up/down 12 steps with rail with CGA. Car transfers require CGA.   OT reported that patient is able to bathe seated on shower seat with SBA. Needs cues to stay seated. Patient was able to cross legs today to help with dressing LE. Patient able to dress UE with SBA. OT does not note any right neglect with ADL's but does note with walking into bathroom or paying attention to objects on right side of environment. OT will retest vision. Patient able to stand to do grooming tasks. OT has worked on standing endurance and noted that patient takes longer to process information at times, especially if fatigued.   ST reported patient able to do table top tasks without right neglect and has done okay with visual tasks such as using IPad. ST reported working on cognitive tasks. Patient does better with visual memory vs verbal memory. Also has some decreased reasoning and some trouble at times with language such as coming up with right word to say. ST has done pill sorting task with patient and did note some errors. Recommended patient have assistance with managing medications and finances. ST also recommended use of a daily planner to help with memory.   NSG discussed medications and gave list to  to review and compare to medications she has at home. NSG monitoring BP  and discussed holding medication if low. Recommended monitoring BP at home. Family concerned about BP issues as she was having this issue prior to hospitalization. PCP had been monitoring BP medications. Family contemplating changing PCP. NSG checking blood sugars due to steroid but to see if this can be d/c since not on steroids now and BS have been good. Discussed IV antibiotic and Flagyl to continue at home per ID doctor. Labs faxed to ID doctor. Patient scheduled to follow up with [facility] ID doctor on 4/25. Discussed follow up with surgeon, Dr. Boo and with PCP. Patient to go home with wound VAC. Discussed wound VAC to be changed on M, W, F by home health nurse. NSG also reported patient still has some bladder incontinence.   Discussed equipment for home. Patient has rolling walker and  has bought shower chair.   Recommended home NSG, PT, OT, ST. Referral has been made to [facility]. Hospital liaison checking on cost of IV antibiotic. Discussed changing dose time to afternoon vs nighttime. NSG has discussed with pharmacy and plan to change to afternoon time so patient can get IV antibiotic on Friday before going home and then home health nurse can see patient on Saturday to administer and teach family how to do IV antibiotic.   D/C plan is home with  on Friday, 4/20. Daughter coming to on Friday and will plan to stay with them for a week or longer if needed. Will assist with d/c plans.

## 2018-04-18 NOTE — PLAN OF CARE
Problem: Patient Care Overview  Goal: Plan of Care Review  Outcome: Ongoing (interventions implemented as appropriate)   04/17/18 1611 04/17/18 2230 04/18/18 0524   Patient Care Overview   IRF Plan of Care Review --  --  progress ongoing, continue   Progress, Functional Goals demonstrating adequate progress --  --    Coping/Psychosocial   Plan of Care Reviewed With --  patient --    OTHER   Outcome Summary --  --  No c/o pain or unsafe behavior. Wound vac intact R groin with moderate drainage in cannister. Ambulates CGA 1 with walker. Incont. bladder at times, wears pull-up pad. Continues on IVAB daily.     Goal: Individualization and Mutuality  Outcome: Ongoing (interventions implemented as appropriate)    Goal: Coping Plan  Outcome: Ongoing (interventions implemented as appropriate)      Problem: Skin Injury Risk (Adult)  Goal: Skin Health and Integrity  Outcome: Ongoing (interventions implemented as appropriate)      Problem: Fall Risk (Adult)  Goal: Absence of Fall  Outcome: Ongoing (interventions implemented as appropriate)      Problem: Mobility, Physical Impaired (Adult)  Goal: Enhanced Mobility Skills  Outcome: Ongoing (interventions implemented as appropriate)    Goal: Enhanced Functional Ability  Outcome: Ongoing (interventions implemented as appropriate)

## 2018-04-18 NOTE — NURSING NOTE
Spoke with wound nurse and Dr. prince about wound vac setting and what it should be set at. Wound vac at 75 per Dr. Prince and wound nurse instruction.

## 2018-04-18 NOTE — PROGRESS NOTES
Inpatient Rehabilitation Functional Measures Assessment and Plan of Care    Plan of Care  Updated Problems/Interventions  Field    Functional Measures  BERONICA Eating:  [facility] Grooming: [facility] Bathing:  [facility] Upper Body Dressing:  [facility] Lower Body Dressing:  [facility] Toileting:  [facility] Bladder Management  Level of Assistance:  [place]  Frequency/Number of Accidents this Shift:  [facility] Bowel Management  Level of Assistance: [place]  Frequency/Number of Accidents this Shift: [facility] Bed/Chair/Wheelchair Transfer:  Bed/chair/wheelchair Transfer Score = 5.  Patient is supervision/set-up for transferring to and from the  bed/chair/wheelchair, requiring: No assistive devices were required.  BERONICA Toilet Transfer:  [facility] Tub/Shower Transfer:  [place]    Previously Documented Mode of Locomotion at Discharge: Field  BERONICA Expected Mode of Locomotion at Discharge: [facility] Walk/Wheelchair:  WHEELCHAIR OBSERVATION   Activity was not observed.    WALK OBSERVATION   Walk Distance Scale = 3.  Distance walked is greater than 150 feet. Walk Score  = 4.  Patient performs 75% or more of effort and requires minimal assistance.  Incidental help/contact guard/steadying was provided. Patient walked a distance  of  340 feet. Patient requires the following assistive device(s): Rolling  walker.  BERONICA Stairs:  Stairs Score = 4.  Incidental help/contact guard/steadying was  provided. Patient performs 75% or more of effort and requires minimal  assistance. Patient negotiated 12 stairs. Patient requires the following  assistive device(s): Handrail(s).    BERONICA Comprehension:  [facility] Expression:  [facility] Social Interaction:  [facility] Problem Solving:  [facility] Memory:  [place]    Therapy Mode Minutes  Occupational Therapy: [place]  Physical Therapy: Individual: 60 minutes.  Speech Language Pathology:  [place]    Signed by: Kera Cabrera PTA

## 2018-04-18 NOTE — PROGRESS NOTES
Inpatient Rehabilitation Plan of Care Note    Plan of Care  Care Plan Reviewed - No updates at this time.    Safety    Performed Intervention(s)  Safety rounds; call light/items within easy reach  Bed Alarm      Sphincter Control    Performed Intervention(s)  Monitor I&O  Timed voids  Use incontinent products      Psychosocial    Performed Intervention(s)  Encourage to verbalize concerns  Offer diversional activities    Signed by: Ivone Gamez RN

## 2018-04-18 NOTE — THERAPY TREATMENT NOTE
Inpatient Rehabilitation - Occupational Therapy Treatment Note    [facility]     Patient Name: Herlinda Ta  : 1940  MRN: [id number]    Today's Date: 2018                 Admit Date: 2018      Visit Dx:    ICD-10-CM ICD-9-CM   1. 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   • Cerebral vasospasm   • Cervical pain (neck)   • Depressed   • Depression   • Dyspepsia   • Acid reflux   • Groin hematoma   • HTN, goal below 140/90   • HLD (hyperlipidemia)   • Infection of artery   • Iron deficiency anemia   • MRSA (methicillin resistant staph aureus) culture positive   • Rheumatoid arthritis   • Urinary incontinence, mixed   • Subarachnoid hemorrhage         Therapy Treatment          IRF Treatment Summary     Row Name 18 1523 18 1000 18 0943       Evaluation/Treatment Time and Intent    Document Type therapy note (daily note)  -AF therapy note (daily note)  -KB therapy note (daily note)  -LB    Mode of Treatment occupational therapy  -AF speech-language pathology;individual therapy  -KB physical therapy  -LB    Patient/Family Observations  -- Pt alert and cooperative. Pt stated she felt good and well-rested today.   -KB up in w/c. Agreed to PT  -LB    Start Time (Evaluation/Treatment)  -- 1000  -KB  --    Stop Time (Evaluation/Treatment)  -- 1030  -KB  --    Recorded by [AF] ANNIE Espinoza [KB] Katherine L Bruton [LB] Kera Cabrera, PTA    Row Name 18 0852             Evaluation/Treatment Time and Intent    Document Type therapy note (daily note)  -AF      Mode of Treatment occupational therapy  -AF      Patient/Family Observations pt stated she slept better last night  -AF      Recorded by [AF] ANNIE Espinoza       Row Name 04/18/18 1523 04/18/18 0943 04/18/18 0852       Cognition/Psychosocial- PT/OT    Orientation Status (Cognition) oriented to;person  -AF  -- oriented to;person;place  -AF    Follows Commands (Cognition)  --  -- follows one step commands;75-90% accuracy  -AF    Safety Deficit (Cognitive)  -- --   wound vac, contact precautions  -LB mild deficit  -AF    Recorded by [AF] Keren Lowe OTR [LB] Kera Cabrera PTA [AF] Keren Lowe, OTR    Row Name 04/18/18 0943             Bed Mobility Assessment/Treatment    Rolling Left [place] (Bed Mobility) supervision;verbal cues  -LB      Rolling Right [place] (Bed Mobility) supervision;verbal cues  -LB      Supine-Sit [place] (Bed Mobility) supervision  -LB      Sit-Supine [place] (Bed Mobility) supervision  -LB      Comment (Bed Mobility) assessed on txment mat  -LB      Recorded by [LB] Kera Cabrera PTA      Row Name 04/18/18 0943 04/18/18 0852          Transfer Assessment/Treatment    Bed-Chair [place] (Transfers) stand by assist  -LB  --     Chair-Bed [place] (Transfers) stand by assist  -LB  --     Sit-Stand [place] (Transfers) stand by assist  -LB contact guard;verbal cues  -AF     Stand-Sit [place] (Transfers) stand by assist  -LB verbal cues;contact guard  -AF     [place] Level (Toilet Transfer)  -- contact guard;verbal cues  -AF     Assistive Device (Toilet Transfer)  -- commode;grab bars/safety frame;walker, front-wheeled  -AF     [place] Level (Shower Transfer)  -- contact guard;verbal cues  -AF     Assistive Device (Shower Transfer)  -- shower chair;walker, front-wheeled  -AF     Recorded by [LB] Kera Cabrera PTA [AF] ANNIE Espinoza     Row Name 04/18/18 0943 04/18/18 0852          Sit-Stand Transfer    Assistive Device (Sit-Stand Transfers) walker, front-wheeled  -LB walker, front-wheeled  -AF     Recorded by [LB] Kera Cabrera PTA [AF] ANNIE Espinoza     Row Name  04/18/18 0943 04/18/18 0852          Stand-Sit Transfer    Assistive Device (Stand-Sit Transfers) walker, front-wheeled  -LB walker, front-wheeled  -AF     Recorded by [LB] Kera Cabrera PTA [AF] Keren Lowe, OTR     Row Name 04/18/18 0852             Toilet Transfer    Type (Toilet Transfer) stand pivot/stand step  -AF      Recorded by [AF] Keren Lowe OTR      Row Name 04/18/18 0852             Shower Transfer    Type (Shower Transfer) stand pivot/stand step  -AF      Recorded by [AF] Keren Lowe, OTR      Row Name 04/18/18 0943             Car Transfer    Type (Car Transfer) sit-stand;stand-sit  -LB      [place] Level (Car Transfer) stand by assist;verbal cues  -LB      Assistive Device (Car Transfer) walker, front-wheeled  -LB      Recorded by [LB] Kera Cabrera PTA      Row Name 04/18/18 0943             Gait/Stairs Assessment/Training    [place] Level (Gait) contact guard;stand by assist  -LB      Assistive Device (Gait) walker, front-wheeled  -LB      Distance in Feet (Gait) 340  -LB      Pattern (Gait) step-through  -LB      Deviations/Abnormal Patterns (Gait) --   cues to scan to the right  -LB      [place] Level (Stairs) contact guard  -LB      Handrail Location (Stairs) right side (ascending)  -LB      Number of Steps (Stairs) 12  -LB      Ascending Technique (Stairs) step-over-step  -LB      Descending Technique (Stairs) step-to-step  -LB      Recorded by [LB] Kera Cabrera PTA      Row Name 04/18/18 0852             Safety Issues, Functional Mobility    Comment, Safety Issues/Impairments (Mobility) pt walked in room with RWX with CGA  -AF      Recorded by [AF] Keren Lowe OTR      Row Name 04/18/18 0852             Bathing Assessment/Treatment    Bathing [place] Level bathing skills;contact guard assist;verbal cues  -AF      Assistive Device (Bathing) grab bar/tub rail;shower chair;hand held shower spray hose  -AF      Bathing Position unsupported  sitting;supported standing  -AF      Recorded by [AF] ANNIE Espinoza      Row Name 04/18/18 0852             Upper Body Dressing Assessment/Treatment    Upper Body Dressing Task upper body dressing skills;set up assistance  -AF      Upper Body Dressing Position unsupported sitting  -AF      Recorded by [AF] ANNIE Espinoza      Row Name 04/18/18 0852             Lower Body Dressing Assessment/Treatment    Lower Body Dressing [place] Level doff;don;pants/bottoms;shoes/slippers;socks;underwear;verbal cues;contact guard assist  -AF      Lower Body Dressing Position supported standing;unsupported sitting  -AF      Comment (Lower Body Dressing) pt able to cross legs and start brief and pants and then able to don socks   -AF      Recorded by [AF] ANNIE Espinoza      Row Name 04/18/18 0852             Grooming Assessment/Treatment    Grooming [place] Level grooming skills;contact guard assist;standby assist  -AF      Grooming Position unsupported standing  -AF      Recorded by [AF] ANNIE Espinoza      Row Name 04/18/18 0852             Toileting Assessment/Treatment    Toileting [place] Level toileting skills;verbal cues;contact guard assist  -AF      Assistive Device Use (Toileting) grab bar/safety frame;raised toilet seat  -AF      Toileting Position unsupported sitting;supported standing  -AF      Recorded by [AF] ANNIE Espinoza      Row Name 04/18/18 1523 04/18/18 1000 04/18/18 0943       Pain Scale: Numbers Pre/Post-Treatment    Pain Scale: Numbers, Pretreatment 0/10 - no pain  -AF 0/10 - no pain  -KB 0/10 - no pain  -LB    Recorded by [AF] ANNIE Espinoza [KB] Katherine L Bruton [LB] Kera Cabrera, [facility]    Row Name 04/18/18 0852             Pain Scale: Numbers Pre/Post-Treatment    Pain Scale: Numbers, Pretreatment 0/10 - no pain  -AF      Recorded by [AF] ANNIE Espinoza      Row Name 04/18/18 0852             Static Sitting Balance    Level of  [place] (Unsupported Sitting, Static Balance) supervision  -AF      Recorded by [AF] ANNIE Espinoza      Row Name 04/18/18 0943 04/18/18 0852          Static Standing Balance    Level of [place] (Supported Standing, Static Balance)  -- standby assist;contact guard assist  -AF     Time Able to Maintain Position (Supported Standing, Static Balance)  -- 2 to 3 minutes  -AF     Assistive Device Utilized (Supported Standing, Static Balance)  -- --   standing with grab bar  -AF     Level of [place] (Unsupported Standing, Static Balance) contact guard assist;minimal assist, 75% patient effort  -LB  --     Time Able to Maintain Position (Unsupported Standing, Static Balance) 1 to 2 minutes  -LB  --     Comment (Unsupported Standing, Static Balance) on a foam 1/2 roll, and also on dense foam pad.  -LB  --     Recorded by [LB] Kera Cabrera PTA [AF] ANNIE Espinoza     Row Name 04/18/18 0943             Dynamic Balance Activity    Therapeutic Training Performed (Dynamic Balance) obstacle course   stepping over objects, picking object up from floor. CGA, vc  -LB      Support Needed for Balance (Dynamic Balance Training) --   Rwx  -LB      Comment (Dynamic Balance Training) amb on uneven surface x 12 ft, rwx, cga  -LB      Recorded by [LB] Kera Cabrera PTA      Row Name 04/18/18 1523             Upper Extremity Seated Therapeutic Exercise    Performed, Seated Upper Extremity (Therapeutic Exercise) shoulder flexion/extension;shoulder horizontal abduction/adduction;scapular protraction/retraction;forearm supination/pronation;wrist flexion/extension  -AF      Device, Seated Upper Extremity (Therapeutic Exercise) free weights, barbell   #2 hand weight and dowel nico, yellow theraband  -AF      Exercise Type, Seated Upper Extremity (Therapeutic Exercise) AROM (active range of motion);resistive exercise  -AF      Expected Outcomes, Seated Upper Extremity (Therapeutic Exercise) improve functional  tolerance, self-care activity  -AF      Sets/Reps Detail, Seated Upper Extremity (Therapeutic Exercise) 15 reps x 2 sets   -AF      Transfers Skills, Training to Functional Activity, Seated Upper Extremity (Therapeutic Exercise) transfers skills to functional activity most of the time  -AF      Comment, Seated Upper Extremity (Therapeutic Exercise) hand gripper B'ly 20 reps   -AF      Recorded by [AF] ANNIE Espinoza      Row Name 04/18/18 0943             Lower Extremity Standing Therapeutic Exercise    Performed, Standing Lower Extremity (Therapeutic Exercise) mini-squats;heel raises  -LB      Exercise Type, Standing Lower Extremity (Therapeutic Exercise) AROM (active range of motion)  -LB      Sets/Reps Detail, Standing Lower Extremity (Therapeutic Exercise) 1/20  -LB      Recorded by [LB] Kera Cabrera PTA      Row Name 04/18/18 1523 04/18/18 0943 04/18/18 0852       Positioning and Restraints    Pre-Treatment Position sitting in chair/recliner  -AF  -- in bed  -AF    Post Treatment Position wheelchair  -AF wheelchair  -LB wheelchair  -AF    In Wheelchair sitting;call light within reach;encouraged to call for assist;exit alarm on  -AF sitting;with SLP  -LB sitting;call light within reach;encouraged to call for assist;exit alarm on  -AF    Recorded by [AF] ANNIE Espinoza [LB] Kera Cabrera PTA [AF] ANNIE Espinoza      User Key  (r) = Recorded By, (t) = Taken By, (c) = Cosigned By    Initials Name Effective Dates    LB Kera Cabrera PTA 03/07/18 -     AF ANNIE Espinoza 04/03/18 -     KB Katherine L Bruton 03/07/18 -           Wound Right groin (Active)   Dressing Appearance dry;intact 4/18/2018 12:49 PM   Closure TULIO 4/17/2018 10:30 PM   Base moist 4/18/2018 12:49 PM   Red (%), Wound Tissue Color 90 4/18/2018 12:49 PM   Periwound pink 4/18/2018 12:49 PM   Edges open 4/18/2018 12:49 PM   Wound length (cm) 10.5 cm 4/18/2018 12:49 PM   Wound width (cm) 5 cm 4/18/2018 12:49 PM    Wound depth (cm) 2.5 cm 4/18/2018 12:49 PM   Tunneling [Depth (cm)/Location] undermining  [id number] 5.5cm/4-6 6cm 4/18/2018 12:49 PM   Drainage Characteristics/Odor serosanguineous 4/18/2018 12:49 PM   Drainage Amount moderate 4/18/2018 12:49 PM   Care, Wound cleansed with;sterile normal saline 4/18/2018 12:49 PM   Dressing Care, Wound dressing applied;dressing changed;foam;transparent film 4/18/2018 12:49 PM   Periwound Care, Wound barrier film applied 4/18/2018 12:49 PM       NPWT (Negative Pressure Wound Therapy) R groin (Active)   Therapy Setting continuous therapy 4/18/2018 12:49 PM   Dressing foam, black;foam, white;transparent dressing 4/18/2018 12:49 PM   Pressure Setting 75 mmHg 4/18/2018  8:00 AM         OT Recommendation and Plan                       OT IRF GOALS     Row Name 04/12/18 1524             Transfer Goal 1 (OT-IRF)    Activity/Assistive Device (Transfer Goal 1, OT-IRF) toilet;shower chair;walk-in shower;tub  -SO      [place] Level (Transfer Goal 1, OT-IRF) supervision required;verbal cues required  -SO      Time Frame (Transfer Goal 1, OT-IRF) long term goal (LTG);2 weeks  -SO         LB Dressing Goal 1 (OT-IRF)    Activity/Device (LB Dressing Goal 1, OT-IRF) lower body dressing  -SO      [place] (LB Dressing Goal 1, OT-IRF) supervision required  -SO      Time Frame (LB Dressing Goal 1, OT-IRF) long term goal (LTG);2 weeks  -SO         Toileting Goal 1 (OT-IRF)    Activity/Device (Toileting Goal 1, OT-IRF) toileting skills, all;grab bar/safety frame  -SO      [place] Level (Toileting Goal 1, OT-IRF) supervision required  -SO      Time Frame (Toileting Goal 1, OT-IRF) long term goal (LTG);2 weeks  -SO         Strength Goal 1 (OT-IRF)    Strength Goal 1 (OT-IRF) Pt to increase overall BUE strength to 4-/5 to assist with functional activities and ADLs.  -SO      Time Frame (Strength Goal 1, OT-IRF) long term goal (LTG);2 weeks  -SO         Caregiver Training Goal 1 (OT-IRF)     Caregiver Training Goal 1 (OT-IRF) Pt and caregiver to be independent with AE, HEP, home safety, etc. at d/c.  -SO      Time Frame (Caregiver Training Goal 1, OT-IRF) long term goal (LTG);2 weeks  -SO        User Key  (r) = Recorded By, (t) = Taken By, (c) = Cosigned By    Initials Name Provider Type    SO Margo Kyra Jacob OTR Occupational Therapist                 Time Calculation:           Time Calculation- OT     Row Name 04/18/18 1526 04/18/18 0901          Time Calculation- OT    OT Start Time 1030  -AF 0800  -AF     OT Stop Time 1100  -AF 0845  -AF     OT Time Calculation (min) 30 min  -AF 45 min  -AF     OT Non-Billable Time (min) 30 min   family meeting  -AF  --       User Key  (r) = Recorded By, (t) = Taken By, (c) = Cosigned By    Initials Name Provider Type    AF Keren Lowe OTSANGITA Occupational Therapist             Therapy Charges for Today     Code Description Service Date Service Provider Modifiers Qty    40517273421 HC OT SELF CARE/MGMT/TRAIN EA 15 MIN 4/17/2018 ANNIE Espinoza GO 2    29274446331 HC OT THERAPEUTIC ACT EA 15 MIN 4/17/2018 ANNIE Espinoza GO 1    25063889620 HC OT NEUROMUSC RE EDUCATION EA 15 MIN 4/17/2018 ANNIE Espinoza GO 1    30700572796 HC OT SELF CARE/MGMT/TRAIN EA 15 MIN 4/18/2018 ANNIE Espinoza GO 3    94325548293 HC OT CARE PLAN EA 15 MIN 4/18/2018 ANNIE Espinoza GO 2    12861019627 HC OT THER PROC EA 15 MIN 4/18/2018 ANNIE Espinoza GO 2                   ANNIE Espinoza  4/18/2018

## 2018-04-18 NOTE — PROGRESS NOTES
Inpatient Rehabilitation Plan of Care Note    Plan of Care  Care Plan Reviewed - No updates at this time.    Safety    Performed Intervention(s)  Safety rounds; call light/items within easy reach  Bed Alarm      Body Function Structure    Performed Intervention(s)  Skin assessment every shift  Dressing changes/Wound Care as order  Turning      Sphincter Control    Performed Intervention(s)  Monitor I&O  Timed voids  Use incontinent products      Psychosocial    Performed Intervention(s)  Encourage to verbalize concerns  Offer diversional activities    Signed by: Haley Martin RN

## 2018-04-18 NOTE — NURSING NOTE
Spoke with [facility] for Dr. MOON. Notified her of DC on 4/20. Faxed Monday's labs to Dr. MOON's office and follow up appointment scheduled for next week.

## 2018-04-18 NOTE — PROGRESS NOTES
Inpatient Rehabilitation Functional Measures Assessment    Functional Measures  BERONICA Eating:  [facility] Grooming: [facility] Bathing:  [facility] Upper Body Dressing:  [place]  [facility] Lower Body Dressing:  [facility] Toileting:  [facility] Bladder Management  Level of Assistance:  [place]  Frequency/Number of Accidents this Shift:  [facility] Bowel Management  Level of Assistance: [place]  Frequency/Number of Accidents this Shift: [facility] Bed/Chair/Wheelchair Transfer:  [facility] Toilet Transfer:  [facility] Tub/Shower Transfer:  [place]    Previously Documented Mode of Locomotion at Discharge: Field  BERONICA Expected Mode of Locomotion at Discharge: [facility] Walk/Wheelchair:  [facility] Stairs:  [facility] Comprehension:  Auditory comprehension is the usual mode. Patient does not  comprehend complex/abstract information in their primary language without  assistance from a helper. Comprehension Score = 4, Minimal Prompting. Patient  comprehends basic daily needs or ideas 75-90% of the time. Patient requires  minimal/occasional prompting. No assistive devices were required.  BERONICA Expression:  Vocal expression is the usual mode. Patient does not express  complex/abstract information in their primary language without a helper.  Expression Score = 4, Minimal Prompting. Patient expresses basic daily needs or  ideas 75-90% of the time.  Patient requires minimal/occasional prompting. No  assistive devices were required.  BERONICA Social Interaction:  Social Interaction Score = 7, Independent. Patient is  completely independent for social interaction.  There are no activity  limitations.  BERONICA Problem Solving:  Patient does not make appropriate decisions in order to  solve complex problems without assistance from a helper. Problem Solving Score =  3, Moderate Direction. Patient makes appropriate decisions in order to solve  routine problems 50-74% of the time. Patient requires moderate/some direction  for the  following behavior(s): Difficulty completing tasks. Difficulty with  self-monitoring. Decreased awareness of performance.  BERONICA Memory:  Activity was not observed.    Therapy Mode Minutes  Occupational Therapy: [place]  Physical Therapy: [facility]  Speech Language Pathology:  [place]    Signed by: Haley Martin RN

## 2018-04-19 PROCEDURE — 97112 NEUROMUSCULAR REEDUCATION: CPT

## 2018-04-19 PROCEDURE — 97110 THERAPEUTIC EXERCISES: CPT

## 2018-04-19 PROCEDURE — 25010000003 CEFTRIAXONE PER 250 MG: Performed by: PHYSICAL MEDICINE & REHABILITATION

## 2018-04-19 PROCEDURE — 97535 SELF CARE MNGMENT TRAINING: CPT

## 2018-04-19 PROCEDURE — G0515 COGNITIVE SKILLS DEVELOPMENT: HCPCS

## 2018-04-19 RX ADMIN — OXYCODONE HYDROCHLORIDE AND ACETAMINOPHEN 500 MG: 500 TABLET ORAL at 08:51

## 2018-04-19 RX ADMIN — FOLIC ACID 1 MG: 1 TABLET ORAL at 08:50

## 2018-04-19 RX ADMIN — METRONIDAZOLE 500 MG: 500 TABLET, FILM COATED ORAL at 05:49

## 2018-04-19 RX ADMIN — CEFTRIAXONE SODIUM 2 G: 2 INJECTION, SOLUTION INTRAVENOUS at 15:07

## 2018-04-19 RX ADMIN — METRONIDAZOLE 500 MG: 500 TABLET, FILM COATED ORAL at 15:05

## 2018-04-19 RX ADMIN — ESCITALOPRAM 20 MG: 20 TABLET, FILM COATED ORAL at 08:50

## 2018-04-19 RX ADMIN — LACOSAMIDE 100 MG: 100 TABLET, FILM COATED ORAL at 20:38

## 2018-04-19 RX ADMIN — PYRIDOXINE HCL TAB 50 MG 50 MG: 50 TAB at 08:50

## 2018-04-19 RX ADMIN — PANTOPRAZOLE SODIUM 40 MG: 40 TABLET, DELAYED RELEASE ORAL at 05:49

## 2018-04-19 RX ADMIN — LEVETIRACETAM 1000 MG: 500 TABLET, FILM COATED ORAL at 20:38

## 2018-04-19 RX ADMIN — Medication 1 TABLET: at 08:50

## 2018-04-19 RX ADMIN — FUROSEMIDE 20 MG: 20 TABLET ORAL at 08:51

## 2018-04-19 RX ADMIN — LEVETIRACETAM 1000 MG: 500 TABLET, FILM COATED ORAL at 08:51

## 2018-04-19 RX ADMIN — ASPIRIN 325 MG: 325 TABLET ORAL at 08:51

## 2018-04-19 RX ADMIN — CETIRIZINE HYDROCHLORIDE 5 MG: 10 TABLET, FILM COATED ORAL at 08:51

## 2018-04-19 RX ADMIN — METOPROLOL SUCCINATE 25 MG: 25 TABLET, FILM COATED, EXTENDED RELEASE ORAL at 08:50

## 2018-04-19 RX ADMIN — CLOPIDOGREL 75 MG: 75 TABLET, FILM COATED ORAL at 08:51

## 2018-04-19 RX ADMIN — HYDRALAZINE HYDROCHLORIDE 10 MG: 10 TABLET, FILM COATED ORAL at 15:05

## 2018-04-19 RX ADMIN — VITAMIN D, TAB 1000IU (100/BT) 1000 UNITS: 25 TAB at 08:50

## 2018-04-19 RX ADMIN — MONTELUKAST 10 MG: 10 TABLET, FILM COATED ORAL at 08:51

## 2018-04-19 RX ADMIN — METRONIDAZOLE 500 MG: 500 TABLET, FILM COATED ORAL at 20:39

## 2018-04-19 RX ADMIN — HYDRALAZINE HYDROCHLORIDE 10 MG: 10 TABLET, FILM COATED ORAL at 05:49

## 2018-04-19 RX ADMIN — LACOSAMIDE 100 MG: 100 TABLET, FILM COATED ORAL at 08:51

## 2018-04-19 NOTE — PROGRESS NOTES
LOS: 8 days   Patient Care Team:  Stanislaw Esposito MD as PCP - General     Chief Complaint:   SAH, opthalmic artery aneurysm s/p endovascular embolization.   Right Groin hematoma, right groin wound infection s/p wound vac placement.   HTN        Subjective     History of Present Illness    Subjective   She does not describe any new complaints.  Indicates tolerating therapies.  Does not describe any significant discomfort at the right groin.    History taken from: patient    Objective     Vital Signs  Temp:  [97.6 °F (36.4 °C)-98 °F (36.7 °C)] 98 °F (36.7 °C)  Heart Rate:  [72-84] 72  Resp:  [18] 18  BP: (122-177)/(60-81) 135/63    Objective  MENTAL STATUS-awake, alert  HEENT - NCAT   LUNGS -CTA  HEART -RRR  ABD - NABS, soft, NT  EXT - VAC RIGHT GROIN  NEURO - takes resistance bilateral upper extremities and lower extremities      Results Review:     I reviewed the patient's new clinical results.    Results from last 7 days  Lab Units 04/16/18  0507   WBC 10*3/mm3 5.50   HEMOGLOBIN g/dL 9.3*   HEMATOCRIT % 29.3*   PLATELETS 10*3/mm3 179         Results from last 7 days  Lab Units 04/16/18  0507   SODIUM mmol/L 137   POTASSIUM mmol/L 3.7   CHLORIDE mmol/L 102   CO2 mmol/L 22.3   BUN mg/dL 20   CREATININE mg/dL 0.89   CALCIUM mg/dL 8.6   BILIRUBIN mg/dL 0.8   ALK PHOS U/L 51   ALT (SGPT) U/L 19   AST (SGOT) U/L 33*   GLUCOSE mg/dL 89       Results from last 7 days  Lab Units 04/16/18  0507   CRP mg/dL 5.77*       Results from last 7 days  Lab Units 04/16/18  0507   SED RATE mm/hr 36*         Medication Review: DONE  Scheduled Meds:    aspirin 325 mg Oral Daily   ceftriaxone 2 g Intravenous Q24H   cetirizine 5 mg Oral Daily   cholecalciferol 1,000 Units Oral Daily   clopidogrel 75 mg Oral Daily   escitalopram 20 mg Oral Daily   folic acid 1 mg Oral Daily   furosemide 20 mg Oral Daily   hydrALAZINE 10 mg Oral Q8H   lacosamide 100 mg Oral Q12H   levETIRAcetam 1,000 mg Oral BID   metoprolol succinate XL 25 mg  Oral Q24H   metroNIDAZOLE 500 mg Oral Q8H   montelukast 10 mg Oral Daily   multivitamin 1 tablet Oral Daily   pantoprazole 40 mg Oral Q AM   vitamin B-6 50 mg Oral Daily   vitamin C 500 mg Oral Daily     Continuous Infusions:   PRN Meds:.•  albuterol  •  EPINEPHrine  •  hydrALAZINE  •  oxyCODONE-acetaminophen **OR** oxyCODONE-acetaminophen  •  triamcinolone      Assessment/Plan     Active Problems:    Subarachnoid hemorrhage      Assessment & Plan   SAH, 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   Aspirin / Plavix  Decadron tapered  GI prophylaxis - Protonix  Seizure - Vimpat / Keppra  HTN - SBP goal 120-180, s/p vasospams - Patient's SBP goal is 120-180 per Neurosurgery.  April 12 - SBP was averaging 140's and ranged 120's to 180's recently at [facility].  She was on Hydralazine 25 mg q  8 hours scheduled and Hydralazine 10 mg IV q 4 hours prn SBP >180 or DBP > 100 with last dose 4-11-18 at 07:35 there.  At discharge she was restarted on Lasix 20 mg daily, Metoprolol 100 mg daily and Doxazosin 2 mg HS , which she was not on there.  SBP was 168 last PM and 118 this AM.  Will continue Hydralazine scheduled and po prn and Lasix, but hold additional Metoprolol and Doxazosin for now to avoid excessive BP reduction s/p aneurysm stent and s/p vasospasm. She completed Nimotop April 11.   April 13 -  - resume Metoprolol succinate at lower dose of 25 mg daily  April 16 - BP elevated last PM SBP 180s - given hydralazine prn, increased Metoprolol to 50 mg daily; may titrate up on hydralazine, follow pattern.  April 17 - She refused hydralazine night before last. Metoprolol increased yesterday. - yesterday. Hydralazine held last PM and this afternoon again as . Continue to follow pattern.   April 18- continue scheduled BP medication reduced metoprolol to 25 mg XL daily.   Variable BP pattern.  Son notes cognition is worse when BP lower. Son who is physician reports Neurosurgery felt she  could be susceptiable to vasospasm for some time.    She is on much less BP meds compared to what is reported at home.  Hydralazine 25 mg has been given only once each day last three days.  Metoprolol held today and decreased to 25 mg daily starting tomorrow.   Have also decreased Hydralazine from 25 mg to 10 mg q 8 hours, hold for SBP< 120.   On Lasix 20 mg daily.  Will need established routine BP med regimen prior to discharge.  April 19 - blood pressure pattern with less variation today    Right Groin hematoma, right groin wound infection s/p wound vac placement.   ID - Rocephin / Metronidazole x 4 weeks - Clarified with [facility] pharmacy - Ceftriazone started 4-7 and Metronidazole started 4-11 - will do stop date May 9.April 16 - weekly labs reviewed. Results to ID.     DVT prophylaxis - SCDs         TEAM CONF - April 13 - BALANCE AND PROPRIOCEPTIVE DEFICITS, SOMETIMES RUN INTO OBJECTS TO RIGHT, CORRECT WITH CUING. TRANSFERS CTG. GAIT 160 FEET RW CTG/MIN ASSIST. WOUND VAC RIGHT GROIN. BATH MIN. LBD MOD, UBD MIN ASSIST. COGNITION - SLOW PROCESSING AND MILD DIFFICULTY WITH EXECUTIVE FUNCTION. CONTINENT BOWEL AND BLADDER.  ELOS- ONE WEEK      April 18 - In PT, transfers SBA. Gait 300 feet RW SBA/CTG.    With Speech therapy, does better with visual memory compared to verbal memory.    With OT, UBD SBA.    BNE April 19 -   BNE (Active)  Att'n. - Mildly Imp.  Exec. Fx. - Mod. Imp.  Rsng/Jgmnt - Mildly Imp.  Arith - WNL  Visuospatial Skills - Mildly Imp.  Visual Mem. - Mildly Imp.  Verbal Mem. - Severely Imp.  Emot - Pt denied emotional distress    April 19 - in physical therapy, transfers contact-guard- stand by assist.  Gait 340 feet at times minimal assist rolling walker.  Still decreased attention to the right side.  12 stairs with contact-guard assist.  In speech therapy,Pt continues with overall moderate cognitive deficits, notably in the areas of memory, reasoning, and thought organization. Pt also with  mild expressive language difficulties, verbally and in writing. She presents with paraphasias at times and has difficulty with spelling out single words when writing.  In occupational therapy, tub and toilet transfers and lower body dressings assist.    Do not anticipate discharge on April 20 as wish to have her routine blood pressure medication regimen established and also would like her to have further functional improvement before home    Misha Prince MD  04/19/18  6:39 PM    Time:

## 2018-04-19 NOTE — PROGRESS NOTES
Inpatient Rehabilitation Functional Measures Assessment and Plan of Care    Plan of Care  Updated Problems/Interventions  Mobility    [OT] Toilet Transfers(Active)  Current Status(04/19/2018): SBA  Weekly Goal(04/20/2018): SBA  Discharge Goal: SBA    [OT] Tub/Shower Transfers(Active)  Current Status(04/19/2018): SBA  Weekly Goal(04/20/2018): SBA  Discharge Goal: SBA        Self Care    [OT] Bathing(Active)  Current Status(04/19/2018): SBA  Weekly Goal(04/20/2018): SBA  Discharge Goal: SBA    [OT] Dressing (Lower)(Active)  Current Status(04/19/2018): SBA  Weekly Goal(04/20/2018): SBA  Discharge Goal: SBA    [OT] Dressing (Upper)(Active)  Current Status(04/19/2018): SBA  Weekly Goal(04/20/2018): SBA  Discharge Goal: SBA    [OT] Grooming(Active)  Current Status(04/19/2018): SBA  Weekly Goal(04/20/2018): SBA  Discharge Goal: SBA    [OT] Toileting(Active)  Current Status(04/19/2018): SBA  Weekly Goal(04/20/2018): SBA  Discharge Goal: SBA    Functional Measures  BERONICA Eating:  Branch  BERONICA Grooming: Grooming Score = 5. Patient is supervision/set-up for grooming,  requiring: Stand by assistance. Verbal cuing, prompting, or instructing. No  assistive devices were required.  BERONICA Bathing:  Patient bathed in shower. Bathing Score = 5.  Patient is  supervision/set-up for bathing, requiring: Standing by. Verbal cuing, prompting,  or instructing. Patient requires the following assistive device(s): Hand held  shower. Grab bar/arm rest to maintain balance.  BERONICA Upper Body Dressing:  Upper Body Dressing Score = 5. Patient is supervision  for upper body dressing, requiring: Stand by assistance. No assistive devices  were required.  BERONICA Lower Body Dressing:  Lower Body Dressing Score = 5. Patient is  supervision/set-up for lower body dressing, requiring: Standing by. Verbal  cuing, prompting, or instructing. No assistive devices were required.  BERONICA Toileting:  Toileting Score = 5.  Patient is supervision/set-up for  toileting, requiring:  Stand by assistance. Patient requires the following  assistive device(s): Grab bar. Adaptive device to maintain balance.    BERONICA Bladder Management  Level of Assistance:  [place]  Frequency/Number of Accidents this Shift:  Branch    [facility] Bowel Management  Level of Assistance: [place]  Frequency/Number of Accidents this Shift: Branch    [facility] Bed/Chair/Wheelchair Transfer:  Branch  [facility] Toilet Transfer:  Toilet Transfer Score = 5.  Patient is supervision/set-up  for transferring to and from the toilet/commode, requiring: Stand by assistance.  Patient requires the following assistive device(s): Walker. Grab bars.  BERONICA Tub/Shower Transfer:  Shower Transfer Score = 5.  Patient is  supervision/set-up for transferring to and from the shower, requiring: Stand by  assistance. Verbal cuing, prompting, or instructing. Patient requires the  following assistive device(s): Walker. Grab bars. Shower chair.    Previously Documented Mode of Locomotion at Discharge: Field  BERONICA Expected Mode of Locomotion at Discharge: Branch  [facility] Walk/Wheelchair:  Branch  [facility] Stairs:  Branch    [facility] Comprehension:  Branch  [facility] Expression:  Branch  [facility] Social Interaction:  [facility] Problem Solving:  [facility] Memory:  Branch    Therapy Mode Minutes  Occupational Therapy: Individual: 60 minutes.  Physical Therapy: Branch  Speech Language Pathology:  Branch    Signed by: Keren Lowe OT

## 2018-04-19 NOTE — THERAPY TREATMENT NOTE
Inpatient Rehabilitation - Physical Therapy Treatment Note  [facility]     Patient Name: Herlinda Ta  : 1940  MRN: [id number]    Today's Date: 2018                 Admit Date: 2018      Visit Dx:      ICD-10-CM ICD-9-CM   1. 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   • Cerebral vasospasm   • Cervical pain (neck)   • Depressed   • Depression   • Dyspepsia   • Acid reflux   • Groin hematoma   • HTN, goal below 140/90   • HLD (hyperlipidemia)   • Infection of artery   • Iron deficiency anemia   • MRSA (methicillin resistant staph aureus) culture positive   • Rheumatoid arthritis   • Urinary incontinence, mixed   • Subarachnoid hemorrhage       Therapy Treatment    Evaluation/Coping    Evaluation/Treatment Time and Intent  Document Type: therapy note (daily note) (18 : Kera Cabrera PTA)  Mode of Treatment: physical therapy (18 : Kera Cabrera PTA)  Patient/Family Observations: up in w/c. Agreed to PT (18 : Kera Cabrera PTA)    Vitals/Pain/Safety    Pain Scale: Numbers Pre/Post-Treatment  Pain Scale: Numbers, Pretreatment: 0/10 - no pain (18 : Kera Cabrera PTA)  Positioning and Restraints  Post Treatment Position: wheelchair (18 : Kera Cabrera PTA)  In Wheelchair: sitting, with SLP (18 : Kera Cabrera PTA)    Cognition/Communication         Oral Motor/Eating         Mobility/Basic Activities/Instrumental Activities/Motor/Modality    Bed Mobility Assessment/Treatment  Comment (Bed Mobility): up in w/c (18 : Kera Cabrera PTA)  Sit-Stand Transfer  Sit-Stand [place] (Transfers): contact guard, stand by assist, verbal cues (18  0938 : Kera Cabrera PTA)  Assistive Device (Sit-Stand Transfers): walker, front-wheeled (04/19/18 0938 : Kera Cabrera PTA)  Stand-Sit Transfer  Stand-Sit [place] (Transfers): contact guard, stand by assist, verbal cues (04/19/18 0938 : Kera Cabrera PTA)  Assistive Device (Stand-Sit Transfers): walker, front-wheeled (04/19/18 0938 : Kera Cabrera PTA)  Gait/Stairs Assessment/Training  [place] Level (Gait): minimum assist (75% patient effort) (04/19/18 0938 : Kera Cabrera PTA)  Assistive Device (Gait): walker, front-wheeled (04/19/18 0938 : Kera Cabrera PTA)  Distance in Feet (Gait): 200 (then 340. 2nd walk in AM better than the 1st.) (04/19/18 0938 : Kera Cabrera PTA)  Bilateral Gait Deviations: leans left (fast pace.Mult cues to slow down Running intp objects on R) (04/19/18 0938 : Kera Cabrera PTA)  [place] Level (Stairs): contact guard (04/19/18 0938 : Kera Cabrera PTA)  Handrail Location (Stairs): right side (ascending) (04/19/18 0938 : Kera Cabrera PTA)  Number of Steps (Stairs): 12 (04/19/18 0938 : Kera Cabrera PTA)  Ascending Technique (Stairs): step-over-step (04/19/18 0938 : Kera Cabrera PTA)  Descending Technique (Stairs): step-to-step (04/19/18 0938 : Kera Cabrera PTA)              ROM/MMT                   Sensory/Myotome/Dermatome/Edema               Posture/Balance/Special Tests/Exercise/Transportation/Sexual Function    Dynamic Balance Activity  Therapeutic Training Performed (Dynamic Balance):  (stepping over hurdles, in // bars CG/Min. Alt toe taps) (04/19/18 0938 : Kera Cabrera PTA)    Lower Extremity Standing Therapeutic Exercise  Performed, Standing Lower Extremity (Therapeutic Exercise): mini-squats, heel raises (04/19/18 0938 : Kera Cabrera, HUMA)  Exercise Type, Standing Lower Extremity (Therapeutic Exercise): AROM (active range of motion) (04/19/18 0938 : Kera Cabrera, PTA)  Restrictions,  Standing Lower Extremity (Therapeutic Exercise): 1/10 (04/19/18 0938 : Kera Cabrera, HUMA)         Orthotics/Residual Limb/Prosthetic Management              Outcome Summary    Weekly Summary of Progress (PT)  Weekly Outcome Summary: Physical Therapy: IN AM pnt did not do as well. Required more assist for balance, ambulated way too fast, bumped into multiple objects on the right. She did much better in PM. (04/19/18 0938 : Kera Cabrera PTA)            PT Recommendation and Plan                         PT IRF GOALS     Row Name 04/19/18 1600 04/12/18 0900          Bed Mobility Goal 1 (PT-IRF)    Activity/Assistive Device (Bed Mobility Goal 1, PT-IRF) bed mobility activities, all  -LB bed mobility activities, all  -EE     [place] Level (Bed Mobility Goal 1, PT-IRF) independent  -LB independent  -EE     Time Frame (Bed Mobility Goal 1, PT-IRF) 1 day  -LB long term goal (LTG);2 weeks  -EE     Progress/Outcomes (Bed Mobility Goal 1, PT-IRF) goal ongoing  -LB goal ongoing  -EE        Transfer Goal 1 (PT-IRF)    Activity/Assistive Device (Transfer Goal 1, PT-IRF) sit-to-stand/stand-to-sit;bed-to-chair/chair-to-bed   w AAD  -LB sit-to-stand/stand-to-sit;bed-to-chair/chair-to-bed   with A.A.D.  -EE     [place] Level (Transfer Goal 1, PT-IRF) conditional independence  -LB conditional independence  -EE     Time Frame (Transfer Goal 1, PT-IRF) 1 day  -LB long term goal (LTG);2 weeks  -EE     Progress/Outcomes (Transfer Goal 1, PT-IRF) goal ongoing  -LB goal ongoing  -EE        Transfer Goal 2 (PT-IRF)    Activity/Assistive Device (Transfer Goal 2, PT-IRF) car transfer  -LB car transfer  -EE     [place] Level (Transfer Goal 2, PT-IRF) supervision required  -LB supervision required  -EE     Time Frame (Transfer Goal 2, PT-IRF) 1 day  -LB long term goal (LTG);2 weeks  -EE     Progress/Outcomes (Transfer Goal 2, PT-IRF) goal ongoing  -LB goal ongoing  -EE        Gait/Walking Locomotion Goal 1 (PT-IRF)     Activity/Assistive Device (Gait/Walking Locomotion Goal 1, PT-IRF) gait (walking locomotion)  -LB gait (walking locomotion)   with A.A.D.  -EE     Gait/Walking Locomotion Distance Goal 1 (PT-IRF) 160  -  -EE     [place] Level (Gait/Walking Locomotion Goal 1, PT-IRF) supervision required  -LB supervision required  -EE     Time Frame (Gait/Walking Locomotion Goal 1, PT-IRF) 1 day  -LB long term goal (LTG);2 weeks  -EE     Progress/Outcomes (Gait/Walking Locomotion Goal 1, PT-IRF) goal ongoing  -LB goal ongoing  -EE        Stairs Goal 1 (PT-IRF)    Activity/Assistive Device (Stairs Goal 1, PT-IRF) ascending stairs;descending stairs;using handrail, left;using handrail, right  -LB ascending stairs;descending stairs;using handrail, left;using handrail, right  -EE     Number of Stairs (Stairs Goal 1, PT-IRF) 12  -LB 12  -EE     [place] Level (Stairs Goal 1, PT-IRF) supervision required  -LB supervision required  -EE     Time Frame (Stairs Goal 1, PT-IRF) 1 day  -LB long term goal (LTG);2 weeks  -EE     Progress/Outcomes (Stairs Goal 1, PT-IRF) goal ongoing  -LB goal ongoing  -EE       User Key  (r) = Recorded By, (t) = Taken By, (c) = Cosigned By    Initials Name Provider Type    LB Kera Cabrera PTA Physical Therapy Assistant    EE Enma Bernstein, PT Physical Therapist                   Time Calculation:           PT Charges     Row Name 04/19/18 1601 04/19/18 0955          Time Calculation    Start Time 1400  -LB 0930  -LB     Stop Time 1430  -LB 1000  -LB     Time Calculation (min) 30 min  -LB 30 min  -LB       User Key  (r) = Recorded By, (t) = Taken By, (c) = Cosigned By    Initials Name Provider Type    [place] Cabrera PTA Physical Therapy Assistant            Therapy Charges for Today     Code Description Service Date Service Provider Modifiers Qty    [id number]  PT THER PROC EA 15 MIN 4/18/2018 Kera Cabrera PTA GP 4    [id number]  PT CARE PLAN EACH 15 MIN 4/18/2018 Kera MOON  Rick, PTA GP 2    [id number]  PT THER PROC EA 15 MIN 4/19/2018 Kera Cabrera, HUMA GP 4                   Kera Cabrera, HUMA  4/19/2018

## 2018-04-19 NOTE — THERAPY DISCHARGE NOTE
Inpatient Rehabilitation - Occupational Therapy Treatment Note/Discharge  [facility]     Patient Name: Herlinda Ta  : 1940  MRN: [id number]  Today's Date: 2018               Admit Date: 2018    Visit Dx:     ICD-10-CM ICD-9-CM   1. Subarachnoid hemorrhage I60.9 430     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   • Cerebral vasospasm   • Cervical pain (neck)   • Depressed   • Depression   • Dyspepsia   • Acid reflux   • Groin hematoma   • HTN, goal below 140/90   • HLD (hyperlipidemia)   • Infection of artery   • Iron deficiency anemia   • MRSA (methicillin resistant staph aureus) culture positive   • Rheumatoid arthritis   • Urinary incontinence, mixed   • Subarachnoid hemorrhage       Therapy Treatment        IRF Treatment Summary     Row Name 18 1518 18 1300 18 1000       Evaluation/Treatment Time and Intent    Document Type discharge evaluation/summary;therapy note (daily note)  -AF discharge treatment  -KB therapy note (daily note)  -KB    Mode of Treatment occupational therapy  -AF speech-language pathology;individual therapy  -KB speech-language pathology;individual therapy  -KB    Patient/Family Observations pt alert and cooperative  -AF Pt alert and cooperative. No complaints. SLP provided HEP for d/c.   -KB Pt alert and cooperative. No complaints.  -KB    Start Time (Evaluation/Treatment)  -- 1300  -KB 1000  -KB    Stop Time (Evaluation/Treatment)  -- 1330  -KB 1030  -KB    Recorded by [AF] ANNIE Espinoza [KB] Katherine L Bruton [KB] Katherine L Bruton    Row Name 18 0938             Evaluation/Treatment Time and Intent    Document Type therapy note (daily note)  -LB      Mode of Treatment physical therapy  -LB      Patient/Family Observations  up in w/c. Agreed to PT  -LB      Recorded by [LB] Kera Cabrera PTA      Row Name 04/19/18 1518             Cognition/Psychosocial- PT/OT    Orientation Status (Cognition) oriented to;person;place  -AF      Follows Commands (Cognition) follows one step commands;75-90% accuracy  -AF      Safety Deficit (Cognitive) mild deficit  -AF      Recorded by [AF] Keren Lowe OTR      Row Name 04/19/18 1518 04/19/18 0938          Transfer Assessment/Treatment    Sit-Stand [place] (Transfers) stand by assist;verbal cues  -AF contact guard;stand by assist;verbal cues  -LB     Stand-Sit [place] (Transfers) verbal cues;stand by assist  -AF contact guard;stand by assist;verbal cues  -LB     [place] Level (Toilet Transfer) verbal cues;stand by assist  -AF  --     Assistive Device (Toilet Transfer) commode;grab bars/safety frame  -AF  --     [place] Level (Shower Transfer) verbal cues;stand by assist  -AF  --     Assistive Device (Shower Transfer) shower chair;grab bars/tub rail  -AF  --     Recorded by [AF] Keren Lowe OTR [LB] Kera Cabrera PTA     Row Name 04/19/18 1518 04/19/18 0938          Sit-Stand Transfer    Assistive Device (Sit-Stand Transfers) walker, front-wheeled  -AF walker, front-wheeled  -LB     Recorded by [AF] Keren Lowe OTR [LB] Kera Cabrera, HUMA     Row Name 04/19/18 1518 04/19/18 0938          Stand-Sit Transfer    Assistive Device (Stand-Sit Transfers) walker, front-wheeled  -AF walker, front-wheeled  -LB     Recorded by [AF] Keren Lowe OTR [LB] Kera Cabrera PTA     Row Name 04/19/18 1518             Toilet Transfer    Type (Toilet Transfer) stand pivot/stand step  -AF      Recorded by [AF] Keren Lowe OTR      Row Name 04/19/18 1518             Shower Transfer    Type (Shower Transfer) stand pivot/stand step  -AF      Recorded by [AF] Keren Lowe OTR      Row Name 04/19/18 0938             Gait/Stairs Assessment/Training     [place] Level (Gait) minimum assist (75% patient effort)  -LB      Assistive Device (Gait) walker, front-wheeled  -LB      Distance in Feet (Gait) 200   then 340. 2nd walk in AM better than the 1st.  -LB      Bilateral Gait Deviations leans left   fast pace.Mult cues to slow down Running intp objects on R  -LB      [place] Level (Stairs) contact guard  -LB      Handrail Location (Stairs) right side (ascending)  -LB      Number of Steps (Stairs) 12  -LB      Ascending Technique (Stairs) step-over-step  -LB      Descending Technique (Stairs) step-to-step  -LB      Recorded by [LB] Kera Cabrera, [facility]      Row Name 04/19/18 1518             Bathing Assessment/Treatment    Bathing [place] Level bathing skills;standby assist;verbal cues  -AF      Assistive Device (Bathing) grab bar/tub rail;shower chair;hand held shower spray hose  -AF      Bathing Position supported standing;unsupported sitting  -AF      Recorded by [AF] ANNIE Espinoza      Row Name 04/19/18 1518             Upper Body Dressing Assessment/Treatment    Upper Body Dressing Task upper body dressing skills;supervision;set up assistance  -AF      Upper Body Dressing Position unsupported sitting  -AF      Recorded by [AF] ANNIE Espinoza      Row Name 04/19/18 1518             Lower Body Dressing Assessment/Treatment    Lower Body Dressing [place] Level doff;don;pants/bottoms;shoes/slippers;socks;underwear;standby assist;verbal cues  -AF      Lower Body Dressing Position supported standing;unsupported sitting  -AF      Recorded by [AF] ANNIE Espinoza      Row Name 04/19/18 1518             Grooming Assessment/Treatment    Grooming [place] Level grooming skills;standby assist  -AF      Grooming Position unsupported standing  -AF      Recorded by [AF] ANNIE Espinoza      Row Name 04/19/18 1518             Toileting Assessment/Treatment    Toileting [place] Level toileting skills;standby assist;verbal  cues  -AF      Assistive Device Use (Toileting) grab bar/safety frame  -AF      Toileting Position unsupported sitting;supported standing  -AF      Recorded by [AF] ANNIE Espinoza      Row Name 04/19/18 1518             Hand  Strength Testing    Right Hand, Setting 2 (Dynamometer Testing) 25  -AF      Left Hand, Setting 2 (Dynamometer Testing) 20  -AF      Right Hand: Lateral (Key) Pinch Strength (Pinch Dynamometer Testing) 8  -AF      Left Hand: Lateral (Key) Pinch Strength (Pinch Dynamometer Testing) 8  -AF      Recorded by [AF] ANNIE Espinoza      Row Name 04/19/18 1518             Fine Motor Testing & Training    Fine Motor Tests 9 Hole Peg Test of Fine Motor Coordination Results  -AF      Results, Box N Block Test-Right 41  -AF      Results, Box N Block Test-Left 39  -AF      Recorded by [AF] ANNIE Espinoza      Row Name 04/19/18 1518             Vision Assessment/Intervention    Vision Assessment Comment OT retested Pt with visual tracking WFL, perpherial tested almost equally on R and L sides. slightly nystagmus noted at end range with tracking to the R side   -AF      Recorded by [AF] ANNIE Espinoza      Row Name 04/19/18 1518 04/19/18 1300 04/19/18 1000       Pain Scale: Numbers Pre/Post-Treatment    Pain Scale: Numbers, Pretreatment 0/10 - no pain  -AF 0/10 - no pain  -KB 0/10 - no pain  -KB    Recorded by [AF] ANNIE Espinoza [KB] Katherine L Bruton [KB] Katherine L Bruton    Row Name 04/19/18 0938             Pain Scale: Numbers Pre/Post-Treatment    Pain Scale: Numbers, Pretreatment 0/10 - no pain  -LB      Recorded by [LB] Kera Cabrera, HUMA      Row Name 04/19/18 1518             Upper Extremity Seated Therapeutic Exercise    Comment, Seated Upper Extremity (Therapeutic Exercise) required HEP with #2 hand weight  -AF      Recorded by [AF] ANNIE Espinoza      Row Name 04/19/18 1518             Neuromuscular Re-education    Comment (Neuromuscular  Re-education) visual scanning task placed a midline, no nelgect noted or depth perception issues noted  -AF      Recorded by [AF] ANNIE Espinoza      Row Name 04/19/18 1518             Positioning and Restraints    Pre-Treatment Position sitting in chair/recliner  -AF      Post Treatment Position wheelchair  -AF      In Wheelchair sitting;notified nsg;encouraged to call for assist;call light within reach;exit alarm on   after both sessions   -AF      Recorded by [AF] ANNIE Espinoza        User Key  (r) = Recorded By, (t) = Taken By, (c) = Cosigned By    Initials Name Effective Dates    LB Kera MOON Rick, PTA 03/07/18 -     AF ANNIE Espinoza 04/03/18 -     KB Katherine L Bruton 03/07/18 -           Wound Right groin (Active)   Dressing Appearance dry;intact 4/19/2018  8:51 AM   Care, Wound negative pressure wound therapy 4/19/2018  4:00 AM   Dressing Care, Wound foam;transparent film 4/18/2018  8:40 PM       NPWT (Negative Pressure Wound Therapy) R groin (Active)   Therapy Setting continuous therapy 4/19/2018  8:51 AM   Dressing foam, black 4/19/2018  8:51 AM   Pressure Setting 75 mmHg 4/19/2018  8:51 AM               OT IRF GOALS     Row Name 04/19/18 1523 04/12/18 1524          Transfer Goal 1 (OT-IRF)    Activity/Assistive Device (Transfer Goal 1, OT-IRF)  -- toilet;shower chair;walk-in shower;tub  -SO     [place] Level (Transfer Goal 1, OT-IRF)  -- supervision required;verbal cues required  -SO     Time Frame (Transfer Goal 1, OT-IRF)  -- long term goal (LTG);2 weeks  -SO     Progress/Outcomes (Transfer Goal 1, OT-IRF) goal met  -AF  --        LB Dressing Goal 1 (OT-IRF)    Activity/Device (LB Dressing Goal 1, OT-IRF)  -- lower body dressing  -SO     [place] (LB Dressing Goal 1, OT-IRF)  -- supervision required  -SO     Time Frame (LB Dressing Goal 1, OT-IRF)  -- long term goal (LTG);2 weeks  -SO     Progress/Outcomes (LB Dressing Goal 1, OT-IRF) goal met  -AF  --         Toileting Goal 1 (OT-IRF)    Activity/Device (Toileting Goal 1, OT-IRF)  -- toileting skills, all;grab bar/safety frame  -SO     [place] Level (Toileting Goal 1, OT-IRF)  -- supervision required  -SO     Time Frame (Toileting Goal 1, OT-IRF)  -- long term goal (LTG);2 weeks  -SO     Progress/Outcomes (Toileting Goal 1, OT-IRF) goal met  -AF  --        Strength Goal 1 (OT-IRF)    Strength Goal 1 (OT-IRF)  -- Pt to increase overall BUE strength to 4-/5 to assist with functional activities and ADLs.  -SO     Time Frame (Strength Goal 1, OT-IRF)  -- long term goal (LTG);2 weeks  -SO     Progress/Outcomes (Strength Goal 1, OT-IRF) goal not met  -AF  --        Caregiver Training Goal 1 (OT-IRF)    Caregiver Training Goal 1 (OT-IRF)  -- Pt and caregiver to be independent with AE, HEP, home safety, etc. at d/c.  -SO     Time Frame (Caregiver Training Goal 1, OT-IRF)  -- long term goal (LTG);2 weeks  -SO     Progress/Outcomes (Caregiver Training Goal 1, OT-IRF) goal met  -AF  --       User Key  (r) = Recorded By, (t) = Taken By, (c) = Cosigned By    Initials Name Provider Type    SO Margo Jacob, OTR Occupational Therapist    AF Keren Lowe OTR Occupational Therapist          Occupational Therapy Education     Title: PT OT SLP Therapies (Active)     Topic: Occupational Therapy (Resolved)     Point: ADL training (Resolved)     Description: Instruct learner(s) on proper safety adaptation and remediation techniques during self care or transfers.   Instruct in proper use of assistive devices.   Learning Progress Summary     Learner Status Readiness Method Response Comment Documented by    Patient Done Acceptance E VU pt and family participated in meeting with rehab team recommend  OT, a shower chair, supervision with ADLs and transfers. dicussed at times decreased cognition and safety awareness and visual inattention to the R. AF 04/18/18 1525     Done Acceptance E CANDI,AQUILES  and patient participated in  teaching with commode transfers, gait belt and carrying wound vac.  demos understanding and is cleared to assist pt to and from bathroom. NSG aware AF 04/14/18 1219     Done Acceptance E VU OT POC, goals SO 04/12/18 1536    Family Done Acceptance E VU,DU  and patient participated in teaching with commode transfers, gait belt and carrying wound vac.  demos understanding and is cleared to assist pt to and from bathroom. NSG aware AF 04/14/18 1219     Done Acceptance E VU OT POC, goals SO 04/12/18 1536          Point: Home exercise program (Resolved)     Description: Instruct learner(s) on appropriate technique for monitoring, assisting and/or progressing therapeutic exercises/activities.   Learning Progress Summary     Learner Status Readiness Method Response Comment Documented by    Patient Done Acceptance E,D,H VU hand weight HEP AF 04/19/18 1523     Done Acceptance E VU pt and family participated in meeting with rehab team recommend HH OT, a shower chair, supervision with ADLs and transfers. dicussed at times decreased cognition and safety awareness and visual inattention to the R. AF 04/18/18 1525          Point: Precautions (Resolved)     Description: Instruct learner(s) on prescribed precautions during self-care and functional transfers.   Learning Progress Summary     Learner Status Readiness Method Response Comment Documented by    Patient Done Acceptance E VU pt and family participated in meeting with rehab team recommend HH OT, a shower chair, supervision with ADLs and transfers. dicussed at times decreased cognition and safety awareness and visual inattention to the R. AF 04/18/18 1525          Point: Body mechanics (Resolved)     Description: Instruct learner(s) on proper positioning and spine alignment during self-care, functional mobility activities and/or exercises.   Learning Progress Summary     Learner Status Readiness Method Response Comment Documented by    Patient Done  Acceptance E VU pt and family participated in meeting with rehab team recommend  OT, a shower chair, supervision with ADLs and transfers. dicussed at times decreased cognition and safety awareness and visual inattention to the R. AF 04/18/18 1525                      User Key     Initials Effective Dates Name Provider Type Discipline    SO 04/13/15 -  Margo Jacob, OTR Occupational Therapist OT    AF 04/03/18 -  Keren Lowe, OTR Occupational Therapist OT                  OT Recommendation and Plan  Anticipated Discharge Disposition (OT):  (home with Home health)               Time Calculation:          Time Calculation- OT     Row Name 04/19/18 1525 04/19/18 1524          Time Calculation- OT    OT Start Time 1100  -AF 0900  -AF     OT Stop Time 1130  -AF 0930  -AF     OT Time Calculation (min) 30 min  -AF 30 min  -AF       User Key  (r) = Recorded By, (t) = Taken By, (c) = Cosigned By    Initials Name Provider Type    AF Keren Lowe OTSANGITA Occupational Therapist          Therapy Charges for Today     Code Description Service Date Service Provider Modifiers Qty    83222658156 HC OT SELF CARE/MGMT/TRAIN EA 15 MIN 4/18/2018 Keren Lowe OTR GO 3    14136635915 HC OT CARE PLAN EA 15 MIN 4/18/2018 Keren Lowe OTR GO 2    19406550094 HC OT THER PROC EA 15 MIN 4/18/2018 Keren Lowe OTR GO 2    20254560803 HC OT SELF CARE/MGMT/TRAIN EA 15 MIN 4/19/2018 Keren Lowe OTR GO 2    84696207362 HC OT THER PROC EA 15 MIN 4/19/2018 Keren Lowe OTR GO 1    80926172302 HC OT NEUROMUSC RE EDUCATION EA 15 MIN 4/19/2018 Keren Lowe OTSANGITA GO 1               OT Discharge Summary  Anticipated Discharge Disposition (OT):  (home with Home health)  Reason for Discharge: Maximum functional potential achieved, All goals achieved  Outcomes Achieved: Patient able to partially acheive established goals  Discharge Destination: Home with home health, Home with assist    Keren Lowe  OTR  4/19/2018

## 2018-04-19 NOTE — PROGRESS NOTES
Inpatient Rehabilitation Functional Measures Assessment and Plan of Care    Plan of Care  Updated Problems/Interventions  Field    Functional Measures  BERONICA Eating:  [facility] Grooming: [facility] Bathing:  [facility] Upper Body Dressing:  [facility] Lower Body Dressing:  [facility] Toileting:  [facility] Bladder Management  Level of Assistance:  [place]  Frequency/Number of Accidents this Shift:  [facility] Bowel Management  Level of Assistance: [place]  Frequency/Number of Accidents this Shift: [facility] Bed/Chair/Wheelchair Transfer:  [facility] Toilet Transfer:  [facility] Tub/Shower Transfer:  [place]    Previously Documented Mode of Locomotion at Discharge: Field  BERONICA Expected Mode of Locomotion at Discharge: [facility] Walk/Wheelchair:  [facility] Stairs:  [facility] Comprehension:  Auditory comprehension is the usual mode. Comprehension  Score = 7, Independent.  Patient comprehends complex/abstract information in  their primary language.  Patient is completely independent for auditory  comprehension.  There are no activity limitations.  BERONICA Expression:  Vocal expression is the usual mode. Expression Score = 6,  Modified Independent.  Patient expresses complex/abstract information in their  primary language with only mild difficulty with tasks.  BERONICA Social Interaction:  Social Interaction Score = 7, Independent. Patient is  completely independent for social interaction.  There are no activity  limitations.  [place]ONICA Problem Solving:  Patient does not make appropriate decisions in order to  solve complex problems without assistance from a helper. Problem Solving Score =  4, Minimal Direction. Patient makes appropriate decisions in order to solve  routine problems 75-90% of the time. Patient requires minimal/occasional  direction for the following behavior(s):  BERONICA Memory:  Memory Score = 3, Moderate Prompting. Patient recognizes and  remembers 50-74% of the time. Patient requires moderate/some  prompting  for  memory for the following:    Therapy Mode Minutes  Occupational Therapy: Branch  Physical Therapy: Branch  Speech Language Pathology:  Individual: 60 minutes.    Signed by: Katherine Bruton, SLP

## 2018-04-19 NOTE — PLAN OF CARE
Problem: Patient Care Overview  Goal: Plan of Care Review  Outcome: Unable to achieve outcome(s) by discharge Date Met: 04/19/18 04/19/18 3307   Patient Care Overview   IRF Plan of Care Review discharged   Progress, Functional Goals preparing for discharge   Coping/Psychosocial   Plan of Care Reviewed With patient;spouse;family   OTHER   Outcome Summary Pt continues with overall moderate cognitive deficits, notably in the areas of memory, reasoning, and thought organization. Pt also with mild expressive language difficulties, verbally and in writing. She presents with paraphasias at times and has difficulty with spelling out single words when writing. She will continue to recieve ST through home health upon d/c. Family has been educated on progress and current status, and HEP was provided for pt.

## 2018-04-19 NOTE — PROGRESS NOTES
SECTION GG      Self Care Performance Discharge:   Oral Hygiene: Patient completed the activities by him/herself with no  assistance from a helper.   Toileting Hygiene: : [place] provides verbal cues or touching/steadying  assistance as patient completes activity.   Shower/Bathe Self: [place] provides verbal cues or touching/steadying assistance  as patient completes activity.   Upper Body Dressing: [place] sets up or cleans up; patient completes activity.  [place] assists only prior to or following the activity.   Lower Body Dressing: [place] provides verbal cues or touching/steadying  assistance as patient completes activity.   Putting On/Taking Off Footwear: [place] sets up or cleans up; patient completes  activity. [place] assists only prior to or following the activity.    Mobility Toilet Transfer Discharge: [place] provides verbal cues or  touching/steadying assistance as patient completes activity.    Signed by: Keren Lowe, OT

## 2018-04-19 NOTE — PROGRESS NOTES
Inpatient Rehabilitation Plan of Care Note    Plan of Care  Updated Problems/Interventions  Medical Problem(s)    BNE (Active)  Att'n. - Mildly Imp.  Exec. Fx. - Mod. Imp.  Rsng/Jgmnt - Mildly Imp.  Arith - WNL  Visuospatial Skills - Mildly Imp.  Visual Mem. - Mildly Imp.  Verbal Mem. - Severely Imp.  Emot - Pt denied emotional distress    Signed by: Carmine Walton Psy.d

## 2018-04-19 NOTE — PROGRESS NOTES
Inpatient Rehabilitation Functional Measures Assessment    Functional Measures  BERONICA Eating:  [facility] Grooming: [facility] Bathing:  [facility] Upper Body Dressing:  [facility] Lower Body Dressing:  [facility] Toileting:  [facility] Bladder Management  Level of Assistance:  [place]  Frequency/Number of Accidents this Shift:  [facility] Bowel Management  Level of Assistance: [place]  Frequency/Number of Accidents this Shift: [facility] Bed/Chair/Wheelchair Transfer:  [facility] Toilet Transfer:  [facility] Tub/Shower Transfer:  [place]    Previously Documented Mode of Locomotion at Discharge: Field  BERONICA Expected Mode of Locomotion at Discharge: [facility] Walk/Wheelchair:  [facility] Stairs:  [facility] Comprehension:  Auditory comprehension is the usual mode. Comprehension  Score = 6, Modified [place].  Patient comprehends complex/abstract  information in their primary language, requiring: Additional time.  BERONICA Expression:  Vocal expression is the usual mode. Expression Score = 6,  Modified Independent.  Patient expresses complex/abstract information in their  primary language, requiring: Additional time.  BERONICA Social Interaction:  Social Interaction Score = 6, Modified Independent.  Patient is modified independent for social interaction, requiring: Requires  additional time.  [place]ONICA Problem Solving:  Activity was not observed.  BERONICA Memory:  Memory Score = 6, Modified [place].  Patient is modified  independent for memory, requiring: Requires additional time.    Therapy Mode Minutes  Occupational Therapy: Branch  Physical Therapy: Branch  Speech Language Pathology:  Branch    Signed by: Miya Newman RN

## 2018-04-19 NOTE — PROGRESS NOTES
Inpatient Rehabilitation Functional Measures Assessment and Plan of Care    Plan of Care  Updated Problems/Interventions  Field    Functional Measures  BERONICA Eating:  [place]  BERONICA Grooming: [facility] Bathing:  [facility] Upper Body Dressing:  [facility] Lower Body Dressing:  [facility] Toileting:  [facility] Bladder Management  Level of Assistance:  [place]  Frequency/Number of Accidents this Shift:  [facility] Bowel Management  Level of Assistance: [place]  Frequency/Number of Accidents this Shift: [facility] Bed/Chair/Wheelchair Transfer:  Activity was not observed.  BERONICA Toilet Transfer:  [facility] Tub/Shower Transfer:  [place]    Previously Documented Mode of Locomotion at Discharge: Field  BERONICA Expected Mode of Locomotion at Discharge: [facility] Walk/Wheelchair:  WHEELCHAIR OBSERVATION   Activity was not observed.    WALK OBSERVATION   Walk Distance Scale = 3.  Distance walked is greater than 150 feet. Walk Score  = 4.  Patient performs 75% or more of effort and requires minimal assistance.  Incidental help/contact guard/steadying was provided. Patient walked a distance  of  200 feet. Patient requires the following assistive device(s): Rolling  walker.  BERONICA Stairs:  Stairs Score = 4.  Incidental help/contact guard/steadying was  provided. Patient performs 75% or more of effort and requires minimal  assistance. Patient negotiated 12 stairs. Patient requires the following  assistive device(s): Handrail(s).    BERONICA Comprehension:  [facility] Expression:  [facility] Social Interaction:  [facility] Problem Solving:  [facility] Memory:  [place]    Therapy Mode Minutes  Occupational Therapy: [place]  Physical Therapy: Individual: 60 minutes.  Speech Language Pathology:  [place]    Signed by: Kera Cabrera PTA

## 2018-04-19 NOTE — SIGNIFICANT NOTE
04/19/18 1330   SLP Discharge Summary   Anticipated Dischage Disposition unknown   Reason for Discharge discharge from this facility   Progress Toward Achieving Short/long Term Goals goals not met within established timelines   Discharge Destination home w/ home health

## 2018-04-19 NOTE — PROGRESS NOTES
Case Management  Inpatient Rehabilitation Plan of Care and Discharge Plan Note    Rehabilitation Diagnosis:  Branch  Date of Onset:  Branch    Medical Summary:  Branch  Past Medical History: Branch    Plan of Care  Updated Problems/Interventions  Field    Expected Intensity:  Branch  Interdisciplinary Team:  Jess  Estimated Length of Stay/Anticipated Discharge Date: Branch  Anticipated Discharge Destination:  Anticipated discharge destination from inpatient rehabilitation is community  discharge with assistance. Patient lives with  in tri-level home. 3 steps  to enter front. Bedroom and bathroom are upstairs. 6 steps up to that level.  Family conference held on 4/18 with patient, , son, and daughter-in-law.  Other son and daughter on speaker phone for conference.  D/C plan is home with . Daughter coming from [place] for a week to assist.  [facility] to provide home PT, OT, ST, NSG.(IV antibiotics and  care of wound vac)      Based on the patient's medical and functional status, their prognosis and  expected level of functional improvement is:  Jess    Signed by: KALINA Gutierrez

## 2018-04-19 NOTE — PROGRESS NOTES
Inpatient Rehabilitation Functional Measures Assessment    Functional Measures  BERONICA Eating:  Branch  [facility] Grooming: Branch  [facility] Bathing:  Branch  [facility] Upper Body Dressing:  Branch  [facility] Lower Body Dressing:  Branch  [facility] Toileting:  Toileting Score = 4.  Patient requires minimal assistance for  toileting, such as steadying for balance while cleansing or adjusting clothes.  Patient requires the following assistive device(s): Adaptive device to maintain  balance.    BERONICA Bladder Management  Level of Assistance:  Bladder Score = 2. Patient performs 25-49% of tasks and  requires maximal assistance for bladder management requiring assistive  device/method: bathroom .  Frequency/Number of Accidents this Shift:  Bladder accidents this shift:  0 .  Patient has not had an accident this shift.    BERONICA Bowel Management  Level of Assistance: Activity was not observed.  Frequency/Number of Accidents this Shift: Bowel accidents this shift: 0 .  Patient has not had an accident this shift.    BERONICA Bed/Chair/Wheelchair Transfer:  Bed/chair/wheelchair Transfer Score = 4.  Patient performs 75% or more of effort and minimal assistance (little/incidental  help/lifting of one limb/steadying) for transferring to and from the  bed/chair/wheelchair, requiring: Patient requires the following assistive  device(s): Elevated head of bed. Elevated bed/surface. Bed rails.  BERONICA Toilet Transfer:  Toilet Transfer Score = 2.  Patient performs 25-49% of  effort and requires maximal assistance (most of the lifting) for transferring to  and from the toilet/commode. Patient requires the following assistive device(s):  Grab bars.  BERONICA Tub/Shower Transfer:  Branch    Previously Documented Mode of Locomotion at Discharge: Field  BERONICA Expected Mode of Locomotion at Discharge: [facility] Walk/Wheelchair:  Branch  [facility] Stairs:  Branch    [facility] Comprehension:  Branch  [facility] Expression:  Branch  [facility] Social Interaction:  Branch  [facility] Problem Solving:  Branch  [facility] Memory:   Branch    Therapy Mode Minutes  Occupational Therapy: Branch  Physical Therapy: Branch  Speech Language Pathology:  Branch    Signed by: RUTH Fitzpatrick

## 2018-04-19 NOTE — THERAPY DISCHARGE NOTE
Inpatient Rehabilitation - Speech Language Pathology /Discharge  [facility]     Patient Name: Herlinda Ta  : 1940  MRN: [id number]  Today's Date: 2018         Admit Date: 2018  Discharge postponed until Monday, 18. Pt will be seen for therapy 18.   Visit Dx:     ICD-10-CM ICD-9-CM   1. Subarachnoid hemorrhage I60.9 430     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   • Cerebral vasospasm   • Cervical pain (neck)   • Depressed   • Depression   • Dyspepsia   • Acid reflux   • Groin hematoma   • HTN, goal below 140/90   • HLD (hyperlipidemia)   • Infection of artery   • Iron deficiency anemia   • MRSA (methicillin resistant staph aureus) culture positive   • Rheumatoid arthritis   • Urinary incontinence, mixed   • Subarachnoid hemorrhage        Therapy Treatment    Therapy Treatment / Health Promotion    Treatment Time/Intention  Document Type: discharge treatment (18 1300 : Katherine L Bruton)  Mode of Treatment: speech-language pathology, individual therapy (18 1300 : Katherine L Bruton)  Patient/Family Observations: Pt alert and cooperative. No complaints. SLP provided HEP for d/c.  (18 1300 : Katherine L Bruton)    Vitals/Pain/Safety  Pain Scale: Numbers Pre/Post-Treatment  Pain Scale: Numbers, Pretreatment: 0/10 - no pain (18 1300 : Katherine L Bruton)    Cognition, Communication, Swallow       Outcome Summary           SLP GOALS     Row Name 18 1300 18 1000 18 1500       Memory Skills Goal 1 (SLP)    Improve Memory Skills Through Goal 1 (SLP) recalling unrelated word lists immediately  -KB  -- recalling unrelated word lists immediately   4-words urelated  -KB    Progress (Memory Skills Goal 1, SLP)  --  -- 60%;independently (over  90% accuracy);90%;with moderate cues (50-74%)  -KB    Progress/Outcomes (Memory Skills Goal 1, SLP) discharged from facility;goal not met  -KB  -- goal ongoing  -KB       Memory Skills Goal 2 (SLP)    Improve Memory Skills Through Goal 2 (SLP) repeat list in sequential order  -KB repeat list in sequential order   3-word progression  -KB  --    Progress (Memory Skills Goal 2, SLP)  -- 50%;independently (over 90% accuracy);70%;with minimal cues (75-90%);100%;with maximum cues (25-49%)  -KB  --    Progress/Outcomes (Memory Skills Goal 2, SLP) discharged from facility;goal not met  -KB goal ongoing  -KB  --       Memory Skills Goal (SLP)    Improve Memory Skills Through Goal (SLP) visual memory: CT Slapjack L2  -KB read paragraph and answer questions  -KB read paragraph and answer questions: short functional meliza  -KB    Progress (Memory Skills Goal, SLP) 90%;independently (over 90% accuracy)  -KB 40%  -KB 70%  -KB    Progress/Outcomes (Memory Skills Goal, [facility]) discharged from facility;goal met  -KB goal ongoing  -KB goal ongoing  -KB    Comment (Memory Skills Goal, SLP)  -- MOD-MAX cues required to recall information read in a paragraph. Initial cues for strategies: repetition and underlining.   -KB  --       Organizational Skills Goal 1 (SLP)    Improve Thought Organization Through Goal 1 (SLP) completing a verbal sequencing task  -KB  --  --    Progress/Outcomes (Thought Organization Skills Goal 1, SLP) discharged from facility;goal not met  -KB  --  --       Organizational Skills Goal 2 (SLP)    Improve Thought Organization Through Goal 2 (SLP) completing a divergent naming task  -KB completing a divergent naming task   items in concrete category: sports  -KB completing a divergent naming task   completion of 4x4 matrix given category and initial letter  -KB    Progress (Thought Organization Skills Goal 2, SLP)  --  -- 80%;independently (over 90% accuracy);100%;with minimal cues (75-90%)  -KB    Progress/Outcomes  (Thought Organization Skills Goal 2, SLP) discharged from facility;goal not met  -KB goal ongoing  -KB good progress toward goal;goal ongoing  -KB    Comment (Thought Organization Skills Goal 2, SLP)  -- 4 in 1 minute with perseveration; extra time and MOD cues for up to 7  -KB  --       Reasoning Goal 1 (SLP)    Improve Reasoning Through Goal 1 (SLP) complete deductive reasoning task  -KB  --  --    Progress/Outcomes (Reasoning Goal 1, [facility]) discharged from facility;goal not met  -KB  --  --       Reasoning Goal (SLP)    Improve Reasoning Through Goal (SLP) mildly-complex word puzzle  -KB deductive reasoning/logic puzzle: [employer]  -KB  --    Progress (Reasoning Goal, SLP) 50%;independently (over 90% accuracy);70%;with minimal cues (75-90%);90%;with maximum cues (25-49%)  -KB 50%;independently (over 90% accuracy);100%;with moderate cues (50-74%)  -KB  --    Progress/Outcomes (Reasoning Goal, SLP) discharged from facility;goal not met  -KB goal ongoing  -KB  --       Executive Functional Skills Goal 1 (SLP)    Improve Executive Function Skills Goal 1 (SLP) exhibit cognitive flexibility  -KB  --  --    Progress/Outcomes (Executive Function Skills Goal 1, SLP) discharged from facility;goal not met  -KB  --  --    Row Name 04/18/18 1000 04/17/18 1300 04/17/18 1000       Memory Skills Goal 1 (SLP)    Improve Memory Skills Through Goal 1 (SLP)  -- recalling related word lists with an imposed delay   recall list of 4 words in same category  -KB recalling unrelated word lists immediately   4-word list   -KB    Progress (Memory Skills Goal 1, SLP)  -- 60%;independently (over 90% accuracy);100%;with moderate cues (50-74%)  -KB 30%;independently (over 90% accuracy);90%;with moderate cues (50-74%)  -KB    Progress/Outcomes (Memory Skills Goal 1, SLP)  -- continuing progress toward goal;goal ongoing  -KB progress slower than expected  -KB       Memory Skills Goal (SLP)    Improve Memory Skills Through Goal (SLP)  --  --  visual memory task   CT SlapJack L2  -KB    Progress (Memory Skills Goal, SLP)  --  -- 70%;with minimal cues (75-90%)  -KB    Progress/Outcomes (Memory Skills Goal, SLP)  --  -- goal ongoing  -KB       Organizational Skills Goal 2 (SLP)    Improve Thought Organization Through Goal 2 (SLP)  -- completing a divergent naming task   abstract-expensive items; concrete-vegetables  -KB  --    Progress (Thought Organization Skills Goal 2, SLP)  -- other (comment)   4 items in each category in 1 minute  -KB  --    Progress/Outcomes (Thought Organization Skills Goal 2, SLP)  -- goal ongoing  -KB  --    Comment (Thought Organization Skills Goal 2, SLP)  -- Able to name up to 3 more items in a concrete category given additional time and verbal cue  -KB  --       Organizational Skills Goal (SLP)    Improve Thought Organization Through Goal (SLP) functional pill sorting task  -KB sorting items into 4 concrete categories  -KB  --    Progress (Thought Organization Skills Goal, SLP) with moderate cues (50-74%)   5 cues needed to complete 5 items  -KB 60%;independently (over 90% accuracy);100%;with moderate cues (50-74%)  -KB  --    Progress/Outcomes (Thought Organization Skills Goal, SLP) goal ongoing  -KB goal ongoing  -KB  --    Comment (Thought Organization Skills Goal, SLP) Pt required MOD verbal and visual cues to complete task with 100% accuracy. Errors were made in finding the correct medication and placement in pill organizer.   -KB Performance declined since previous session-cause unknown.   -KB  --       Reasoning Goal 1 (SLP)    Improve Reasoning Through Goal 1 (SLP)  -- complete deductive reasoning task   category deduction given four items  -KB  --    Progress (Reasoning Goal 1, SLP)  -- 70%;independently (over 90% accuracy);100%;with minimal cues (75-90%)  -KB  --    Progress/Outcomes (Reasoning Goal 1, SLP)  -- goal ongoing  -KB  --       Reasoning Goal (SLP)    Improve Reasoning Through Goal (SLP)  --  -- diagnostic  treatment: mod-complex word puzzle  -KB    Progress (Reasoning Goal, SLP)  --  -- 60%;independently (over 90% accuracy);100%;with moderate cues (50-74%)  -KB    Progress/Outcomes (Reasoning Goal, SLP)  --  -- goal ongoing  -KB       Executive Functional Skills Goal 1 (SLP)    Improve Executive Function Skills Goal 1 (SLP) exhibit cognitive flexibility   stating similarities and differences  -KB  --  --    Progress (Executive Function Skills Goal 1, SLP) 40%;independently (over 90% accuracy);100%;with moderate cues (50-74%)  -KB  --  --    Progress/Outcomes (Executive Function Skills Goal 1, SLP) goal ongoing  -KB  --  --    Row Name 04/16/18 1500             Memory Skills Goal (SLP)    Improve Memory Skills Through Goal (SLP) visual memory task   CT Slap Dain L2  -KB      Progress (Memory Skills Goal, SLP) 30%;independently (over 90% accuracy)  -KB      Progress/Outcomes (Memory Skills Goal, SLP) goal ongoing  -KB      Comment (Memory Skills Goal, SLP) Pt required cues for sustained attention and memory for stimuli. Dificulty maintaining alertness during this task, abandoned before completing d/t lack of alertness.   -KB         Executive Functional Skills Goal 1 (SLP)    Improve Executive Function Skills Goal 1 (SLP) exhibit cognitive flexibility   providing two definitions for words  -KB      Progress (Executive Function Skills Goal 1, SLP) 60%;independently (over 90% accuracy);90%;with moderate cues (50-74%)  -KB      Progress/Outcomes (Executive Function Skills Goal 1, SLP) goal ongoing  -KB        User Key  (r) = Recorded By, (t) = Taken By, (c) = Cosigned By    Initials Name Provider Type    KB Katherine L Bruton Speech and Language Pathologist          EDUCATION  The patient has been educated in the following areas:   Cognitive Impairment.    SLP Recommendation and Plan                                           Time Calculation:         Time Calculation- SLP     Row Name 04/19/18 1329 04/19/18 1030           Time Calculation- SLP    SLP Start Time 1300  -KB 1000  -KB     SLP Stop Time 1330  -KB 1030  -KB     SLP Time Calculation (min) 30 min  -KB 30 min  -KB       User Key  (r) = Recorded By, (t) = Taken By, (c) = Cosigned By    Initials Name Provider Type    KB Katherine L Bruton Speech and Language Pathologist          Therapy Charges for Today     Code Description Service Date Service Provider Modifiers Qty    [id number] HC ST DEV OF COGN SKILLS EACH 15 MIN 4/18/2018 Katherine L Bruton  4    [id number] HC ST DEV OF COGN SKILLS EACH 15 MIN 4/19/2018 Katherine L Bruton  4                    Katherine L Bruton  4/19/2018

## 2018-04-19 NOTE — PROGRESS NOTES
Inpatient Rehabilitation Functional Measures Assessment and Plan of Care    Plan of Care  Updated Problems/Interventions  Mobility    [PT] Bed/Chair/Wheelchair(Active)  Current Status(04/19/2018): SBA  Weekly Goal(04/20/2018): SBA  Discharge Goal: supervision    [PT] Walk(Active)  Current Status(04/19/2018): varies: CGA/SBA. Thurs morning maritza. Rwx, 200ft  Weekly Goal(04/20/2018): SBA to BR rwx  Discharge Goal: ' rwx    [PT] Bed Mobility(Active)  Current Status(04/19/2018): supervision  Weekly Goal(04/20/2018): indep  Discharge Goal: independent    [PT] Stairs(Active)  Current Status(04/19/2018): 12, cga with 1 rail  Weekly Goal(04/20/2018): PT only  Discharge Goal: supervision 12 w/HRs    Functional Measures  BERONICA Eating:  Branch  BERONICA Grooming: Branch  BERONICA Bathing:  Branch  BERONICA Upper Body Dressing:  Branch  [facility] Lower Body Dressing:  Branch  BERONICA Toileting:  Branch    BERONICA Bladder Management  Level of Assistance:  Branch  Frequency/Number of Accidents this Shift:  Branch    BERONICA Bowel Management  Level of Assistance: Branch  Frequency/Number of Accidents this Shift: Branch    BERONICA Bed/Chair/Wheelchair Transfer:  Branch  BERONICA Toilet Transfer:  Branch  BERONICA Tub/Shower Transfer:  Branch    Previously Documented Mode of Locomotion at Discharge: Field  [facility] Expected Mode of Locomotion at Discharge: Branch  BERONICA Walk/Wheelchair:  Branch  [facility] Stairs:  Branch    BERONICA Comprehension:  Branch  BERONICA Expression:  Branch  [facility] Social Interaction:  Branch  [facility] Problem Solving:  Branch  [facility] Memory:  Branch    Therapy Mode Minutes  Occupational Therapy: Branch  Physical Therapy: Branch  Speech Language Pathology:  Branch    Signed by: Kera Cabrera PTA

## 2018-04-19 NOTE — PLAN OF CARE
Problem: Skin Injury Risk (Adult)  Goal: Skin Health and Integrity  Outcome: Ongoing (interventions implemented as appropriate)   04/19/18 0113   Skin Injury Risk (Adult)   Skin Health and Integrity making progress toward outcome       Problem: Fall Risk (Adult)  Goal: Absence of Fall  Outcome: Ongoing (interventions implemented as appropriate)   04/19/18 0113   Fall Risk (Adult)   Absence of Fall making progress toward outcome       Problem: Mobility, Physical Impaired (Adult)  Goal: Enhanced Mobility Skills  Outcome: Ongoing (interventions implemented as appropriate)   04/19/18 0113   Mobility, Physical Impaired (Adult)   Enhanced Mobility Skills making progress toward outcome

## 2018-04-19 NOTE — PROGRESS NOTES
Referral made to [facility] for home wound vac as [facility] rep informed me today that they are not an in network provider for patient's insurance and would have large co-pay for out of network provider. Lu,  for [facility], to get home wound vac arranged and get order signed by patient's surgeon. Also discussed with our wound care nurse who will talk with [facility] evangelista as well. Discussed co-pay for IV antibiotic also with patient. D/C planned for tomorrow afternoon after IV antibiotic given here. [facility] to follow at home.

## 2018-04-20 LAB
ANION GAP SERPL CALCULATED.3IONS-SCNC: 9.4 MMOL/L
BUN BLD-MCNC: 14 MG/DL (ref 8–23)
BUN/CREAT SERPL: 15.7 (ref 7–25)
CALCIUM SPEC-SCNC: 8.1 MG/DL (ref 8.6–10.5)
CHLORIDE SERPL-SCNC: 105 MMOL/L (ref 98–107)
CO2 SERPL-SCNC: 22.6 MMOL/L (ref 22–29)
CREAT BLD-MCNC: 0.89 MG/DL (ref 0.57–1)
GFR SERPL CREATININE-BSD FRML MDRD: 62 ML/MIN/1.73
GLUCOSE BLD-MCNC: 87 MG/DL (ref 65–99)
POTASSIUM BLD-SCNC: 3.4 MMOL/L (ref 3.5–5.2)
SODIUM BLD-SCNC: 137 MMOL/L (ref 136–145)

## 2018-04-20 PROCEDURE — 97112 NEUROMUSCULAR REEDUCATION: CPT

## 2018-04-20 PROCEDURE — 97110 THERAPEUTIC EXERCISES: CPT

## 2018-04-20 PROCEDURE — 97535 SELF CARE MNGMENT TRAINING: CPT

## 2018-04-20 PROCEDURE — 80048 BASIC METABOLIC PNL TOTAL CA: CPT | Performed by: PHYSICAL MEDICINE & REHABILITATION

## 2018-04-20 PROCEDURE — 94799 UNLISTED PULMONARY SVC/PX: CPT

## 2018-04-20 PROCEDURE — 25010000003 CEFTRIAXONE PER 250 MG: Performed by: PHYSICAL MEDICINE & REHABILITATION

## 2018-04-20 PROCEDURE — G0515 COGNITIVE SKILLS DEVELOPMENT: HCPCS

## 2018-04-20 RX ADMIN — LEVETIRACETAM 1000 MG: 500 TABLET, FILM COATED ORAL at 21:40

## 2018-04-20 RX ADMIN — METRONIDAZOLE 500 MG: 500 TABLET, FILM COATED ORAL at 05:36

## 2018-04-20 RX ADMIN — CEFTRIAXONE SODIUM 2 G: 2 INJECTION, SOLUTION INTRAVENOUS at 13:50

## 2018-04-20 RX ADMIN — MONTELUKAST 10 MG: 10 TABLET, FILM COATED ORAL at 08:49

## 2018-04-20 RX ADMIN — LACOSAMIDE 100 MG: 100 TABLET, FILM COATED ORAL at 21:40

## 2018-04-20 RX ADMIN — ESCITALOPRAM 20 MG: 20 TABLET, FILM COATED ORAL at 08:49

## 2018-04-20 RX ADMIN — METRONIDAZOLE 500 MG: 500 TABLET, FILM COATED ORAL at 13:50

## 2018-04-20 RX ADMIN — LEVETIRACETAM 1000 MG: 500 TABLET, FILM COATED ORAL at 10:38

## 2018-04-20 RX ADMIN — HYDRALAZINE HYDROCHLORIDE 10 MG: 10 TABLET, FILM COATED ORAL at 21:40

## 2018-04-20 RX ADMIN — METRONIDAZOLE 500 MG: 500 TABLET, FILM COATED ORAL at 21:40

## 2018-04-20 RX ADMIN — PANTOPRAZOLE SODIUM 40 MG: 40 TABLET, DELAYED RELEASE ORAL at 05:36

## 2018-04-20 RX ADMIN — Medication 1 TABLET: at 08:49

## 2018-04-20 RX ADMIN — PYRIDOXINE HCL TAB 50 MG 50 MG: 50 TAB at 08:49

## 2018-04-20 RX ADMIN — VITAMIN D, TAB 1000IU (100/BT) 1000 UNITS: 25 TAB at 08:49

## 2018-04-20 RX ADMIN — CLOPIDOGREL 75 MG: 75 TABLET, FILM COATED ORAL at 08:49

## 2018-04-20 RX ADMIN — FOLIC ACID 1 MG: 1 TABLET ORAL at 08:49

## 2018-04-20 RX ADMIN — ASPIRIN 325 MG: 325 TABLET ORAL at 08:49

## 2018-04-20 RX ADMIN — OXYCODONE HYDROCHLORIDE AND ACETAMINOPHEN 500 MG: 500 TABLET ORAL at 08:49

## 2018-04-20 RX ADMIN — CETIRIZINE HYDROCHLORIDE 5 MG: 10 TABLET, FILM COATED ORAL at 08:49

## 2018-04-20 RX ADMIN — HYDRALAZINE HYDROCHLORIDE 10 MG: 10 TABLET, FILM COATED ORAL at 13:50

## 2018-04-20 RX ADMIN — HYDRALAZINE HYDROCHLORIDE 10 MG: 10 TABLET, FILM COATED ORAL at 05:36

## 2018-04-20 RX ADMIN — LACOSAMIDE 100 MG: 100 TABLET, FILM COATED ORAL at 08:52

## 2018-04-20 RX ADMIN — OXYCODONE HYDROCHLORIDE AND ACETAMINOPHEN 1 TABLET: 5; 325 TABLET ORAL at 16:53

## 2018-04-20 NOTE — THERAPY TREATMENT NOTE
"Inpatient Rehabilitation - Occupational Therapy Treatment Note    [facility]     Patient Name: Herlinda Ta  : 1940  MRN: [id number]    Today's Date: 2018                 Admit Date: 2018      Visit Dx:    ICD-10-CM ICD-9-CM   1. 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   • Cerebral vasospasm   • Cervical pain (neck)   • Depressed   • Depression   • Dyspepsia   • Acid reflux   • Groin hematoma   • HTN, goal below 140/90   • HLD (hyperlipidemia)   • Infection of artery   • Iron deficiency anemia   • MRSA (methicillin resistant staph aureus) culture positive   • Rheumatoid arthritis   • Urinary incontinence, mixed   • Subarachnoid hemorrhage         Therapy Treatment          IRF Treatment Summary     Row Name 18 1553 18 1000 18 0930       Evaluation/Treatment Time and Intent    Document Type therapy note (daily note)  -AF therapy note (daily note)  -KB therapy note (daily note)  -LB    Mode of Treatment occupational therapy  -AF speech-language pathology;individual therapy  -KB physical therapy  -LB    Patient/Family Observations pt didn't discharge home today, per MD will d/c on monday. this is addendum to yesterdays d/c note   -AF Pt alert and agreeable to therapy. No complaints.  -KB Agreed to PT. \"broken-hearted\" that she will not be discharged today.  -LB    Start Time (Evaluation/Treatment)  -- 1000  -KB  --    Stop Time (Evaluation/Treatment)  -- 1030  -KB  --    Recorded by [AF] ANNIE Espinoza [KB] Katherine L Bruton [LB] Kera Cabrera PTA    Row Name 18 0800             Evaluation/Treatment Time and Intent    Document Type therapy note (daily note)  -KB      Mode of Treatment speech-language " pathology;individual therapy  -KB      Patient/Family Observations Pt states she is sad that she is not going home today. Agreeable to therapy. Alert and cooperative.   -KB      Start Time (Evaluation/Treatment) 0800  -KB      Stop Time (Evaluation/Treatment) 0830  -KB      Recorded by [KB] Katherine L Bruton      Row Name 04/20/18 1553             Cognition/Psychosocial- PT/OT    Orientation Status (Cognition) oriented to;person;place  -AF      Follows Commands (Cognition) follows one step commands;75-90% accuracy  -AF      Cognitive Function (Cognitive) safety deficit  -AF      Safety Deficit (Cognitive) mild deficit   pt stated that she didn't need her walker to walk   -AF      Recorded by [AF] ANNIE Espinoza      Row Name 04/20/18 1553 04/20/18 0930          Bed Mobility Assessment/Treatment    Supine-Sit [place] (Bed Mobility) supervision  -AF supervision  -LB     Assistive Device (Bed Mobility)  -- bed rails;head of bed elevated  -LB     Recorded by [AF] ANNIE Espinoza [LB] Kera Cabrera PTA     Row Name 04/20/18 1553 04/20/18 0930          Transfer Assessment/Treatment    Bed-Chair [place] (Transfers) stand by assist  -AF stand by assist  -LB     Chair-Bed [place] (Transfers) stand by assist  -AF  --     Assistive Device (Bed-Chair Transfers) walker, front-wheeled  -AF  --     Sit-Stand [place]ppner (Transfers) stand by assist  -AF stand by assist  -LB     Stand-Sit [place]pp[place] (Transfers) stand by assist  -AF stand by assist  -LB     [place]ppner Level (Toilet Transfer) verbal cues;stand by assist  -AF  --     Assistive Device (Toilet Transfer) commode;grab bars/safety frame;walker, front-wheeled  -AF  --     [place] Level (Shower Transfer) verbal cues;stand by assist  -AF  --     Assistive Device (Shower Transfer) shower chair;walker, front-wheeled;grab bars/tub rail  -AF  --     Recorded by [AF] Keren Lowe OTR [LB] Kera Cabrera PTA     Row Name 04/20/18  1553             Chair-Bed Transfer    Assistive Device (Chair-Bed Transfers) walker, front-wheeled  -AF      Recorded by [AF] Keren Lowe, OTR      Row Name 04/20/18 0930             Sit-Stand Transfer    Assistive Device (Sit-Stand Transfers) walker, front-wheeled  -LB      Recorded by [LB] Kera Cabrera [facility]      Row Name 04/20/18 0930             Stand-Sit Transfer    Assistive Device (Stand-Sit Transfers) walker, front-wheeled  -LB      Recorded by [LB] Kera Cabrera, [facility]      Row Name 04/20/18 1553             Toilet Transfer    Type (Toilet Transfer) stand pivot/stand step  -AF      Recorded by [AF] Keren Lowe OTR      Row Name 04/20/18 1553             Shower Transfer    Type (Shower Transfer) stand pivot/stand step  -AF      Recorded by [AF] Keren Lowe, OTR      Row Name 04/20/18 0930             Gait/Stairs Assessment/Training    Angelina Level (Gait) contact guard;stand by assist  -LB      Assistive Device (Gait) walker, front-wheeled  -LB      Distance in Feet (Gait) 260  -LB      Pattern (Gait) step-through  -LB      Deviations/Abnormal Patterns (Gait) --   cues to scan right  -LB      Right Sided Gait Deviations heel strike decreased  -LB      Angelina Level (Stairs) contact guard  -LB      Handrail Location (Stairs) right side (ascending)  -LB      Number of Steps (Stairs) 12  -LB      Ascending Technique (Stairs) step-over-step  -LB      Descending Technique (Stairs) step-to-step  -LB      Comment (Gait/Stairs) curb, rwx, cga, vc's  -LB      Recorded by [LB] Kera Cabrera, [facility]      Row Name 04/20/18 1553             Safety Issues, Functional Mobility    Comment, Safety Issues/Impairments (Mobility) pt walked to shower from EOB with RWX and vc's to slow her pace  -AF      Recorded by [AF] Keren Lowe, OTR      Row Name 04/20/18 1553             Bathing Assessment/Treatment    Bathing Angelina Level bathing skills;supervision;verbal cues  -AF       Assistive Device (Bathing) grab bar/tub rail;hand held shower spray hose;shower chair  -AF      Bathing Position supported standing;unsupported sitting  -AF      Recorded by [AF] ANNIE Espinoza      Row Name 04/20/18 1553             Upper Body Dressing Assessment/Treatment    Upper Body Dressing Task upper body dressing skills;supervision  -AF      Upper Body Dressing Position unsupported sitting  -AF      Recorded by [AF] ANNIE Espinoza      Row Name 04/20/18 1553             Lower Body Dressing Assessment/Treatment    Lower Body Dressing Gepp Level doff;don;pants/bottoms;shoes/slippers;socks;underwear;supervision  -AF      Lower Body Dressing Position supported standing;unsupported sitting  -AF      Recorded by [AF] ANNIE Espinoza      Row Name 04/20/18 1553             Grooming Assessment/Treatment    Grooming Gepp Level grooming skills;supervision  -AF      Grooming Position unsupported sitting;supported standing  -AF      Recorded by [AF] ANNIE Espinoza      Row Name 04/20/18 1553             Toileting Assessment/Treatment    Toileting Gepp Level toileting skills;supervision  -AF      Assistive Device Use (Toileting) grab bar/safety frame;raised toilet seat  -AF      Toileting Position unsupported sitting;supported standing  -AF      Recorded by [AF] ANNIE Espinoza      Row Name 04/20/18 1553 04/20/18 1000 04/20/18 0930       Pain Scale: Numbers Pre/Post-Treatment    Pain Scale: Numbers, Pretreatment 0/10 - no pain  -AF 0/10 - no pain  -KB 0/10 - no pain  -LB    Recorded by [AF] ANNIE Espinoza [KB] Katherine L Bruton [LB] Kera Cabrera, [facility]    Row Name 04/20/18 0800             Pain Scale: Numbers Pre/Post-Treatment    Pain Scale: Numbers, Pretreatment 0/10 - no pain  -KB      Recorded by [KB] Katherine L Bruton      Row Name 04/20/18 1553             Static Standing Balance    Level of Gepp (Supported Standing, Static Balance)  standby assist  -AF      Time Able to Maintain Position (Supported Standing, Static Balance) 4 to 5 minutes  -AF      Comment (Supported Standing, Static Balance) pt stood to participate in visual scanning task and reaching outside her base of support to increased standing balance and endurance for IADL tasks   -AF      Recorded by [AF] ANNIE Espinoza      Row Name 04/20/18 0968             Sitting Balance Activity    Activities Performed (Sitting, Balance Training) sitting on ball, multi-directional weight shift  -LB      Support Needed (Sitting, Balance Training) minimal external support for balance, 75% patient effort;balances without upper extremity support   vc's. Used mirror alao  -LB      Recorded by [LB] Kera Cabrera PTA      Row Name 04/20/18 0974             Dynamic Balance Activity    Therapeutic Training Performed (Dynamic Balance) obstacle course   amb 24 ft n uneven surface, rwx, cga, vc's  -LB      Recorded by [LB] Kera Cabrera PTA      Row Name 04/20/18 6250             Upper Extremity Seated Therapeutic Exercise    Performed, Seated Upper Extremity (Therapeutic Exercise) elbow flexion/extension;wrist flexion/extension;scapular protraction/retraction  -AF      Device, Seated Upper Extremity (Therapeutic Exercise) --   hand weight #2  -AF      Exercise Type, Seated Upper Extremity (Therapeutic Exercise) AROM (active range of motion);resistive exercise  -AF      Expected Outcomes, Seated Upper Extremity (Therapeutic Exercise) improve functional tolerance, self-care activity  -AF      Sets/Reps Detail, Seated Upper Extremity (Therapeutic Exercise) 15 reps x 2 sets  -AF      Transfers Skills, Training to Functional Activity, Seated Upper Extremity (Therapeutic Exercise) transfers skills to functional activity most of the time  -AF      Recorded by [AF] ANNIE Espinoza      Row Name 04/20/18 8733 04/20/18 8740          Positioning and Restraints    Pre-Treatment Position in bed   -AF  --     Post Treatment Position wheelchair  -AF wheelchair  -LB     In Wheelchair sitting;with PT;with family/caregiver;exit alarm on;encouraged to call for assist;call light within reach   with PT 1st session, with  2nd session  -AF sitting;call light within reach;exit alarm on  -LB     Recorded by [AF] Keren Lowe, OTR [LB] Kera Cabrera PTA       User Key  (r) = Recorded By, (t) = Taken By, (c) = Cosigned By    Initials Name Effective Dates    LB Kera Cabrera PTA 03/07/18 -     AF Keren Lowe, OTR 04/03/18 -     KB Blaire L Bruton 03/07/18 -           Wound Right groin (Active)   Dressing Appearance dry;intact 4/20/2018  8:20 AM   Dressing Care, Wound foam;transparent film 4/19/2018  8:39 PM       NPWT (Negative Pressure Wound Therapy) R groin (Active)   Therapy Setting continuous therapy 4/20/2018  8:20 AM   Dressing foam, black 4/20/2018  8:20 AM   Pressure Setting 75 mmHg 4/20/2018  8:20 AM         OT Recommendation and Plan    Anticipated Discharge Disposition (OT):  (home with Home health)                  OT IRF GOALS     Row Name 04/19/18 1523 04/12/18 1524          Transfer Goal 1 (OT-IRF)    Activity/Assistive Device (Transfer Goal 1, OT-IRF)  -- toilet;shower chair;walk-in shower;tub  -SO     Mora Level (Transfer Goal 1, OT-IRF)  -- supervision required;verbal cues required  -SO     Time Frame (Transfer Goal 1, OT-IRF)  -- long term goal (LTG);2 weeks  -SO     Progress/Outcomes (Transfer Goal 1, OT-IRF) goal met  -AF  --        LB Dressing Goal 1 (OT-IRF)    Activity/Device (LB Dressing Goal 1, OT-IRF)  -- lower body dressing  -SO     Mora (LB Dressing Goal 1, OT-IRF)  -- supervision required  -SO     Time Frame (LB Dressing Goal 1, OT-IRF)  -- long term goal (LTG);2 weeks  -SO     Progress/Outcomes (LB Dressing Goal 1, OT-IRF) goal met  -AF  --        Toileting Goal 1 (OT-IRF)    Activity/Device (Toileting Goal 1, OT-IRF)  -- toileting skills, all;naheed  bar/safety frame  -SO     [place] Level (Toileting Goal 1, OT-IRF)  -- supervision required  -SO     Time Frame (Toileting Goal 1, OT-IRF)  -- long term goal (LTG);2 weeks  -SO     Progress/Outcomes (Toileting Goal 1, OT-IRF) goal met  -AF  --        Strength Goal 1 (OT-IRF)    Strength Goal 1 (OT-IRF)  -- Pt to increase overall BUE strength to 4-/5 to assist with functional activities and ADLs.  -SO     Time Frame (Strength Goal 1, OT-IRF)  -- long term goal (LTG);2 weeks  -SO     Progress/Outcomes (Strength Goal 1, OT-IRF) goal not met  -AF  --        Caregiver Training Goal 1 (OT-IRF)    Caregiver Training Goal 1 (OT-IRF)  -- Pt and caregiver to be independent with AE, HEP, home safety, etc. at d/c.  -SO     Time Frame (Caregiver Training Goal 1, OT-IRF)  -- long term goal (LTG);2 weeks  -SO     Progress/Outcomes (Caregiver Training Goal 1, OT-IRF) goal met  -AF  --       User Key  (r) = Recorded By, (t) = Taken By, (c) = Cosigned By    Initials Name Provider Type    SO Margo Jacob OTSANGITA Occupational Therapist    AF ANNIE Espinoza Occupational Therapist                 Time Calculation:           Time Calculation- OT     Row Name 04/20/18 1600 04/20/18 1559          Time Calculation- OT    OT Start Time 1100  -AF 0900  -AF     OT Stop Time 1130  -AF 0930  -AF     OT Time Calculation (min) 30 min  -AF 30 min  -AF       User Key  (r) = Recorded By, (t) = Taken By, (c) = Cosigned By    Initials Name Provider Type    AF ANNIE Espinoza Occupational Therapist             Therapy Charges for Today     Code Description Service Date Service Provider Modifiers Qty    40496652226 HC OT SELF CARE/MGMT/TRAIN EA 15 MIN 4/19/2018 ANNIE Espinoza GO 2    38954656270 HC OT THER PROC EA 15 MIN 4/19/2018 ANNIE Espinoza GO 1    15776625553 HC OT NEUROMUSC RE EDUCATION EA 15 MIN 4/19/2018 ANNIE Espinoza GO 1    54870477814 HC OT SELF CARE/MGMT/TRAIN EA 15 MIN 4/20/2018 Keren Quiñonez  ANNIE Lowe GO 2    04411612083 HC OT NEUROMUSC RE EDUCATION EA 15 MIN 4/20/2018 ANNIE Espinoza GO 1    04371763723 HC OT THER PROC EA 15 MIN 4/20/2018 ANNIE Espinoza GO 1                   ANNIE Espinoza  4/20/2018

## 2018-04-20 NOTE — PROGRESS NOTES
Case Management  Inpatient Rehabilitation Team Conference    Conference Date/Time: 4/20/2018 7:42:00 AM    Team Conference Attendees:  Sheila Rose, HERBERTHW  Kera Cabrera, PTA  Kera Marin, PT  Keren Lowe, OT  Katherine Bruton, SLP  Carla Harris, CTRS  Shira Cruz RD, LD  Miya Newman, RN   Chaplain Yoly    Demographics            Age: 77Y            Gender: Female    Admission Date: 4/11/2018 8:44:42 PM  Rehabilitation Diagnosis:  SAH  Past Medical History: MRSA right ear, cataract sx; HTN, hyperlipid; pneumonitis,  asthma, wheezing; RA, neck pain, osteoporosis, right TKR; CKD, hyst;  constipation, gastritis, GERD, malabsorption, c-scope, EGD; cerebral aneurysm,  cervical neuropathy; hyperglycemia; leukopenia, lymphocytosis, vit d defic;  anxiety, depression      Plan of Care  Anticipated Discharge Date/Estimated Length of Stay: ELOS: plan D/C 4/20  Anticipated Discharge Destination: Community discharge with assistance  Discharge Plan : Patient lives with  in tri-level home. 3 steps to enter  front. Bedroom and bathroom are upstairs. 6 steps up to that level.  Family conference held on 4/18 with patient, , son, and daughter-in-law.  Other son and daughter on speaker phone for conference.  D/C plan is home with . Daughter coming from [place] for a week to assist.  [facility] to provide home PT, OT, ST, NSG.(IV antibiotics and  care of wound vac)  Medical Necessity Expected Level Rationale: SBA  Intensity and Duration: an average of 3 hours/5 days per week  Medical Supervision and 24 Hour Rehab Nursing: x  Physical Therapy: x  PT Intensity/Duration: 60 minutes/day, 5 days/week  Occupational Therapy: x  OT Intensity/Duration: 60 minutes/day, 5 days/week  Speech and Language Therapy: x  SLP Intensity/Duration: 60 minutes/day, 5 days/week  Social Work: x  Therapeutic Recreation: x  Psychology: x  Updated (if changes indicated)    Anticipated  Discharge Date/Estimated Length of Stay:   ELOS: DC 4/23    Based on the patient's medical and functional status, their prognosis and  expected level of functional improvement is: SBA      Interdisciplinary Problem/Goals/Status    All Rehab Problems:  Body Function Structure    [RN] Skin Integrity(Active)  Current Status(04/16/2018): Right groin wound VAC; bruising all over  Weekly Goal(04/24/2018): No further skin breakdown  Discharge Goal: same as weekly        Cognition    [ST] Executive Functions(Active)  Current Status(04/19/2018): mild deficits in attentnion and thought  organization; moderate deficits in memory and reasoning skills  Weekly Goal(04/27/2018): follow written schedule I'ly  Discharge Goal: Functional cognition for home with intermittent supervision.        Mobility    [OT] Toilet Transfers(Active)  Current Status(04/19/2018): SBA  Weekly Goal(04/20/2018): SBA  Discharge Goal: SBA    [OT] Tub/Shower Transfers(Active)  Current Status(04/19/2018): SBA  Weekly Goal(04/20/2018): SBA  Discharge Goal: SBA    [PT] Bed/Chair/Wheelchair(Active)  Current Status(04/19/2018): SBA  Weekly Goal(04/20/2018): SBA  Discharge Goal: supervision    [PT] Walk(Active)  Current Status(04/19/2018): varies: CGA/SBA. Thurs morning maritza. Rwx, 200ft  Weekly Goal(04/20/2018): SBA to BR rwx  Discharge Goal: ' rwx    [PT] Bed Mobility(Active)  Current Status(04/19/2018): supervision  Weekly Goal(04/20/2018): indep  Discharge Goal: independent    [PT] Stairs(Active)  Current Status(04/19/2018): 12, cga with 1 rail  Weekly Goal(04/20/2018): PT only  Discharge Goal: supervision 12 w/HRs        Psychosocial    [RN] Coping/Adjustment(Active)  Current Status(04/16/2018): Patient has a supportive family  and son;  appears to be coping well with current status  Weekly Goal(04/25/2018): Patient will cope adequately regarding current status  and demonstrate healthy coping strategies  Discharge Goal: Same as  weekly        Safety    [RN] Potential for Injury(Active)  Current Status(04/16/2018): Subarachnoid hemorrhage; Left leg weaker than right  Weekly Goal(04/24/2018): Will use call light for assistance; no falls  Discharge Goal: No falls        Self Care    [OT] Bathing(Active)  Current Status(04/19/2018): SBA  Weekly Goal(04/20/2018): SBA  Discharge Goal: SBA    [OT] Dressing (Lower)(Active)  Current Status(04/19/2018): SBA  Weekly Goal(04/20/2018): SBA  Discharge Goal: SBA    [OT] Dressing (Upper)(Active)  Current Status(04/19/2018): SBA  Weekly Goal(04/20/2018): SBA  Discharge Goal: SBA    [OT] Grooming(Active)  Current Status(04/19/2018): SBA  Weekly Goal(04/20/2018): SBA  Discharge Goal: SBA    [OT] Toileting(Active)  Current Status(04/19/2018): SBA  Weekly Goal(04/20/2018): SBA  Discharge Goal: SBA        Sphincter Control    [RN] Bladder Management(Active)  Current Status(04/16/2018): Incontinent of bladder 100% per  report  Weekly Goal(04/25/2018): Continent 50%  Discharge Goal: Continent 100%    [RN] Bowel Management(Active)  Current Status(04/16/2018): Continent 100%  Weekly Goal(04/24/2018): Continent 100%  Discharge Goal: Continent 100%        Comments: 4/20: MD not discharging today due to blood pressure issues;  performance with therapies was poor yesterday morning (running into things on  the right), but much improved in the afternoon;    Signed by: Alfredo Salas RN    Physician CoSigned By: Misha Prince 04/20/2018 08:42:06

## 2018-04-20 NOTE — PROGRESS NOTES
Inpatient Rehabilitation Functional Measures Assessment    Functional Measures  BERONICA Eating:  [facility] Grooming: [facility] Bathing:  [facility] Upper Body Dressing:  [facility] Lower Body Dressing:  [facility] Toileting:  [facility] Bladder Management  Level of Assistance:  [place]  Frequency/Number of Accidents this Shift:  [facility] Bowel Management  Level of Assistance: [place]  Frequency/Number of Accidents this Shift: [facility] Bed/Chair/Wheelchair Transfer:  [facility] Toilet Transfer:  [facility] Tub/Shower Transfer:  [place]    Previously Documented Mode of Locomotion at Discharge: Field  BERONICA Expected Mode of Locomotion at Discharge: [facility] Walk/Wheelchair:  [facility] Stairs:  [facility] Comprehension:  Auditory comprehension is the usual mode. Comprehension  Score = 6, Modified [place].  Patient comprehends complex/abstract  information in their primary language, requiring: Additional time.  BERONICA Expression:  Vocal expression is the usual mode. Expression Score = 6,  Modified Independent.  Patient expresses complex/abstract information in their  primary language, requiring: Additional time.  BERONICA Social Interaction:  Social Interaction Score = 6, Modified Independent.  Patient is modified independent for social interaction, requiring: Requires  additional time.  BERONICA Problem Solving:  Activity was not observed.  BERONICA Memory:  Memory Score = 6, Modified [place].  Patient is modified  independent for memory, requiring: Requires additional time.    Therapy Mode Minutes  Occupational Therapy: Branch  Physical Therapy: Branch  Speech Language Pathology:  Branch    Signed by: Miya Newman RN

## 2018-04-20 NOTE — NURSING NOTE
04/20/18 1615   Wound Right groin   No Date first assessed or Time first assessed found.   Present On Admission : yes  Side: Right  Location: groin   Dressing Appearance moist drainage;intact   Closure Sutures   Base pink;red/granulating;slough   Periwound pink;yeast  (moved track bad off reddened area)   Periwound Temperature warm   Drainage Characteristics/Odor serosanguineous   Drainage Amount moderate   Care, Wound cleansed with;sterile normal saline   Dressing Care, Wound dressing changed;foam;transparent film   Periwound Care, Wound barrier film applied   NPWT (Negative Pressure Wound Therapy) R groin   No Placement Date or Time found.   Location: R groin   Therapy Setting continuous therapy   Dressing foam, black;foam, white   Pressure Setting 75 mmHg   Sponges Inserted 4  (2 white, 2 black)   Sponges Removed (all foam removed)   Dressing had been removed for Dr. Prince to observe.  Replaced with good seal.  Patient tolerated well.

## 2018-04-20 NOTE — PLAN OF CARE
Problem: Patient Care Overview  Goal: Plan of Care Review  Outcome: Ongoing (interventions implemented as appropriate)   04/20/18 1559   Patient Care Overview   IRF Plan of Care Review progress ongoing, continue   Progress, Functional Goals demonstrating adequate progress   Coping/Psychosocial   Plan of Care Reviewed With patient   OTHER   Outcome Summary Pt doing well this shift. No complaints of pain at this time. Wound vac to be changed by wound ostomy nurse today. No safety concerns at this time.       Problem: Skin Injury Risk (Adult)  Goal: Skin Health and Integrity  Outcome: Ongoing (interventions implemented as appropriate)      Problem: Fall Risk (Adult)  Goal: Absence of Fall  Outcome: Ongoing (interventions implemented as appropriate)      Problem: Stroke (IRF) (Adult)  Goal: Promote Optimal Functional [place]  Outcome: Ongoing (interventions implemented as appropriate)

## 2018-04-20 NOTE — PROGRESS NOTES
Discussed progress, d/c postponed until Monday, 4/23, and d/c plans with patient, , daughter, son, and daughter-in-law. Patient disappointed not to go home today. Discussed fluctuations at time with cognition and mobility. Also discussed BNE results. Discussed wound VAC to be provided by [employer] and will be delivered to patient's room on Monday before d/c, per [employer] rep. [facility] also notified regarding d/c for Monday instead of today. They will supply home PT, OT, ST, and NSG (wound care and IV antibiotics). IV antibiotics to be supplied by [facility] pharmacy. Will assist with d/c plans for Monday, 4/23.

## 2018-04-20 NOTE — PROGRESS NOTES
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   Right Groin hematoma s/p evacuation  -Exploration of right groin hematoma, direct repair of right common femoral artery, right SFA endarterectomy and patch angioplasty  March 27  Right groin wound debridement and wound VAC placement April 4  right groin wound infection  - Rocephin and Metronidazole x 4 weeks - stop date May 9  S/p repair femoral artery w/ vein patch and sartorius patch on April 7  HTN        Subjective     History of Present Illness    Subjective   She denies any headache.  No discomfort at the right groin except when she has the dressing change.  Continues with improved strength proximally in the right lower extremity.  Tolerating therapies.    History taken from: patient    Objective     Vital Signs  Temp:  [97.6 °F (36.4 °C)-98.5 °F (36.9 °C)] 98.5 °F (36.9 °C)  Heart Rate:  [72-85] 85  Resp:  [18] 18  BP: (116-135)/(59-77) 120/59    Objective  MENTAL STATUS-awake, alert  HEENT - NCAT   LUNGS -CTA  HEART -RRR  ABD - NABS, soft, NT  EXT -   RIGHT GROIN.  Wound with red/pink base with some oozing of blood, no sign of active infection.  NEURO - takes resistance bilateral upper extremities and lower extremities      Results Review:     I reviewed the patient's new clinical results.    Results from last 7 days  Lab Units 04/16/18  0507   WBC 10*3/mm3 5.50   HEMOGLOBIN g/dL 9.3*   HEMATOCRIT % 29.3*   PLATELETS 10*3/mm3 179         Results from last 7 days  Lab Units 04/20/18  0438 04/16/18  0507   SODIUM mmol/L 137 137   POTASSIUM mmol/L 3.4* 3.7   CHLORIDE mmol/L 105 102   CO2 mmol/L 22.6 22.3   BUN mg/dL 14 20   CREATININE mg/dL 0.89 0.89   CALCIUM mg/dL 8.1* 8.6   BILIRUBIN  mg/dL  --  0.8   ALK PHOS U/L  --  51   ALT (SGPT) U/L  --  19   AST (SGOT) U/L  --  33*   GLUCOSE mg/dL 87 89       Results from last 7 days  Lab Units 04/16/18  0507   CRP mg/dL 5.77*       Results from last 7 days  Lab Units 04/16/18  0507   SED RATE mm/hr 36*         Medication Review: DONE  Scheduled Meds:    aspirin 325 mg Oral Daily   ceftriaxone 2 g Intravenous Q24H   cetirizine 5 mg Oral Daily   cholecalciferol 1,000 Units Oral Daily   clopidogrel 75 mg Oral Daily   escitalopram 20 mg Oral Daily   folic acid 1 mg Oral Daily   hydrALAZINE 10 mg Oral Q8H   lacosamide 100 mg Oral Q12H   levETIRAcetam 1,000 mg Oral BID   metroNIDAZOLE 500 mg Oral Q8H   montelukast 10 mg Oral Daily   multivitamin 1 tablet Oral Daily   pantoprazole 40 mg Oral Q AM   vitamin B-6 50 mg Oral Daily   vitamin C 500 mg Oral Daily     Continuous Infusions:   PRN Meds:.•  albuterol  •  EPINEPHrine  •  hydrALAZINE  •  oxyCODONE-acetaminophen **OR** oxyCODONE-acetaminophen  •  triamcinolone      Assessment/Plan     Active Problems:    Subarachnoid hemorrhage      Assessment & Plan   SAH - March 22, left paraopthalmic artery aneurysm s/p endovascular embolization.   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     S/P vasospasm- completed nimotop    Aspirin / Plavix    Decadron tapered    GI prophylaxis - Protonix    Seizure - Vimpat / Keppra    HTN - SBP goal 120-180, s/p vasospams - Patient's SBP goal is 120-180 per Neurosurgery.  April 12 - SBP was averaging 140's and ranged 120's to 180's recently at [facility].  She was on Hydralazine 25 mg q  8 hours scheduled and Hydralazine 10 mg IV q 4 hours prn SBP >180 or DBP > 100 with last dose 4-11-18 at 07:35 there.  At discharge she was restarted on Lasix 20 mg daily, Metoprolol 100 mg daily and Doxazosin 2 mg HS , which she was not on  there.  SBP was 168 last PM and 118 this AM.  Will continue Hydralazine scheduled and po prn and Lasix, but hold additional Metoprolol and Doxazosin for now to avoid excessive BP reduction s/p aneurysm stent and s/p vasospasm. She completed Nimotop April 11.   April 13 -  - resume Metoprolol succinate at lower dose of 25 mg daily  April 16 - BP elevated last PM SBP 180s - given hydralazine prn, increased Metoprolol to 50 mg daily; may titrate up on hydralazine, follow pattern.  April 17 - She refused hydralazine night before last. Metoprolol increased yesterday. - yesterday. Hydralazine held last PM and this afternoon again as . Continue to follow pattern.   April 18- continue scheduled BP medication reduced metoprolol to 25 mg XL daily.   Variable BP pattern.  Son notes cognition is worse when BP lower. Son who is physician reports Neurosurgery felt she could be susceptiable to vasospasm for some time.    She is on much less BP meds compared to what is reported at home.  Hydralazine 25 mg has been given only once each day last three days.  Metoprolol held today and decreased to 25 mg daily starting tomorrow.   Have also decreased Hydralazine from 25 mg to 10 mg q 8 hours, hold for SBP< 120.   On Lasix 20 mg daily.  Will need established routine BP med regimen prior to discharge.  April 19 - blood pressure pattern with less variation today  April 20 -  last pm and hydralazine held. BMP okay today.  Will discontinue Metoprolol and Lasix for now and follow pattern.     Right Groin hematoma, right groin wound infection s/p wound vac placement.  Right Groin hematoma s/p evacuation  -Exploration of right groin hematoma, direct repair of right common femoral artery, right SFA endarterectomy and patch angioplasty  March 27  Right groin wound debridement and wound VAC placement April 4  right groin wound infection  - Rocephin and Metronidazole x 4 weeks - stop date May 9  S/p repair femoral  artery w/ vein patch and sartorius patch on April 7 April 20 - wound inspected with clean base but some oozing of blood, needs to continue ASA and Plavix with pipeline device for cerebral aneurysm     ID - Rocephin / Metronidazole x 4 weeks - Clarified with [facility] pharmacy - Ceftriazone started 4-7 and Metronidazole started 4-11 - will do stop date May 9.  April 16 - weekly labs reviewed. Results to ID.     DVT prophylaxis - SCDs         TEAM CONF - April 13 - BALANCE AND PROPRIOCEPTIVE DEFICITS, SOMETIMES RUN INTO OBJECTS TO RIGHT, CORRECT WITH CUING. TRANSFERS CTG. GAIT 160 FEET RW CTG/MIN ASSIST. WOUND VAC RIGHT GROIN. BATH MIN. LBD MOD, UBD MIN ASSIST. COGNITION - SLOW PROCESSING AND MILD DIFFICULTY WITH EXECUTIVE FUNCTION. CONTINENT BOWEL AND BLADDER.  ELOS- ONE WEEK      April 18 - In PT, transfers SBA. Gait 300 feet RW SBA/CTG.    With Speech therapy, does better with visual memory compared to verbal memory.    With OT, UBD SBA.    BNE April 19 -   BNE (Active)  Att'n. - Mildly Imp.  Exec. Fx. - Mod. Imp.  Rsng/Jgmnt - Mildly Imp.  Arith - WNL  Visuospatial Skills - Mildly Imp.  Visual Mem. - Mildly Imp.  Verbal Mem. - Severely Imp.  Emot - Pt denied emotional distress    April 19 - in physical therapy, transfers contact-guard- stand by assist.  Gait 340 feet at times minimal assist rolling walker.  Still decreased attention to the right side.  12 stairs with contact-guard assist.  In speech therapy,Pt continues with overall moderate cognitive deficits, notably in the areas of memory, reasoning, and thought organization. Pt also with mild expressive language difficulties, verbally and in writing. She presents with paraphasias at times and has difficulty with spelling out single words when writing.  In occupational therapy, tub and toilet transfers and lower body dressings assist.    TEAM CONF - April 20 - YESTERDAY AFTERNOON BACK TO CTG ASSIST WITH GAIT WITH RW AND STAIRS WITH BETTER ATTENTION TO  RIGHT. YESTERDAY WITH OT ADLS SBA. FURTHER FAMILY TEACHING TODAY. WITH SPEECH THERAPY, VARY WITH FATIGUE. VERBAL MEMORY POOR, VISUAL MEMORY MILD IMPAIRMENT.VARIABLE PERFORMANCE WITH THUGHT ORGANIZATION. PARAPHRASIA WHEN TIRED. BNE AS NOTED ABOVE. DOES BETTER WHEN MORE ACTIVE. HTN- HELD HYDRALAZINE LAST PM. BMP OKAY. WILL DISCONTINUE METOPROLOL  AND LASIX FOR NOW AND FOLLOW.   ELOS- MONDAY    Misha Prince MD  04/20/18  7:44 AM    Time:

## 2018-04-20 NOTE — PLAN OF CARE
Problem: Skin Injury Risk (Adult)  Goal: Skin Health and Integrity  Outcome: Ongoing (interventions implemented as appropriate)   04/20/18 0051   Skin Injury Risk (Adult)   Skin Health and Integrity making progress toward outcome       Problem: Fall Risk (Adult)  Goal: Absence of Fall  Outcome: Ongoing (interventions implemented as appropriate)   04/20/18 0051   Fall Risk (Adult)   Absence of Fall making progress toward outcome       Problem: Mobility, Physical Impaired (Adult)  Goal: Enhanced Mobility Skills  Outcome: Ongoing (interventions implemented as appropriate)   04/20/18 0051   Mobility, Physical Impaired (Adult)   Enhanced Mobility Skills making progress toward outcome

## 2018-04-20 NOTE — PROGRESS NOTES
Occupational Therapy: Individual: 60 minutes.    Physical Therapy: Branch    Speech Language Pathology:  Branch    Signed by: ANNIE Silva/YUSEF

## 2018-04-20 NOTE — PROGRESS NOTES
Inpatient Rehabilitation Functional Measures Assessment    Functional Measures  BERONICA Eating:  [facility] Grooming: [facility] Bathing:  [facility] Upper Body Dressing:  [facility] Lower Body Dressing:  [facility] Toileting:  [facility] Bladder Management  Level of Assistance:  [place]  Frequency/Number of Accidents this Shift:  [facility] Bowel Management  Level of Assistance: [place]  Frequency/Number of Accidents this Shift: [facility] Bed/Chair/Wheelchair Transfer:  [facility] Toilet Transfer:  [facility] Tub/Shower Transfer:  [place]    Previously Documented Mode of Locomotion at Discharge: Field  BERONICA Expected Mode of Locomotion at Discharge: [facility] Walk/Wheelchair:  [facility] Stairs:  [facility] Comprehension:  Auditory comprehension is the usual mode. Comprehension  Score = 6, Modified [place].  Patient comprehends complex/abstract  information in their primary language, requiring:  [facility] Expression:  Vocal expression is the usual mode. Expression Score = 6,  Modified Independent.  Patient expresses complex/abstract information in their  primary language, requiring:  [facility] Social Interaction:  Social Interaction Score = 6, Modified Independent.  Patient is modified independent for social interaction, requiring:  [facility] Problem Solving:  Activity was not observed.  BERONICA Memory:  Memory Score = 6, Modified [place].  Patient is modified  independent for memory, requiring:    Therapy Mode Minutes  Occupational Therapy: Branch  Physical Therapy: Branch  Speech Language Pathology:  Branch    Signed by: Keyla Harding RN

## 2018-04-20 NOTE — THERAPY TREATMENT NOTE
"Inpatient Rehabilitation - Physical Therapy Treatment Note  [facility]     Patient Name: Herlinda Ta  : 1940  MRN: [id number]    Today's Date: 2018                 Admit Date: 2018      Visit Dx:      ICD-10-CM ICD-9-CM   1. 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   • Cerebral vasospasm   • Cervical pain (neck)   • Depressed   • Depression   • Dyspepsia   • Acid reflux   • Groin hematoma   • HTN, goal below 140/90   • HLD (hyperlipidemia)   • Infection of artery   • Iron deficiency anemia   • MRSA (methicillin resistant staph aureus) culture positive   • Rheumatoid arthritis   • Urinary incontinence, mixed   • Subarachnoid hemorrhage       Therapy Treatment    Evaluation/Coping    Evaluation/Treatment Time and Intent  Document Type: therapy note (daily note) (18 : Kera Cabrera PTA)  Mode of Treatment: physical therapy (18 : Kera Cabrera PTA)  Patient/Family Observations: Agreed to PT. \"broken-hearted\" that she will not be discharged today. (18 : Kera Cabrera PTA)    Vitals/Pain/Safety    Pain Scale: Numbers Pre/Post-Treatment  Pain Scale: Numbers, Pretreatment: 0/10 - no pain (18 : Kera Cabrera PTA)  Positioning and Restraints  Post Treatment Position: wheelchair (18 : Kera Cabrera PTA)  In Wheelchair: sitting, call light within reach, exit alarm on (18 : Kera Cabrera PTA)    Cognition/Communication         Oral Motor/Eating         Mobility/Basic Activities/Instrumental Activities/Motor/Modality    Bed Mobility Assessment/Treatment  Supine-Sit [place] (Bed Mobility): supervision (18 : Kera Cabrera PTA)  Assistive " Device (Bed Mobility): bed rails, head of bed elevated (04/20/18 0930 : Kera Cabrera PTA)  Bed-Chair Transfer  Bed-Chair [place] (Transfers): stand by assist (04/20/18 0930 : Kera Cabrera PTA)  Sit-Stand Transfer  Sit-Stand [place] (Transfers): stand by assist (04/20/18 0930 : Kera Cabrera PTA)  Assistive Device (Sit-Stand Transfers): walker, front-wheeled (04/20/18 0930 : Kera Cabrera PTA)  Stand-Sit Transfer  Stand-Sit [place] (Transfers): stand by assist (04/20/18 0930 : Kera Cabrera PTA)  Assistive Device (Stand-Sit Transfers): walker, front-wheeled (04/20/18 0930 : Kera Cabrera PTA)  Gait/Stairs Assessment/Training  [place] Level (Gait): contact guard, stand by assist (04/20/18 0930 : Kera Cabrera PTA)  Assistive Device (Gait): walker, front-wheeled (04/20/18 0930 : Kera Cabrera PTA)  Distance in Feet (Gait): 260 (04/20/18 0930 : Kera Cabrera PTA)  Pattern (Gait): step-through (04/20/18 0930 : Kera Cabrera PTA)  Deviations/Abnormal Patterns (Gait):  (cues to scan right) (04/20/18 0930 : Kera Cabrear PTA)  Right Sided Gait Deviations: heel strike decreased (04/20/18 0930 : Kera Cabrera PTA)  [place] Level (Stairs): contact guard (04/20/18 0930 : Kera Cabrera PTA)  Handrail Location (Stairs): right side (ascending) (04/20/18 0930 : Kera Cabrera PTA)  Number of Steps (Stairs): 12 (04/20/18 0930 : Kera Cabrera PTA)  Ascending Technique (Stairs): step-over-step (04/20/18 0930 : Kera Cabrera PTA)  Descending Technique (Stairs): step-to-step (04/20/18 0930 : Kera Cabrera PTA)  Comment (Gait/Stairs): curb, rwx, cga, vc's (04/20/18 0930 : Kera Cabrera PTA)              ROM/MMT                   Sensory/Myotome/Dermatome/Edema               Posture/Balance/Special Tests/Exercise/Transportation/Sexual Function    Sitting Balance Activity  Activities Performed (Sitting, Balance Training):  sitting on ball, multi-directional weight shift (04/20/18 0930 : Kera Cabrera PTA)  Support Needed (Sitting, Balance Training): minimal external support for balance, 75% patient effort, balances without upper extremity support (vc's. Used mirror alao) (04/20/18 0930 : Kera Cabrera PTA)  Dynamic Balance Activity  Therapeutic Training Performed (Dynamic Balance): obstacle course (amb 24 ft n uneven surface, rwx, cga, vc's) (04/20/18 0930 : Kera Cabrera PTA)              Orthotics/Residual Limb/Prosthetic Management              Outcome Summary                 PT Recommendation and Plan                         PT IRF GOALS     Row Name 04/19/18 1600 04/12/18 0900          Bed Mobility Goal 1 (PT-IRF)    Activity/Assistive Device (Bed Mobility Goal 1, PT-IRF) bed mobility activities, all  -LB bed mobility activities, all  -EE     [place] Level (Bed Mobility Goal 1, PT-IRF) independent  -LB independent  -EE     Time Frame (Bed Mobility Goal 1, PT-IRF) 1 day  -LB long term goal (LTG);2 weeks  -EE     Progress/Outcomes (Bed Mobility Goal 1, PT-IRF) goal ongoing  -LB goal ongoing  -EE        Transfer Goal 1 (PT-IRF)    Activity/Assistive Device (Transfer Goal 1, PT-IRF) sit-to-stand/stand-to-sit;bed-to-chair/chair-to-bed   w AAD  -LB sit-to-stand/stand-to-sit;bed-to-chair/chair-to-bed   with A.A.D.  -EE     [place] Level (Transfer Goal 1, PT-IRF) conditional independence  -LB conditional independence  -EE     Time Frame (Transfer Goal 1, PT-IRF) 1 day  -LB long term goal (LTG);2 weeks  -EE     Progress/Outcomes (Transfer Goal 1, PT-IRF) goal ongoing  -LB goal ongoing  -EE        Transfer Goal 2 (PT-IRF)    Activity/Assistive Device (Transfer Goal 2, PT-IRF) car transfer  -LB car transfer  -EE     [place] Level (Transfer Goal 2, PT-IRF) supervision required  -LB supervision required  -EE     Time Frame (Transfer Goal 2, PT-IRF) 1 day  -LB long term goal (LTG);2 weeks  -EE      Progress/Outcomes (Transfer Goal 2, PT-IRF) goal ongoing  -LB goal ongoing  -EE        Gait/Walking Locomotion Goal 1 (PT-IRF)    Activity/Assistive Device (Gait/Walking Locomotion Goal 1, PT-IRF) gait (walking locomotion)  -LB gait (walking locomotion)   with A.A.D.  -EE     Gait/Walking Locomotion Distance Goal 1 (PT-IRF) 160  -  -EE     [place] Level (Gait/Walking Locomotion Goal 1, PT-IRF) supervision required  -LB supervision required  -EE     Time Frame (Gait/Walking Locomotion Goal 1, PT-IRF) 1 day  -LB long term goal (LTG);2 weeks  -EE     Progress/Outcomes (Gait/Walking Locomotion Goal 1, PT-IRF) goal ongoing  -LB goal ongoing  -EE        Stairs Goal 1 (PT-IRF)    Activity/Assistive Device (Stairs Goal 1, PT-IRF) ascending stairs;descending stairs;using handrail, left;using handrail, right  -LB ascending stairs;descending stairs;using handrail, left;using handrail, right  -EE     Number of Stairs (Stairs Goal 1, PT-IRF) 12  -LB 12  -EE     [place] Level (Stairs Goal 1, PT-IRF) supervision required  -LB supervision required  -EE     Time Frame (Stairs Goal 1, PT-IRF) 1 day  -LB long term goal (LTG);2 weeks  -EE     Progress/Outcomes (Stairs Goal 1, PT-IRF) goal ongoing  -LB goal ongoing  -EE       User Key  (r) = Recorded By, (t) = Taken By, (c) = Cosigned By    Initials Name Provider Type    KENTON Cabrera PTA Physical Therapy Assistant    OFELIA Bernstein, PT Physical Therapist                   Time Calculation:           PT Charges     Row Name 04/20/18 1443 04/20/18 1050          Time Calculation    Start Time 1400  -LB 0930  -LB     Stop Time 1430  -LB 1000  -LB     Time Calculation (min) 30 min  -LB 30 min  -LB       User Key  (r) = Recorded By, (t) = Taken By, (c) = Cosigned By    Initials Name Provider Type    KENTON Cabrera PTA Physical Therapy Assistant            Therapy Charges for Today     Code Description Service Date Service Provider Modifiers Qty    [id number]  HC PT THER PROC EA 15 MIN 4/19/2018 Kera Cabrera, PTA GP 4    64389236025 HC PT CARE PLAN EACH 15 MIN 4/20/2018 Kera Cabrera, PTA GP 2    [id number] HC PT THER PROC EA 15 MIN 4/20/2018 Kera Cabrera, PTA GP 4                   Kera Cabrera, PTA  4/20/2018

## 2018-04-20 NOTE — PROGRESS NOTES
Inpatient Rehabilitation Functional Measures Assessment and Plan of Care    Plan of Care  Updated Problems/Interventions  Field    Functional Measures  BERONICA Eating:  [place]  BERONICA Grooming: [facility] Bathing:  [facility] Upper Body Dressing:  [facility] Lower Body Dressing:  [facility] Toileting:  [facility] Bladder Management  Level of Assistance:  [place]  Frequency/Number of Accidents this Shift:  [facility] Bowel Management  Level of Assistance: [place]  Frequency/Number of Accidents this Shift: Branch    [facility] Bed/Chair/Wheelchair Transfer:  Bed/chair/wheelchair Transfer Score = 5.  Patient is supervision/set-up for transferring to and from the  bed/chair/wheelchair, requiring: Patient requires the following assistive  device(s): Bed rails. Elevated head of bed.  BERONICA Toilet Transfer:  [facility] Tub/Shower Transfer:  [place]    Previously Documented Mode of Locomotion at Discharge: Field  [facility] Expected Mode of Locomotion at Discharge: [facility] Walk/Wheelchair:  WHEELCHAIR OBSERVATION   Activity was not observed.    WALK OBSERVATION   Walk Distance Scale = 3.  Distance walked is greater than 150 feet. Walk Score  = 4.  Patient performs 75% or more of effort and requires minimal assistance.  Incidental help/contact guard/steadying was provided. Patient walked a distance  of  260 feet. Patient requires the following assistive device(s): Rolling  walker.  BERONICA Stairs:  Stairs Score = 4.  Incidental help/contact guard/steadying was  provided. Patient performs 75% or more of effort and requires minimal  assistance. Patient negotiated 12 stairs. Patient requires the following  assistive device(s): Handrail(s).    BERONICA Comprehension:  [place]  BERONICA Expression:  [facility] Social Interaction:  [facility] Problem Solving:  [facility] Memory:  [place]    Therapy Mode Minutes  Occupational Therapy: [place]  Physical Therapy: Individual: 60 minutes.  Speech Language Pathology:  [place]    Signed by: Kera Cabrera  PTA

## 2018-04-20 NOTE — PROGRESS NOTES
Inpatient Rehabilitation Plan of Care Note    Plan of Care  Care Plan Reviewed - No updates at this time.    Safety    Performed Intervention(s)  Safety rounds; call light/items within easy reach  Bed Alarm      Body Function Structure    Performed Intervention(s)  Skin assessment every shift  Dressing changes/Wound Care as order  Turning      Sphincter Control    Performed Intervention(s)  Monitor I&O  Timed voids  Use incontinent products      Psychosocial    Performed Intervention(s)  Encourage to verbalize concerns  Offer diversional activities    Signed by: Keyla Harding RN

## 2018-04-21 PROCEDURE — 25010000003 CEFTRIAXONE PER 250 MG: Performed by: PHYSICAL MEDICINE & REHABILITATION

## 2018-04-21 PROCEDURE — 97110 THERAPEUTIC EXERCISES: CPT

## 2018-04-21 RX ADMIN — LACOSAMIDE 100 MG: 100 TABLET, FILM COATED ORAL at 08:47

## 2018-04-21 RX ADMIN — METRONIDAZOLE 500 MG: 500 TABLET, FILM COATED ORAL at 14:00

## 2018-04-21 RX ADMIN — HYDRALAZINE HYDROCHLORIDE 10 MG: 10 TABLET, FILM COATED ORAL at 06:45

## 2018-04-21 RX ADMIN — CETIRIZINE HYDROCHLORIDE 5 MG: 10 TABLET, FILM COATED ORAL at 08:47

## 2018-04-21 RX ADMIN — Medication 1 TABLET: at 08:47

## 2018-04-21 RX ADMIN — MONTELUKAST 10 MG: 10 TABLET, FILM COATED ORAL at 08:47

## 2018-04-21 RX ADMIN — LEVETIRACETAM 1000 MG: 500 TABLET, FILM COATED ORAL at 08:47

## 2018-04-21 RX ADMIN — PANTOPRAZOLE SODIUM 40 MG: 40 TABLET, DELAYED RELEASE ORAL at 06:45

## 2018-04-21 RX ADMIN — CEFTRIAXONE SODIUM 2 G: 2 INJECTION, SOLUTION INTRAVENOUS at 14:00

## 2018-04-21 RX ADMIN — LACOSAMIDE 100 MG: 100 TABLET, FILM COATED ORAL at 21:04

## 2018-04-21 RX ADMIN — VITAMIN D, TAB 1000IU (100/BT) 1000 UNITS: 25 TAB at 08:48

## 2018-04-21 RX ADMIN — METRONIDAZOLE 500 MG: 500 TABLET, FILM COATED ORAL at 21:04

## 2018-04-21 RX ADMIN — CLOPIDOGREL 75 MG: 75 TABLET, FILM COATED ORAL at 08:47

## 2018-04-21 RX ADMIN — HYDRALAZINE HYDROCHLORIDE 10 MG: 10 TABLET, FILM COATED ORAL at 21:04

## 2018-04-21 RX ADMIN — ASPIRIN 325 MG: 325 TABLET ORAL at 08:47

## 2018-04-21 RX ADMIN — HYDRALAZINE HYDROCHLORIDE 10 MG: 10 TABLET, FILM COATED ORAL at 14:00

## 2018-04-21 RX ADMIN — FOLIC ACID 1 MG: 1 TABLET ORAL at 08:47

## 2018-04-21 RX ADMIN — PYRIDOXINE HCL TAB 50 MG 50 MG: 50 TAB at 08:47

## 2018-04-21 RX ADMIN — OXYCODONE HYDROCHLORIDE AND ACETAMINOPHEN 1 TABLET: 5; 325 TABLET ORAL at 21:05

## 2018-04-21 RX ADMIN — LEVETIRACETAM 1000 MG: 500 TABLET, FILM COATED ORAL at 21:04

## 2018-04-21 RX ADMIN — ESCITALOPRAM 20 MG: 20 TABLET, FILM COATED ORAL at 08:47

## 2018-04-21 RX ADMIN — OXYCODONE HYDROCHLORIDE AND ACETAMINOPHEN 500 MG: 500 TABLET ORAL at 08:47

## 2018-04-21 RX ADMIN — METRONIDAZOLE 500 MG: 500 TABLET, FILM COATED ORAL at 06:45

## 2018-04-21 NOTE — PROGRESS NOTES
Inpatient Rehabilitation Functional Measures Assessment    Functional Measures  BERONICA Eating:  [facility] Grooming: [facility] Bathing:  [facility] Upper Body Dressing:  [facility] Lower Body Dressing:  [facility] Toileting:  [facility] Bladder Management  Level of Assistance:  [place]  Frequency/Number of Accidents this Shift:  [facility] Bowel Management  Level of Assistance: [place]  Frequency/Number of Accidents this Shift: [facility] Bed/Chair/Wheelchair Transfer:  [facility] Toilet Transfer:  [facility] Tub/Shower Transfer:  [place]    Previously Documented Mode of Locomotion at Discharge: Field  BERONICA Expected Mode of Locomotion at Discharge: [facility] Walk/Wheelchair:  [facility] Stairs:  [facility] Comprehension:  Auditory comprehension is the usual mode. Comprehension  Score = 6, Modified [place].  Patient comprehends complex/abstract  information in their primary language with only mild difficulty.  BERONICA Expression:  Vocal expression is the usual mode. Expression Score = 6,  Modified Independent.  Patient expresses complex/abstract information in their  primary language with only mild difficulty with tasks.  BERONICA Social Interaction:  Social Interaction Score = 6, Modified Independent.  Patient is modified independent for social interaction, requiring: Medications.    BERONICA Problem Solving:  Activity was not observed.  BERONICA Memory:  Memory Score = 6, Modified [place].  Patient is modified  independent for memory, having only mild difficulty and using self-initiated or  environmental cues to remember.    Therapy Mode Minutes  Occupational Therapy: Branch  Physical Therapy: [place]  Speech Language Pathology:  [place]    Signed by: Ivone Gamez RN

## 2018-04-21 NOTE — PROGRESS NOTES
Inpatient Rehabilitation Functional Measures Assessment    Functional Measures  BERONICA Eating:  [facility] Grooming: [facility] Bathing:  [facility] Upper Body Dressing:  [facility] Lower Body Dressing:  [facility] Toileting:  [facility] Bladder Management  Level of Assistance:  [place]  Frequency/Number of Accidents this Shift:  [facility] Bowel Management  Level of Assistance: [place]  Frequency/Number of Accidents this Shift: [facility] Bed/Chair/Wheelchair Transfer:  [facility] Toilet Transfer:  [facility] Tub/Shower Transfer:  [place]    Previously Documented Mode of Locomotion at Discharge: Field  BERONICA Expected Mode of Locomotion at Discharge: [facility] Walk/Wheelchair:  [facility] Stairs:  [facility] Comprehension:  Auditory comprehension is the usual mode. Comprehension  Score = 6, Modified [place].  Patient comprehends complex/abstract  information in their primary language with only mild difficulty.  BEORNICA Expression:  Vocal expression is the usual mode. Expression Score = 6,  Modified Independent.  Patient expresses complex/abstract information in their  primary language with only mild difficulty with tasks.  BERONICA Social Interaction:  Social Interaction Score = 7, Independent. Patient is  completely independent for social interaction.  There are no activity  limitations.  BERONICA Problem Solving:  Problem Solving Score = 6, Modified [place].  Patient  makes appropriate decisions in order to solve complex problems with mild  difficulty but self-corrects.  BERONICA Memory:  Memory Score = 6, Modified [place].  Patient is modified  independent for memory, having only mild difficulty and using self-initiated or  environmental cues to remember.    Therapy Mode Minutes  Occupational Therapy: [place]  Physical Therapy: [place]  Speech Language Pathology:  [place]    Signed by: Marixa Palomo RN

## 2018-04-21 NOTE — PROGRESS NOTES
Inpatient Rehabilitation Plan of Care Note    Plan of Care  Care Plan Reviewed - Updates as Follows    Body Function Structure    [RN] Skin Integrity(Active)  Current Status(04/21/2018): Right groin wound VAC; bruising all over  Weekly Goal(04/28/2018): No further skin breakdown  Discharge Goal: same as weekly. Wound vac, follow up scheduled for after  discharge    Performed Intervention(s)  Skin assessment every shift  Dressing changes/Wound Care as order  Turning      Psychosocial    [RN] Coping/Adjustment(Active)  Current Status(04/21/2018): Patient has a supportive family  and son;  appears to be coping well with current status  Weekly Goal(04/28/2018): Patient will cope adequately regarding current status  and demonstrate healthy coping strategies  Discharge Goal: Same as weekly    Performed Intervention(s)  Encourage to verbalize concerns  Offer diversional activities      Safety    [RN] Potential for Injury(Active)  Current Status(04/21/2018): Subarachnoid hemorrhage; Left leg weaker than right  Weekly Goal(04/28/2018): Will use call light for assistance; no falls  Discharge Goal: Pt/ family aware of fall/safety in the home setting    Performed Intervention(s)  Safety rounds; call light/items within easy reach  Bed Alarm , chair alarm      Sphincter Control    [RN] Bladder Management(Active)  Current Status(04/21/2018): Ambulating to BR with assist , continent. Wears pull  up for accidents.  Weekly Goal(04/28/2018): Continent 100%  Discharge Goal: Continent 100%    [RN] Bowel Management(Active)  Current Status(04/21/2018): Continent 100%  Weekly Goal(04/28/2018): Continent 100%  Discharge Goal: Continent 100%    Performed Intervention(s)  Monitor I&O  Timed voids  Use incontinent products    Signed by: Ivone Gamez RN

## 2018-04-21 NOTE — PLAN OF CARE
Problem: Patient Care Overview  Goal: Plan of Care Review  Outcome: Ongoing (interventions implemented as appropriate)   04/21/18 6606   Patient Care Overview   IRF Plan of Care Review progress ongoing, continue   Progress, Functional Goals demonstrating adequate progress   Coping/Psychosocial   Plan of Care Reviewed With patient   OTHER   Outcome Summary Pleasant and cooperative. Ambulates assist of 1. Wound vac intact rt groin. No c/o pain. No unsafe behavior.       Problem: Skin Injury Risk (Adult)  Goal: Skin Health and Integrity  Outcome: Ongoing (interventions implemented as appropriate)      Problem: Fall Risk (Adult)  Goal: Absence of Fall  Outcome: Ongoing (interventions implemented as appropriate)      Problem: Mobility, Physical Impaired (Adult)  Goal: Enhanced Mobility Skills  Outcome: Ongoing (interventions implemented as appropriate)

## 2018-04-21 NOTE — PROGRESS NOTES
Inpatient Rehabilitation Plan of Care Note    Plan of Care  Care Plan Reviewed - No updates at this time.    Signed by: Marixa Palomo RN

## 2018-04-21 NOTE — THERAPY TREATMENT NOTE
Inpatient Rehabilitation - Physical Therapy Treatment Note  [facility]     Patient Name: Herlinda Ta  : 1940  MRN: [id number]  Today's Date: 2018             Admit Date: 2018    Visit Dx:    ICD-10-CM ICD-9-CM   1. Subarachnoid hemorrhage I60.9 430     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   • Cerebral vasospasm   • Cervical pain (neck)   • Depressed   • Depression   • Dyspepsia   • Acid reflux   • Groin hematoma   • HTN, goal below 140/90   • HLD (hyperlipidemia)   • Infection of artery   • Iron deficiency anemia   • MRSA (methicillin resistant staph aureus) culture positive   • Rheumatoid arthritis   • Urinary incontinence, mixed   • Subarachnoid hemorrhage       Therapy Treatment        Wound Right groin (Active)   Dressing Appearance intact 2018  8:00 AM   Closure Sutures 2018  4:15 PM   Base pink;red/granulating;slough 2018  4:15 PM   Periwound pink;yeast 2018  4:15 PM   Periwound Temperature warm 2018  4:15 PM   Drainage Characteristics/Odor serosanguineous 2018  4:15 PM   Drainage Amount moderate 2018  4:15 PM   Care, Wound cleansed with;sterile normal saline 2018  4:15 PM   Dressing Care, Wound dressing changed;foam;transparent film 2018  4:15 PM   Periwound Care, Wound barrier film applied 2018  4:15 PM       NPWT (Negative Pressure Wound Therapy) R groin (Active)   Therapy Setting continuous therapy 2018  8:00 AM   Dressing foam, black 2018  8:00 AM   Pressure Setting 75 mmHg 2018  8:00 AM   Sponges Inserted 4 2018  4:15 PM             Physical Therapy Education     Title: PT OT SLP Therapies (Active)     Topic: Physical Therapy (Active)     Point: Mobility training (Done)    Learning Progress  Summary     Learner Status Readiness Method Response Comment Documented by    Patient Done Acceptance E VU,NR  LB 04/20/18 1050     Done Acceptance E VU,NR  LB 04/19/18 0956     Done Acceptance E VU,NR  LB 04/18/18 1501     Done Acceptance E VU,NR  LB 04/17/18 0917     Done Acceptance E VU,NR stairs, car, curb LB 04/16/18 0920     Done Acceptance E,D VU,DU,NR  JK 04/14/18 1355     Done Acceptance E VU,NR  LB 04/13/18 1539     Done Acceptance E,TB VU,NR  EE 04/12/18 1629    Family Done Eager E,TB,D VU,DU showed dgt how to assist pt with ambulation with rolling walker on level surface  04/21/18 1502          Point: Body mechanics (Done)    Learning Progress Summary     Learner Status Readiness Method Response Comment Documented by    Patient Done Acceptance E,TB VU,NR  EE 04/12/18 1629          Point: Precautions (Done)    Learning Progress Summary     Learner Status Readiness Method Response Comment Documented by    Patient Done Acceptance E,TB VU,NR  EE 04/12/18 1629                      User Key     Initials Effective Dates Name Provider Type Discipline     04/24/15 -  Zee Ferguson, PT Physical Therapist PT     03/07/18 -  Kera Cabrera, PTA Physical Therapy Assistant PT     04/03/18 -  Enma Bernstein, PT Physical Therapist PT     04/03/18 -  Rafaela Vu, PT Physical Therapist PT                    PT Recommendation and Plan               Time Calculation:         PT Charges     Row Name 04/21/18 1503             Time Calculation    Start Time 1315  -DIONE      Stop Time 1345  -DIONE      Time Calculation (min) 30 min  -DIONE      PT Received On 04/21/18  -      PT - Next Appointment 04/23/18  -        User Key  (r) = Recorded By, (t) = Taken By, (c) = Cosigned By    Initials Name Provider Type     Zee Ferguson, PT Physical Therapist          Therapy Charges for Today     Code Description Service Date Service Provider Modifiers Qty    [id number] HC PT THER PROC EA 15 MIN 4/21/2018 Zee CASTANEDA  Marty, PT GP 2               Zee Ferguson, PT  4/21/2018

## 2018-04-21 NOTE — PLAN OF CARE
Problem: Patient Care Overview  Goal: Plan of Care Review  Outcome: Ongoing (interventions implemented as appropriate)   04/20/18 0630   Patient Care Overview   IRF Plan of Care Review progress ongoing, continue   Progress, Functional Goals demonstrating adequate progress   Coping/Psychosocial   Plan of Care Reviewed With patient   OTHER   Outcome Summary Patient is calm and cooperative. Denied pain or discomfort. Using the call light for assist.        Problem: Skin Injury Risk (Adult)  Goal: Skin Health and Integrity  Outcome: Ongoing (interventions implemented as appropriate)      Problem: Fall Risk (Adult)  Goal: Absence of Fall  Outcome: Ongoing (interventions implemented as appropriate)      Problem: Mobility, Physical Impaired (Adult)  Goal: Enhanced Mobility Skills  Outcome: Ongoing (interventions implemented as appropriate)

## 2018-04-21 NOTE — PROGRESS NOTES
Inpatient Rehabilitation Functional Measures Assessment    Functional Measures  BERONICA Eating:  [place]  BERONICA Grooming: [place]  BERONICA Bathing:  [place]  [facility] Upper Body Dressing:  [facility] Lower Body Dressing:  [facility] Toileting:  [facility] Bladder Management  Level of Assistance:  [place]  Frequency/Number of Accidents this Shift:  [facility] Bowel Management  Level of Assistance: [place]  Frequency/Number of Accidents this Shift: [place]    [facility] Bed/Chair/Wheelchair Transfer:  Bed/chair/wheelchair Transfer Score = 5.  Patient is supervision/set-up for transferring to and from the  bed/chair/wheelchair, requiring: Stand by assistance. Patient requires the  following assistive device(s): Bed rails.  BERONICA Toilet Transfer:  [facility] Tub/Shower Transfer:  [place]    Previously Documented Mode of Locomotion at Discharge: Field  BERONICA Expected Mode of Locomotion at Discharge: [facility] Walk/Wheelchair:  WHEELCHAIR OBSERVATION   Activity was not observed.    WALK OBSERVATION   Walk Distance Scale = 3.  Distance walked is greater than 150 feet. Walk Score  = 4.  Patient performs 75% or more of effort and requires minimal assistance.  Incidental help/contact guard/steadying was provided. Patient walked a distance  of  200 feet. Patient requires the following assistive device(s): Rolling  walker.  BERONICA Stairs:  Activity was not observed.    BERONICA Comprehension:  [place]  BERONICA Expression:  [place]  [facility] Social Interaction:  [facility] Problem Solving:  [facility] Memory:  [place]    Therapy Mode Minutes  Occupational Therapy: [place]  Physical Therapy: Individual: 30 minutes.  Speech Language Pathology:  [place]    Signed by: Zee Ferguson PT

## 2018-04-21 NOTE — PROGRESS NOTES
LOS: 10 days   Patient Care Team:  Stanislaw Esposito MD as PCP - General    Chief Complaint: SAH    Subjective : Tolerating therapies, no difficulty sleeping last night    History of Present Illness    Subjective    History taken from: patient    Objective     Vital Signs  Temp:  [97.7 °F (36.5 °C)-98.5 °F (36.9 °C)] 98.5 °F (36.9 °C)  Heart Rate:  [72-88] 83  Resp:  [16-20] 18  BP: (133-154)/(71-76) 149/72    Objective   MENTAL STATUS-awake, alert, eating lunch with daughter  HEENT - NCAT   LUNGS -CTA  HEART -RRR  ABD - NABS, soft, NT  EXT -   no BLE edema  Sitting upright in bedside chair    Results Review:     no new labs today    Medication Review: renewed pain meds    Assessment/Plan     Active Problems:    Subarachnoid hemorrhage      Assessment & Plan   1. HTN - stable Bps today  2. Renewed pain meds for prn use.  Patient doing well.  3. Continue rehab plan    Leora Amaya MD  04/21/18  10:55 AM    Time: 10min

## 2018-04-21 NOTE — PROGRESS NOTES
Inpatient Rehabilitation Functional Measures Assessment    Functional Measures  BERONICA Eating:  Eating Score = 7. Patient is completely independent for eating.  There are no activity limitations.  BERONICA Grooming: [place]  BERONICA Bathing:  [place]  BERONICA Upper Body Dressing:  [place]  BERONICA Lower Body Dressing:  [place]  BERONICA Toileting:  Toileting Score = 5.  Patient is supervision/set-up for  toileting, requiring: Stand by assistance. No assistive devices were required.    BERONICA Bladder Management  Level of Assistance:  Bladder Score = 5.  Patient is supervision/set-up for  bladder management, requiring: Stand by assistance. Patient requires the  following assistive device(s):  Adult brief.  Frequency/Number of Accidents this Shift:  Bladder accidents this shift:  0 .  Patient has not had an accident this shift.    BERONICA Bowel Management  Level of Assistance: Bowel Score = 5.  Patient is supervision/set-up for bowel  management, requiring: Patient requires the following assistive device(s):  Adult brief.  Frequency/Number of Accidents this Shift: Bowel accidents this shift: 0 .  Patient has not had an accident this shift.    BERONICA Bed/Chair/Wheelchair Transfer:  Bed/chair/wheelchair Transfer Score = 5.  Patient is supervision/set-up for transferring to and from the  bed/chair/wheelchair, requiring: Stand by assistance. Verbal cuing, prompting,  or instructing. Patient requires the following assistive device(s): Walker.  BERONICA Toilet Transfer:  Toilet Transfer Score = 5.  Patient is supervision/set-up  for transferring to and from the toilet/commode, requiring: Stand by assistance.  Patient requires the following assistive device(s): Walker.  BERONICA Tub/Shower Transfer:  Shower Transfer Score = 5.  Patient is  supervision/set-up for transferring to and from the shower, requiring: Stand by  assistance. Patient requires the following assistive device(s): Walker.    Previously Documented Mode of Locomotion at Discharge: Field  [facility] Expected Mode of  Locomotion at Discharge: [place]  BERONICA Walk/Wheelchair:  [place]anch  BERONICA Stairs:  Branch    BERONICA Comprehension:  [place]anch  BERONICA Expression:  [place]anch  BERONICA Social Interaction:  [place]anch  BERONICA Problem Solving:  [place]anch  BERONICA Memory:  [place]anch    Therapy Mode Minutes  Occupational Therapy: [place]  Physical Therapy: [place]  Speech Language Pathology:  [place]    Signed by: RUTH Fletcher

## 2018-04-22 LAB
BACTERIA UR QL AUTO: ABNORMAL /HPF
BILIRUB UR QL STRIP: NEGATIVE
CLARITY UR: CLEAR
COLOR UR: ABNORMAL
GLUCOSE UR STRIP-MCNC: NEGATIVE MG/DL
HGB UR QL STRIP.AUTO: NEGATIVE
HYALINE CASTS UR QL AUTO: ABNORMAL /LPF
KETONES UR QL STRIP: NEGATIVE
LEUKOCYTE ESTERASE UR QL STRIP.AUTO: ABNORMAL
NITRITE UR QL STRIP: NEGATIVE
PH UR STRIP.AUTO: 5.5 [PH] (ref 5–8)
PROT UR QL STRIP: ABNORMAL
RBC # UR: ABNORMAL /HPF
REF LAB TEST METHOD: ABNORMAL
SP GR UR STRIP: 1.02 (ref 1–1.03)
SQUAMOUS #/AREA URNS HPF: ABNORMAL /HPF
UROBILINOGEN UR QL STRIP: ABNORMAL
WBC UR QL AUTO: ABNORMAL /HPF

## 2018-04-22 PROCEDURE — 81001 URINALYSIS AUTO W/SCOPE: CPT | Performed by: PHYSICAL MEDICINE & REHABILITATION

## 2018-04-22 PROCEDURE — 25010000003 CEFTRIAXONE PER 250 MG: Performed by: PHYSICAL MEDICINE & REHABILITATION

## 2018-04-22 RX ADMIN — METRONIDAZOLE 500 MG: 500 TABLET, FILM COATED ORAL at 21:15

## 2018-04-22 RX ADMIN — LACOSAMIDE 100 MG: 100 TABLET, FILM COATED ORAL at 21:15

## 2018-04-22 RX ADMIN — LACOSAMIDE 100 MG: 100 TABLET, FILM COATED ORAL at 08:30

## 2018-04-22 RX ADMIN — PYRIDOXINE HCL TAB 50 MG 50 MG: 50 TAB at 08:30

## 2018-04-22 RX ADMIN — OXYCODONE HYDROCHLORIDE AND ACETAMINOPHEN 1 TABLET: 5; 325 TABLET ORAL at 21:15

## 2018-04-22 RX ADMIN — LEVETIRACETAM 1000 MG: 500 TABLET, FILM COATED ORAL at 08:30

## 2018-04-22 RX ADMIN — FOLIC ACID 1 MG: 1 TABLET ORAL at 08:30

## 2018-04-22 RX ADMIN — METRONIDAZOLE 500 MG: 500 TABLET, FILM COATED ORAL at 05:36

## 2018-04-22 RX ADMIN — MONTELUKAST 10 MG: 10 TABLET, FILM COATED ORAL at 08:30

## 2018-04-22 RX ADMIN — PANTOPRAZOLE SODIUM 40 MG: 40 TABLET, DELAYED RELEASE ORAL at 05:37

## 2018-04-22 RX ADMIN — CEFTRIAXONE SODIUM 2 G: 2 INJECTION, SOLUTION INTRAVENOUS at 14:09

## 2018-04-22 RX ADMIN — Medication 1 TABLET: at 08:30

## 2018-04-22 RX ADMIN — LEVETIRACETAM 1000 MG: 500 TABLET, FILM COATED ORAL at 21:15

## 2018-04-22 RX ADMIN — HYDRALAZINE HYDROCHLORIDE 10 MG: 10 TABLET, FILM COATED ORAL at 21:16

## 2018-04-22 RX ADMIN — OXYCODONE HYDROCHLORIDE AND ACETAMINOPHEN 500 MG: 500 TABLET ORAL at 08:30

## 2018-04-22 RX ADMIN — ASPIRIN 325 MG: 325 TABLET ORAL at 08:31

## 2018-04-22 RX ADMIN — METRONIDAZOLE 500 MG: 500 TABLET, FILM COATED ORAL at 14:09

## 2018-04-22 RX ADMIN — CLOPIDOGREL 75 MG: 75 TABLET, FILM COATED ORAL at 08:31

## 2018-04-22 RX ADMIN — VITAMIN D, TAB 1000IU (100/BT) 1000 UNITS: 25 TAB at 08:31

## 2018-04-22 RX ADMIN — CETIRIZINE HYDROCHLORIDE 5 MG: 10 TABLET, FILM COATED ORAL at 08:31

## 2018-04-22 RX ADMIN — ESCITALOPRAM 20 MG: 20 TABLET, FILM COATED ORAL at 08:31

## 2018-04-22 RX ADMIN — HYDRALAZINE HYDROCHLORIDE 10 MG: 10 TABLET, FILM COATED ORAL at 05:37

## 2018-04-22 NOTE — PLAN OF CARE
Problem: Patient Care Overview  Goal: Plan of Care Review  Outcome: Ongoing (interventions implemented as appropriate)   04/22/18 5011   Patient Care Overview   IRF Plan of Care Review progress ongoing, continue   Progress, Functional Goals demonstrating adequate progress   Coping/Psychosocial   Plan of Care Reviewed With patient   OTHER   Outcome Summary Pleasant and cooperative. Looking forward to discharge tomorrow. Wound vac intact right groin. Daughter noted very small light blood spot on pullup. Daughter discussed with MD. UA sent, negative for blood, 6-12 RBCs. Ambulates to BR with walker CGA of 1.       Problem: Skin Injury Risk (Adult)  Goal: Skin Health and Integrity  Outcome: Ongoing (interventions implemented as appropriate)      Problem: Fall Risk (Adult)  Goal: Absence of Fall  Outcome: Ongoing (interventions implemented as appropriate)      Problem: Mobility, Physical Impaired (Adult)  Goal: Enhanced Mobility Skills  Outcome: Ongoing (interventions implemented as appropriate)

## 2018-04-22 NOTE — PROGRESS NOTES
LOS: 11 days   Patient Care Team:  Stanislaw Esposito MD as PCP - General    Chief Complaint: sah    Subjective   C/o seeing blood when wiping this morning.  She had urinated and couldn't tell where blood came from.  States it was just smeared blood and not much.  Had BM this morning with no blood/dark stools.  Denies lightheadedness.  Eating well.  H/o hysterectomy in past and last saw PCP with pelvic exam within the year.  Denies any dysuria or urinary frequency, denies new back pain.    History of Present Illness    Subjective    History taken from: patient    Objective     Vital Signs  Temp:  [97.8 °F (36.6 °C)-98.5 °F (36.9 °C)] 97.8 °F (36.6 °C)  Heart Rate:  [72-95] 92  Resp:  [16] 16  BP: (138-191)/(64-81) 150/73    Objective   MENTAL STATUS-awake, alert, sitting in wheelchair comfortably  HEENT - NCAT   LUNGS -CTA  HEART -RRR  ABD - NABS, soft, NT  EXT -   no BLE edema  Right groin wound vac is in place with no site of active drainage around vac site  Psych - bright mood and affect, laughing at jokes    Results Review:     No new labs    Medication Review: reviewed    Assessment/Plan     Active Problems:    Subarachnoid hemorrhage      Assessment & Plan   1. HTN - stable Bps today  2. Check u/a with h/o seeing blood after wiping from urination.  Check for RBCs in urine.  Has h/o hysterectomy.  Also question if there was any leakage from wound vac?  Monitor, patient otherwise asymptomatic.  3. Continue rehab plan    Leora Amaya MD  04/22/18  10:04 AM    Time: 10min

## 2018-04-22 NOTE — PROGRESS NOTES
Inpatient Rehabilitation Plan of Care Note    Plan of Care  Branch    Safety    Performed Intervention(s)  Safety rounds; call light/items within easy reach  Bed Alarm , chair alarm      Body Function Structure    Performed Intervention(s)  Turning      Sphincter Control    Performed Intervention(s)  Monitor I&O  Timed voids  Use incontinent products      Psychosocial    Performed Intervention(s)  Encourage to verbalize concerns  Offer diversional activities    Signed by: Marixa Palomo RN

## 2018-04-22 NOTE — PROGRESS NOTES
Inpatient Rehabilitation Plan of Care Note    Plan of Care  Branch    [place]    Signed by: Ivone Gamze, RN

## 2018-04-22 NOTE — PROGRESS NOTES
Inpatient Rehabilitation Functional Measures Assessment    Functional Measures  BERONICA Eating:  [facility] Grooming: [facility] Bathing:  [facility] Upper Body Dressing:  [facility] Lower Body Dressing:  [facility] Toileting:  [facility] Bladder Management  Level of Assistance:  [place]  Frequency/Number of Accidents this Shift:  [facility] Bowel Management  Level of Assistance: [place]  Frequency/Number of Accidents this Shift: [facility] Bed/Chair/Wheelchair Transfer:  [facility] Toilet Transfer:  [facility] Tub/Shower Transfer:  [place]    Previously Documented Mode of Locomotion at Discharge: Field  BERONICA Expected Mode of Locomotion at Discharge: [facility] Walk/Wheelchair:  [facility] Stairs:  [facility] Comprehension:  Auditory comprehension is the usual mode. Comprehension  Score = 6, Modified [place].  Patient comprehends complex/abstract  information in their primary language with only mild difficulty.  BERONICA Expression:  Vocal expression is the usual mode. Expression Score = 6,  Modified Independent.  Patient expresses complex/abstract information in their  primary language with only mild difficulty with tasks.  BERONICA Social Interaction:  Social Interaction Score = 6, Modified Independent.  Patient is modified independent for social interaction, requiring: Medications.    BERONICA Problem Solving:  Problem Solving Score = 6, Modified [place].  Patient  makes appropriate decisions in order to solve complex problems with mild  difficulty but self-corrects.  BERONICA Memory:  Memory Score = 6, Modified [place].  Patient is modified  independent for memory, having only mild difficulty and using self-initiated or  environmental cues to remember.    Therapy Mode Minutes  Occupational Therapy: [place]  Physical Therapy: [place]  Speech Language Pathology:  [place]    Signed by: Marixa Palomo RN

## 2018-04-22 NOTE — PLAN OF CARE
Problem: Patient Care Overview  Goal: Plan of Care Review  Outcome: Ongoing (interventions implemented as appropriate)   04/21/18 3836   Patient Care Overview   IRF Plan of Care Review progress ongoing, continue   Progress, Functional Goals demonstrating adequate progress   Coping/Psychosocial   Plan of Care Reviewed With patient   OTHER   Outcome Summary Patient is pleasant and cooperative. Complained of pain at right groin area. PRN percocet administered with positive result. Continues in isolation. Call light placed within reach.        Problem: Skin Injury Risk (Adult)  Goal: Skin Health and Integrity  Outcome: Ongoing (interventions implemented as appropriate)      Problem: Fall Risk (Adult)  Goal: Absence of Fall  Outcome: Ongoing (interventions implemented as appropriate)      Problem: Mobility, Physical Impaired (Adult)  Goal: Enhanced Mobility Skills  Outcome: Ongoing (interventions implemented as appropriate)      Problem: Stroke (IRF) (Adult)  Goal: Promote Optimal Functional [place]  Outcome: Ongoing (interventions implemented as appropriate)

## 2018-04-22 NOTE — PROGRESS NOTES
Inpatient Rehabilitation Functional Measures Assessment    Functional Measures  BERONICA Eating:  [facility] Grooming: [facility] Bathing:  [facility] Upper Body Dressing:  [facility] Lower Body Dressing:  [facility] Toileting:  [facility] Bladder Management  Level of Assistance:  [place]  Frequency/Number of Accidents this Shift:  [facility] Bowel Management  Level of Assistance: [place]  Frequency/Number of Accidents this Shift: [facility] Bed/Chair/Wheelchair Transfer:  [facility] Toilet Transfer:  [facility] Tub/Shower Transfer:  [place]    Previously Documented Mode of Locomotion at Discharge: Field  BERONICA Expected Mode of Locomotion at Discharge: [facility] Walk/Wheelchair:  [facility] Stairs:  [facility] Comprehension:  Auditory comprehension is the usual mode. Comprehension  Score = 6, Modified [place].  Patient comprehends complex/abstract  information in their primary language with only mild difficulty.  BERONICA Expression:  Vocal expression is the usual mode. Expression Score = 6,  Modified Independent.  Patient expresses complex/abstract information in their  primary language with only mild difficulty with tasks.  BERONICA Social Interaction:  Social Interaction Score = 7, Independent. Patient is  completely independent for social interaction.  There are no activity  limitations.  BERONICA Problem Solving:  Activity was not observed.  BERONICA Memory:  Memory Score = 6, Modified [place].  Patient is modified  independent for memory, having only mild difficulty and using self-initiated or  environmental cues to remember.    Therapy Mode Minutes  Occupational Therapy: [place]  Physical Therapy: [place]  Speech Language Pathology:  [place]    Signed by: Ivone Gamez RN

## 2018-04-23 VITALS
HEART RATE: 92 BPM | SYSTOLIC BLOOD PRESSURE: 149 MMHG | TEMPERATURE: 98.4 F | WEIGHT: 136.02 LBS | HEIGHT: 62 IN | RESPIRATION RATE: 18 BRPM | BODY MASS INDEX: 25.03 KG/M2 | DIASTOLIC BLOOD PRESSURE: 78 MMHG | OXYGEN SATURATION: 96 %

## 2018-04-23 LAB
ALBUMIN SERPL-MCNC: 2.3 G/DL (ref 3.5–5.2)
ALBUMIN/GLOB SERPL: 1 G/DL
ALP SERPL-CCNC: 70 U/L (ref 39–117)
ALT SERPL W P-5'-P-CCNC: 12 U/L (ref 1–33)
ANION GAP SERPL CALCULATED.3IONS-SCNC: 11.1 MMOL/L
AST SERPL-CCNC: 19 U/L (ref 1–32)
BASOPHILS # BLD AUTO: 0.04 10*3/MM3 (ref 0–0.2)
BASOPHILS NFR BLD AUTO: 1.1 % (ref 0–1.5)
BILIRUB SERPL-MCNC: 0.3 MG/DL (ref 0.1–1.2)
BUN BLD-MCNC: 9 MG/DL (ref 8–23)
BUN/CREAT SERPL: 11 (ref 7–25)
CALCIUM SPEC-SCNC: 8.5 MG/DL (ref 8.6–10.5)
CHLORIDE SERPL-SCNC: 107 MMOL/L (ref 98–107)
CO2 SERPL-SCNC: 21.9 MMOL/L (ref 22–29)
CREAT BLD-MCNC: 0.82 MG/DL (ref 0.57–1)
CRP SERPL-MCNC: 1.89 MG/DL (ref 0–0.5)
DEPRECATED RDW RBC AUTO: 53.7 FL (ref 37–54)
EOSINOPHIL # BLD AUTO: 0.16 10*3/MM3 (ref 0–0.7)
EOSINOPHIL NFR BLD AUTO: 4.2 % (ref 0.3–6.2)
ERYTHROCYTE [DISTWIDTH] IN BLOOD BY AUTOMATED COUNT: 16.2 % (ref 11.7–13)
ERYTHROCYTE [SEDIMENTATION RATE] IN BLOOD: 44 MM/HR (ref 0–30)
GFR SERPL CREATININE-BSD FRML MDRD: 68 ML/MIN/1.73
GLOBULIN UR ELPH-MCNC: 2.4 GM/DL
GLUCOSE BLD-MCNC: 86 MG/DL (ref 65–99)
HCT VFR BLD AUTO: 26 % (ref 35.6–45.5)
HGB BLD-MCNC: 8.5 G/DL (ref 11.9–15.5)
IMM GRANULOCYTES # BLD: 0.04 10*3/MM3 (ref 0–0.03)
IMM GRANULOCYTES NFR BLD: 1.1 % (ref 0–0.5)
LYMPHOCYTES # BLD AUTO: 1.47 10*3/MM3 (ref 0.9–4.8)
LYMPHOCYTES NFR BLD AUTO: 39 % (ref 19.6–45.3)
MCH RBC QN AUTO: 29.7 PG (ref 26.9–32)
MCHC RBC AUTO-ENTMCNC: 32.7 G/DL (ref 32.4–36.3)
MCV RBC AUTO: 90.9 FL (ref 80.5–98.2)
MONOCYTES # BLD AUTO: 0.29 10*3/MM3 (ref 0.2–1.2)
MONOCYTES NFR BLD AUTO: 7.7 % (ref 5–12)
NEUTROPHILS # BLD AUTO: 1.77 10*3/MM3 (ref 1.9–8.1)
NEUTROPHILS NFR BLD AUTO: 46.9 % (ref 42.7–76)
PLATELET # BLD AUTO: 227 10*3/MM3 (ref 140–500)
PMV BLD AUTO: 8.8 FL (ref 6–12)
POTASSIUM BLD-SCNC: 3.5 MMOL/L (ref 3.5–5.2)
PROT SERPL-MCNC: 4.7 G/DL (ref 6–8.5)
RBC # BLD AUTO: 2.86 10*6/MM3 (ref 3.9–5.2)
SODIUM BLD-SCNC: 140 MMOL/L (ref 136–145)
WBC NRBC COR # BLD: 3.77 10*3/MM3 (ref 4.5–10.7)

## 2018-04-23 PROCEDURE — 97110 THERAPEUTIC EXERCISES: CPT

## 2018-04-23 PROCEDURE — 25010000003 CEFTRIAXONE PER 250 MG: Performed by: PHYSICAL MEDICINE & REHABILITATION

## 2018-04-23 PROCEDURE — 97535 SELF CARE MNGMENT TRAINING: CPT | Performed by: OCCUPATIONAL THERAPIST

## 2018-04-23 PROCEDURE — 85652 RBC SED RATE AUTOMATED: CPT | Performed by: PHYSICAL MEDICINE & REHABILITATION

## 2018-04-23 PROCEDURE — 85025 COMPLETE CBC W/AUTO DIFF WBC: CPT | Performed by: PHYSICAL MEDICINE & REHABILITATION

## 2018-04-23 PROCEDURE — 86140 C-REACTIVE PROTEIN: CPT | Performed by: PHYSICAL MEDICINE & REHABILITATION

## 2018-04-23 PROCEDURE — 80053 COMPREHEN METABOLIC PANEL: CPT | Performed by: PHYSICAL MEDICINE & REHABILITATION

## 2018-04-23 RX ORDER — CETIRIZINE HYDROCHLORIDE 10 MG/1
5 TABLET ORAL DAILY
Start: 2018-04-23

## 2018-04-23 RX ORDER — HYDRALAZINE HYDROCHLORIDE 10 MG/1
10 TABLET, FILM COATED ORAL EVERY 6 HOURS PRN
Start: 2018-04-23

## 2018-04-23 RX ORDER — HYDRALAZINE HYDROCHLORIDE 10 MG/1
10 TABLET, FILM COATED ORAL EVERY 8 HOURS SCHEDULED
Qty: 90 TABLET | Refills: 1 | Status: SHIPPED | OUTPATIENT
Start: 2018-04-23

## 2018-04-23 RX ORDER — METRONIDAZOLE 500 MG/1
500 TABLET ORAL EVERY 8 HOURS SCHEDULED
Qty: 49 TABLET | Refills: 0 | Status: SHIPPED | OUTPATIENT
Start: 2018-04-23 | End: 2018-05-10

## 2018-04-23 RX ORDER — LACOSAMIDE 100 MG/1
100 TABLET ORAL EVERY 12 HOURS SCHEDULED
Qty: 60 TABLET | Refills: 1 | Status: SHIPPED | OUTPATIENT
Start: 2018-04-23

## 2018-04-23 RX ORDER — CEFTRIAXONE SODIUM 2 G/50ML
2 INJECTION, SOLUTION INTRAVENOUS EVERY 24 HOURS
Qty: 800 ML | Refills: 0 | Status: SHIPPED | OUTPATIENT
Start: 2018-04-23 | End: 2018-05-09

## 2018-04-23 RX ORDER — LEVETIRACETAM 1000 MG/1
1000 TABLET ORAL 2 TIMES DAILY
Qty: 60 TABLET | Refills: 1 | Status: SHIPPED | OUTPATIENT
Start: 2018-04-23

## 2018-04-23 RX ORDER — ASPIRIN 325 MG
325 TABLET ORAL DAILY
Qty: 30 TABLET | Refills: 1 | Status: SHIPPED | OUTPATIENT
Start: 2018-04-24

## 2018-04-23 RX ORDER — PANTOPRAZOLE SODIUM 40 MG/1
40 TABLET, DELAYED RELEASE ORAL DAILY
Qty: 30 TABLET | Refills: 1 | Status: SHIPPED | OUTPATIENT
Start: 2018-04-23

## 2018-04-23 RX ORDER — OXYCODONE HYDROCHLORIDE AND ACETAMINOPHEN 5; 325 MG/1; MG/1
1 TABLET ORAL ONCE
Status: COMPLETED | OUTPATIENT
Start: 2018-04-23 | End: 2018-04-23

## 2018-04-23 RX ORDER — DIPHENOXYLATE HYDROCHLORIDE AND ATROPINE SULFATE 2.5; .025 MG/1; MG/1
1 TABLET ORAL DAILY
Qty: 30 TABLET
Start: 2018-04-24

## 2018-04-23 RX ORDER — CLOPIDOGREL BISULFATE 75 MG/1
75 TABLET ORAL DAILY
Qty: 30 TABLET | Refills: 1 | Status: SHIPPED | OUTPATIENT
Start: 2018-04-24

## 2018-04-23 RX ADMIN — METRONIDAZOLE 500 MG: 500 TABLET, FILM COATED ORAL at 15:14

## 2018-04-23 RX ADMIN — HYDRALAZINE HYDROCHLORIDE 10 MG: 10 TABLET, FILM COATED ORAL at 05:52

## 2018-04-23 RX ADMIN — PANTOPRAZOLE SODIUM 40 MG: 40 TABLET, DELAYED RELEASE ORAL at 05:53

## 2018-04-23 RX ADMIN — ASPIRIN 325 MG: 325 TABLET ORAL at 08:16

## 2018-04-23 RX ADMIN — OXYCODONE HYDROCHLORIDE AND ACETAMINOPHEN 1 TABLET: 5; 325 TABLET ORAL at 13:33

## 2018-04-23 RX ADMIN — PYRIDOXINE HCL TAB 50 MG 50 MG: 50 TAB at 08:16

## 2018-04-23 RX ADMIN — ESCITALOPRAM 20 MG: 20 TABLET, FILM COATED ORAL at 08:17

## 2018-04-23 RX ADMIN — METRONIDAZOLE 500 MG: 500 TABLET, FILM COATED ORAL at 05:52

## 2018-04-23 RX ADMIN — HYDRALAZINE HYDROCHLORIDE 10 MG: 10 TABLET, FILM COATED ORAL at 15:13

## 2018-04-23 RX ADMIN — CETIRIZINE HYDROCHLORIDE 5 MG: 10 TABLET, FILM COATED ORAL at 08:16

## 2018-04-23 RX ADMIN — MONTELUKAST 10 MG: 10 TABLET, FILM COATED ORAL at 08:17

## 2018-04-23 RX ADMIN — LEVETIRACETAM 1000 MG: 500 TABLET, FILM COATED ORAL at 08:16

## 2018-04-23 RX ADMIN — Medication 1 TABLET: at 08:16

## 2018-04-23 RX ADMIN — VITAMIN D, TAB 1000IU (100/BT) 1000 UNITS: 25 TAB at 08:17

## 2018-04-23 RX ADMIN — FOLIC ACID 1 MG: 1 TABLET ORAL at 08:17

## 2018-04-23 RX ADMIN — OXYCODONE HYDROCHLORIDE AND ACETAMINOPHEN 500 MG: 500 TABLET ORAL at 08:17

## 2018-04-23 RX ADMIN — CLOPIDOGREL 75 MG: 75 TABLET, FILM COATED ORAL at 08:17

## 2018-04-23 RX ADMIN — CEFTRIAXONE SODIUM 2 G: 2 INJECTION, SOLUTION INTRAVENOUS at 13:41

## 2018-04-23 RX ADMIN — LACOSAMIDE 100 MG: 100 TABLET, FILM COATED ORAL at 08:18

## 2018-04-23 NOTE — THERAPY DISCHARGE NOTE
Inpatient Rehabilitation - Occupational Therapy Treatment Note/Discharge  [facility]     Patient Name: Herlinda Ta  : 1940  MRN: [id number]  Today's Date: 2018               Admit Date: 2018    Visit Dx:     ICD-10-CM ICD-9-CM   1. Subarachnoid hemorrhage I60.9 430     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   • Cerebral vasospasm   • Cervical pain (neck)   • Depressed   • Depression   • Dyspepsia   • Acid reflux   • Groin hematoma   • HTN, goal below 140/90   • HLD (hyperlipidemia)   • Infection of artery   • Iron deficiency anemia   • MRSA (methicillin resistant staph aureus) culture positive   • Rheumatoid arthritis   • Urinary incontinence, mixed   • Subarachnoid hemorrhage       Therapy Treatment        IRF Treatment Summary     Row Name 18 [id number]          Evaluation/Treatment Time and Intent    Document Type therapy note (daily note);discharge treatment  -SO discharge treatment;therapy note (daily note)  -LB     Mode of Treatment occupational therapy  -SO physical therapy  -LB     Patient/Family Observations Pt supine in bed, family and nsg present  -SO  --     Recorded by [SO] ANNIE Moscoso [LB] Kera Cabrera, HUMA     Row Name 18 1211             Cognition/Psychosocial- PT/OT    Affect/Mental Status (Cognitive) WNL  -SO      Orientation Status (Cognition) oriented x 3  -SO      Cognitive Function (Cognitive) safety deficit  -SO      Recorded by [SO] ANNIE Moscoso      Row Name 18 1211             Bed Mobility Assessment/Treatment    Supine-Sit [place] (Bed Mobility) supervision  -SO      Sit-Supine [place] (Bed Mobility) not tested  -SO      Recorded by [SO] ANNIE Moscoso      Row Name  04/23/18 1211             Transfer Assessment/Treatment    Transfer Assessment/Treatment sit-stand transfer;stand-sit transfer;shower transfer  -SO      Sit-Stand [place] (Transfers) supervision  -SO      Stand-Sit [place] (Transfers) supervision  -SO      [place] Level (Shower Transfer) supervision;verbal cues  -SO      Assistive Device (Shower Transfer) shower chair  -SO      Recorded by [SO] Margo Jacob, OTR      Row Name 04/23/18 1211             Shower Transfer    Type (Shower Transfer) stand pivot/stand step  -SO      Recorded by [SO] Margo Jacob, OTR      Row Name 04/23/18 1211             Basic Activities of Daily Living (BADLs)    Basic Activities of Daily Living bathing;upper body dressing;lower body dressing;grooming  -SO      Recorded by [SO] Margo Jacob OTR      Row Name 04/23/18 1211             Bathing Assessment/Treatment    Bathing [place] Level bathing skills;supervision;verbal cues  -SO      Assistive Device (Bathing) grab bar/tub rail;hand held shower spray hose;shower chair  -SO      Bathing Position supported standing;unsupported sitting  -SO      Recorded by [SO] Margo Jacob OTR      Row Name 04/23/18 1211             Upper Body Dressing Assessment/Treatment    Upper Body Dressing Task upper body dressing skills;set up assistance  -SO      Upper Body Dressing Position supported sitting  -SO      Recorded by [SO] Margo Jacob OTR      Row Name 04/23/18 1211             Lower Body Dressing Assessment/Treatment    Lower Body Dressing [place] Level doff;don;pants/bottoms;shoes/slippers;socks;underwear;supervision  -SO      Lower Body Dressing Position supported standing;unsupported sitting  -SO      Recorded by [SO] Margo Jacob, OTR      Row Name 04/23/18 1211             Grooming Assessment/Treatment    Grooming [place] Level grooming skills;supervision  -SO      Grooming Position edge of bed sitting   -SO      Recorded by [SO] ANNIE Moscoso      Row Name 04/23/18 1211             Positioning and Restraints    Pre-Treatment Position in bed  -SO      Post Treatment Position bed  -SO      In Bed sitting EOB;call light within reach;encouraged to call for assist;with family/caregiver  -SO      Recorded by [SO] ANNIE Moscoso        User Key  (r) = Recorded By, (t) = Taken By, (c) = Cosigned By    Initials Name Effective Dates    SO ANNIE Moscoso 04/13/15 -     LB Kera A Rick, PTA 03/07/18 -           Wound Right groin (Active)   Dressing Appearance intact;dry 4/23/2018  8:25 AM   Periwound Care, Wound barrier film applied 4/23/2018  8:25 AM       NPWT (Negative Pressure Wound Therapy) R groin (Active)   Therapy Setting continuous therapy 4/23/2018  8:25 AM   Dressing foam, black 4/23/2018  8:25 AM   Pressure Setting 75 mmHg 4/23/2018  8:25 AM               OT IRF GOALS     Row Name 04/23/18 1200 04/19/18 1523 04/12/18 1524       Transfer Goal 1 (OT-IRF)    Activity/Assistive Device (Transfer Goal 1, OT-IRF)  --  -- toilet;shower chair;walk-in shower;tub  -SO    [place] Level (Transfer Goal 1, OT-IRF)  --  -- supervision required;verbal cues required  -SO    Time Frame (Transfer Goal 1, OT-IRF)  --  -- long term goal (LTG);2 weeks  -SO    Progress/Outcomes (Transfer Goal 1, OT-IRF) goal met  -SO goal met  -AF  --       LB Dressing Goal 1 (OT-IRF)    Activity/Device (LB Dressing Goal 1, OT-IRF)  --  -- lower body dressing  -SO    Wood (LB Dressing Goal 1, OT-IRF)  --  -- supervision required  -SO    Time Frame (LB Dressing Goal 1, OT-IRF)  --  -- long term goal (LTG);2 weeks  -SO    Progress/Outcomes (LB Dressing Goal 1, OT-IRF) goal met  -SO goal met  -AF  --       Toileting Goal 1 (OT-IRF)    Activity/Device (Toileting Goal 1, OT-IRF)  --  -- toileting skills, all;grab bar/safety frame  -SO    [place] Level (Toileting Goal 1, OT-IRF)  --  -- supervision  required  -SO    Time Frame (Toileting Goal 1, OT-IRF)  --  -- long term goal (LTG);2 weeks  -SO    Progress/Outcomes (Toileting Goal 1, OT-IRF) goal met  -SO goal met  -AF  --       Strength Goal 1 (OT-IRF)    Strength Goal 1 (OT-IRF)  --  -- Pt to increase overall BUE strength to 4-/5 to assist with functional activities and ADLs.  -SO    Time Frame (Strength Goal 1, OT-IRF)  --  -- long term goal (LTG);2 weeks  -SO    Progress/Outcomes (Strength Goal 1, OT-IRF) goal not met  -SO goal not met  -AF  --       Caregiver Training Goal 1 (OT-IRF)    Caregiver Training Goal 1 (OT-IRF)  --  -- Pt and caregiver to be independent with AE, HEP, home safety, etc. at d/c.  -SO    Time Frame (Caregiver Training Goal 1, OT-IRF)  --  -- long term goal (LTG);2 weeks  -SO    Progress/Outcomes (Caregiver Training Goal 1, OT-IRF) goal met  -SO goal met  -AF  --      User Key  (r) = Recorded By, (t) = Taken By, (c) = Cosigned By    Initials Name Provider Type    SO Margo Jacob, OTR Occupational Therapist    AF Keren Lowe OTR Occupational Therapist          Occupational Therapy Education     Title: PT OT SLP Therapies (Active)     Topic: Occupational Therapy (Resolved)     Point: ADL training (Resolved)     Description: Instruct learner(s) on proper safety adaptation and remediation techniques during self care or transfers.   Instruct in proper use of assistive devices.   Learning Progress Summary     Learner Status Readiness Method Response Comment Documented by    Patient Done Acceptance E VU Family present and performed ADL with supervision. No safety conerns. SO 04/23/18 1224     Done Acceptance E VU pt and family participated in meeting with rehab team recommend  OT, a shower chair, supervision with ADLs and transfers. dicussed at times decreased cognition and safety awareness and visual inattention to the R. AF 04/18/18 1525     Done Acceptance E VU,DU  and patient participated in teaching with [facility]  transfers, gait belt and carrying wound vac.  demos understanding and is cleared to assist pt to and from bathroom. NSG aware AF 04/14/18 1219     Done Acceptance E VU OT POC, goals SO 04/12/18 1536    Family Done Acceptance E VU Family present and performed ADL with supervision. No safety conerns. SO 04/23/18 1224     Done Acceptance E VU,DU  and patient participated in teaching with commode transfers, gait belt and carrying wound vac.  demos understanding and is cleared to assist pt to and from bathroom. NSG aware AF 04/14/18 1219     Done Acceptance E VU OT POC, goals SO 04/12/18 1536          Point: Home exercise program (Resolved)     Description: Instruct learner(s) on appropriate technique for monitoring, assisting and/or progressing therapeutic exercises/activities.   Learning Progress Summary     Learner Status Readiness Method Response Comment Documented by    Patient Done Acceptance E,D,H VU hand weight HEP AF 04/19/18 1523     Done Acceptance E VU pt and family participated in meeting with rehab team recommend HH OT, a shower chair, supervision with ADLs and transfers. dicussed at times decreased cognition and safety awareness and visual inattention to the R. AF 04/18/18 1525          Point: Precautions (Resolved)     Description: Instruct learner(s) on prescribed precautions during self-care and functional transfers.   Learning Progress Summary     Learner Status Readiness Method Response Comment Documented by    Patient Done Acceptance E VU pt and family participated in meeting with rehab team recommend HH OT, a shower chair, supervision with ADLs and transfers. dicussed at times decreased cognition and safety awareness and visual inattention to the R. AF 04/18/18 1525          Point: Body mechanics (Resolved)     Description: Instruct learner(s) on proper positioning and spine alignment during self-care, functional mobility activities and/or exercises.   Learning Progress  Summary     Learner Status Readiness Method Response Comment Documented by    Patient Done Acceptance E VU pt and family participated in meeting with rehab team recommend HH OT, a shower chair, supervision with ADLs and transfers. dicussed at times decreased cognition and safety awareness and visual inattention to the R. AF 04/18/18 1525                      User Key     Initials Effective Dates Name Provider Type Discipline    SO 04/13/15 -  Margo Jacob OTSANGITA Occupational Therapist OT    AF 04/03/18 -  Keren Lowe, OTR Occupational Therapist OT                  OT Recommendation and Plan  Anticipated Discharge Disposition (OT):  (Home with home health)  Planned Therapy Interventions (OT Eval): activity tolerance training, adaptive equipment training, cognitive/visual perception retraining, BADL retraining, occupation/activity based interventions, strengthening exercise  Plan of Care Review  Plan of Care Reviewed With: patient  Plan of Care Reviewed With: patient  Outcome Summary: Pt to d/c today, family performed ADL this am with OT supervision, no safety concerns.         Time Calculation:          Time Calculation- OT     Row Name 04/23/18 1227             Time Calculation- OT    OT Start Time 0900  -SO      OT Stop Time 0930  -SO      OT Time Calculation (min) 30 min  -SO      OT Received On 04/23/18  -SO        User Key  (r) = Recorded By, (t) = Taken By, (c) = Cosigned By    Initials Name Provider Type    SO ANNIE Moscoso Occupational Therapist          Therapy Charges for Today     Code Description Service Date Service Provider Modifiers Qty    [id number] HC OT SELF CARE/MGMT/TRAIN EA 15 MIN 4/23/2018 ANNIE Moscoso GO 2               OT Discharge Summary  Anticipated Discharge Disposition (OT):  (Home with home health)  Reason for Discharge: Maximum functional potential achieved, All goals achieved  Outcomes Achieved: Patient able to partially acheive established  goals  Discharge Destination: Home with home health, Home with assist    Margo Jacob, OTR  4/23/2018

## 2018-04-23 NOTE — DISCHARGE SUMMARY
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   Right Groin hematoma s/p evacuation  -Exploration of right groin hematoma, direct repair of right common femoral artery, right SFA endarterectomy and patch angioplasty    Right groin wound debridement and wound VAC placement   right groin wound infection  - Rocephin and Metronidazole x 4 weeks - stop date May 9  S/p repair femoral artery w/ vein patch and sartorius patch on   HTN      HPI:  Mrs. Ta is a pleasant 78 yo female with PMH of HTN, HLD, who presented to the hospital with mental status change along with headache. Head CT showed evidence of subarachnoid hemorrhage. She had left ophthalmic/paraophthalmic artery aneurysm. She was admitted to the Intensive Care Unit and was seen by neurosurgery. She was seen by Dr. Emilie Yeung. She underwent intra-arterial endovascular embolization of left ICA aneurysm using pipeline flow diverting embolization device. Postoperatively she was in Intensive Care Unit. She was started on aspirin and Plavix. She suffered from right groin hemorrhage. Vascular surgery was consulted. She had right groin hematoma and acute blood loss anemia requiring blood transfusion. She was taken for surgery by vascular surgery and she underwent arteriotomy common femoral artery, patch angioplasty with direct repair of right common femoral artery. She was closely watched in Intensive Care Unit. She was on Nimotop for cerebrovascular spasms. Wound care was monitored by vascular surgery. She was noted to have increased drainage from the right groin surgical site. She was taken for surgery again on  and she was noted to have wound infection. She underwent  wound debridement with wound VAC placement. She did suffer from blood loss anemia and required blood transfusion again. Her cerebrovascular vasospasms had been controlled. She is also on Vimpat and Keppra. She was on Decadron and currently will be on Decadron tapering course. Wound cultures grew Escherichia coli along with anaerobic organisms including Peptoniphilus harei and Prevotella bivia. She was seen by infectious disease consultation and she has been recommended 4 weeks of antibiotic therapy with IV ceftriaxone and oral Flagyl as patient does have endovascular prosthetic device in the right groin. Wound is stable. Wound VAC is present currently.       HOSPITAL COURSE:     [facility] - March 22, left paraopthalmic artery aneurysm s/p endovascular embolization.   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      S/P vasospasm- completed nimotop     Aspirin / Plavix     Decadron tapered     GI prophylaxis - Protonix     Seizure - Vimpat / Keppra     HTN - SBP goal 120-180, s/p vasospams - Patient's SBP goal is 120-180 per Neurosurgery.  April 12 - SBP was averaging 140's and ranged 120's to 180's recently at [facility].  She was on Hydralazine 25 mg q  8 hours scheduled and Hydralazine 10 mg IV q 4 hours prn SBP >180 or DBP > 100 with last dose 4-11-18 at 07:35 there.  At discharge she was restarted on Lasix 20 mg daily, Metoprolol 100 mg daily and Doxazosin 2 mg HS , which she was not on there.  SBP was 168 last PM and 118 this AM.  Will continue Hydralazine scheduled and po prn and Lasix, but hold additional Metoprolol and Doxazosin for now to avoid excessive BP reduction s/p aneurysm stent and s/p vasospasm. She completed Nimotop April 11.   April 13 -  - resume Metoprolol succinate at lower dose of 25 mg daily  April 16 - BP elevated last PM SBP 180s -  given hydralazine prn, increased Metoprolol to 50 mg daily; may titrate up on hydralazine, follow pattern.  April 17 - She refused hydralazine night before last. Metoprolol increased yesterday. - yesterday. Hydralazine held last PM and this afternoon again as . Continue to follow pattern.   April 18- continue scheduled BP medication reduced metoprolol to 25 mg XL daily.   Variable BP pattern.  Son notes cognition is worse when BP lower. Son who is physician reports Neurosurgery felt she could be susceptiable to vasospasm for some time.    She is on much less BP meds compared to what is reported at home.  Hydralazine 25 mg has been given only once each day last three days.  Metoprolol held today and decreased to 25 mg daily starting tomorrow.   Have also decreased Hydralazine from 25 mg to 10 mg q 8 hours, hold for SBP< 120.   On Lasix 20 mg daily.  Will need established routine BP med regimen prior to discharge.  April 19 - blood pressure pattern with less variation today  April 20 -  last pm and hydralazine held. BMP okay today.  Will discontinue Metoprolol and Lasix for now and follow pattern.   April 23 - BP in target range with SBP in 140's-150's recently - on Hydralazine 10 mg q 8 hours with also order for Hydralazien 10 mg q 6 hours prn SBP > 180. Reviewed may need to start back on lower dose Metoprolol or on Lasix when home depending on how BP pattern does over time.      Right Groin hematoma, right groin wound infection s/p wound vac placement.  Right Groin hematoma s/p evacuation  -Exploration of right groin hematoma, direct repair of right common femoral artery, right SFA endarterectomy and patch angioplasty  March 27  Right groin wound debridement and wound VAC placement April 4  right groin wound infection  - Rocephin and Metronidazole x 4 weeks - stop date May 9  S/p repair femoral artery w/ vein patch and sartorius patch on April 7 April 20 - wound inspected with clean base  but some oozing of blood, needs to continue ASA and Plavix with pipeline device for cerebral aneurysm     ID - Rocephin / Metronidazole x 4 weeks - Clarified with [facility] pharmacy - Ceftriazone started 4-7 and Metronidazole started 4-11 - will do stop date May 9.  April 16 and 23  - weekly labs reviewed. Results to ID. F/u ID on April 25.      DVT prophylaxis - SCDs           TEAM CONF - April 13 - BALANCE AND PROPRIOCEPTIVE DEFICITS, SOMETIMES RUN INTO OBJECTS TO RIGHT, CORRECT WITH CUING. TRANSFERS CTG. GAIT 160 FEET RW CTG/MIN ASSIST. WOUND VAC RIGHT GROIN. BATH MIN. LBD MOD, UBD MIN ASSIST. COGNITION - SLOW PROCESSING AND MILD DIFFICULTY WITH EXECUTIVE FUNCTION. CONTINENT BOWEL AND BLADDER.  ELOS- ONE WEEK        April 18 - In PT, transfers SBA. Gait 300 feet RW SBA/CTG.    With Speech therapy, does better with visual memory compared to verbal memory.    With OT, UBD SBA.     BNE April 19 -   BNE (Active)  Att'n. - Mildly Imp.  Exec. Fx. - Mod. Imp.  Rsng/Jgmnt - Mildly Imp.  Arith - WNL  Visuospatial Skills - Mildly Imp.  Visual Mem. - Mildly Imp.  Verbal Mem. - Severely Imp.  Emot - Pt denied emotional distress     April 19 - in physical therapy, transfers contact-guard- stand by assist.  Gait 340 feet at times minimal assist rolling walker.  Still decreased attention to the right side.  12 stairs with contact-guard assist.  In speech therapy,Pt continues with overall moderate cognitive deficits, notably in the areas of memory, reasoning, and thought organization. Pt also with mild expressive language difficulties, verbally and in writing. She presents with paraphasias at times and has difficulty with spelling out single words when writing.  In occupational therapy, tub and toilet transfers and lower body dressings assist.     TEAM CONF - April 20 - YESTERDAY AFTERNOON BACK TO CTG ASSIST WITH GAIT WITH RW AND STAIRS WITH BETTER ATTENTION TO RIGHT. YESTERDAY WITH OT ADLS SBA. FURTHER FAMILY TEACHING  TODAY. WITH SPEECH THERAPY, VARY WITH FATIGUE. VERBAL MEMORY POOR, VISUAL MEMORY MILD IMPAIRMENT.VARIABLE PERFORMANCE WITH THUGHT ORGANIZATION. PARAPHRASIA WHEN TIRED. BNE AS NOTED ABOVE. DOES BETTER WHEN MORE ACTIVE. HTN- HELD HYDRALAZINE LAST PM. BMP OKAY. WILL DISCONTINUE METOPROLOL  AND LASIX FOR NOW AND FOLLOW.   ELOS- Monday April 23 - BP pattern better. Inflammatory markers - improved CRP. Home today.  >30 on discharge.     ---------------------------------------------------------------------------------------------------------       [facility] to follow at home for NSG (IV antibiotic, wound vac care), PT, OT, ST.  Stop date Ceftriaxone and Metronidazole May 9  Weekly CBC/CRP/ESR/CMP faxed to   [phone] - Infectious Disease.   Follow up within 1 week - April 25 -  Dr. Pearl Jasmine     Wound VAC right groin.  F/u Dr Shelley Boo - Vascular Surgery - May 11.      F/u Oksana Huynh - [facility] - May 17     F/u Dr Stanislaw Delaney -  - May 1                   Name Relationship Specialty Phone Fax Address Order                         Stanislaw Delaney MD    Internal Medicine [phone] [address]  [address]  [facility] [address]        Next Steps: Go on 5/1/2018       Instructions: @ 2:00p.mRosio Jasmine MD    Infectious Diseases [phone] [facility]  [address]  [address]  [facility] [facility] [address]        Next Steps: Go on 4/25/2018       Instructions: @ 9:45a.m.        [facility] CARE [facility] [phone] [address]  [facility] [address]        Next Steps: Follow up       Instructions: You will be receiving home PT, OT, ST, NSG. They will contact you to schedule in home visits.        [facility]    Respiratory Therapy, Wound Care, DME Services [phone] [address]  [place] [address]        Next Steps: Follow up       Instructions: [employer] is your supplier of the wound vac.        Weekly CBC/CRP/ESR/CMP faxed to [phone]-Infectious Disease.                   Next Steps: Follow up       Oksana WILLOUGHBY-[facility]                   Next Steps: Follow up on 5/17/2018       Liang Dumont-Vascular Surgery                   Next Steps: Follow up on 5/11/2018                        Herlinda Ta   Home Medication Instructions VAUGHN:[id number]     Printed on:04/23/18 6093   Medication Information                                       aspirin 325 MG tablet  Take 1 tablet by mouth Daily.                   cefTRIAXone (ROCEPHIN) 40 MG/ML IVPB  Infuse 50 mL into a venous catheter Daily for 16 doses.                   cetirizine (zyrTEC) 10 MG tablet  Take 0.5 tablets by mouth Daily.                   cholecalciferol (VITAMIN D3) 1000 units tablet  Take 1,000 Units by mouth Daily.                   clopidogrel (PLAVIX) 75 MG tablet  Take 1 tablet by mouth Daily.                   escitalopram (LEXAPRO) 20 MG tablet  Take 20 mg by mouth Daily.                   folic acid (FOLVITE) 1 MG tablet  Take 1 mg by mouth Daily.                   hydrALAZINE (APRESOLINE) 10 MG tablet  Take 1 tablet by mouth Every 6 (Six) Hours As Needed (SBP >=180 or DBP >=100).                   hydrALAZINE (APRESOLINE) 10 MG tablet  Take 1 tablet by mouth Every 8 (Eight) Hours.                   lacosamide (VIMPAT) 100 MG tablet tablet  Take 1 tablet by mouth Every 12 (Twelve) Hours.                   levETIRAcetam (KEPPRA) 1000 MG tablet  Take 1 tablet by mouth 2 (Two) Times a Day.                   metroNIDAZOLE (FLAGYL) 500 MG tablet  Take 1 tablet by mouth Every 8 (Eight) Hours for 49 doses.                   montelukast (SINGULAIR) 10 MG tablet  Take 10 mg by mouth Every Night.                   multivitamin (THERAGRAN) tablet tablet  Take 1 tablet by mouth Daily.                    pantoprazole (PROTONIX) 40 MG EC tablet  Take 1 tablet by mouth Daily.                   vitamin B-6 (PYRIDOXINE) 50 MG tablet  Take 100 mg by mouth Daily.                   vitamin C (ASCORBIC ACID) 500 MG tablet  Take 500 mg by mouth Daily.                           Results from last 7 days  Lab Units 04/23/18  0540   WBC 10*3/mm3 3.77*   HEMOGLOBIN g/dL 8.5*   HEMATOCRIT % 26.0*   PLATELETS 10*3/mm3 227            Results from last 7 days  Lab Units 04/23/18  0540 04/20/18  0438   SODIUM mmol/L 140 137   POTASSIUM mmol/L 3.5 3.4*   CHLORIDE mmol/L 107 105   CO2 mmol/L 21.9* 22.6   BUN mg/dL 9 14   CREATININE mg/dL 0.82 0.89   CALCIUM mg/dL 8.5* 8.1*   BILIRUBIN mg/dL 0.3  --    ALK PHOS U/L 70  --    ALT (SGPT) U/L 12  --    AST (SGOT) U/L 19  --    GLUCOSE mg/dL 86 87         Results from last 7 days  Lab Units 04/23/18  0540   CRP mg/dL 1.89*         Results from last 7 days  Lab Units 04/23/18  0540   SED RATE mm/hr 44*

## 2018-04-23 NOTE — PROGRESS NOTES
SECTION GG      Mobility Performance Discharge:   Roll Left and Right: Patient completed the activities by him/herself with no  assistance from a helper.   Sit to Lying: Patient completed the activities by him/herself with no  assistance from a helper.   Lying to Sitting on Side of Bed: Patient completed the activities by  him/herself with no assistance from a helper.   Sit to Stand: [place] provides verbal cues or touching/steadying assistance as  patient completes activity.   Chair/Bed to Chair Transfer: [place] provides verbal cues or touching/steadying  assistance as patient completes activity.   Car Transfer: [place] provides verbal cues or touching/steadying assistance as  patient completes activity.    Patient Walks:  Yes   Walk 10 Feet: [place] provides verbal cues or touching/steadying assistance as  patient completes activity.   Walk 50 Feet With 2 Turns: [place] provides verbal cues or touching/steadying  assistance as patient completes activity.   Walk 150 Feet: [place] provides verbal cues or touching/steadying assistance as  patient completes activity.   Walking 10 Feet on Uneven Surfaces: [place] provides verbal cues or  touching/steadying assistance as patient completes activity.   1 Step Over Curb or Up/Down Stair: [place] provides verbal cues or  touching/steadying assistance as patient completes activity.   4 Steps Up and Down, With/Without Rail: [place] provides verbal cues or  touching/steadying assistance as patient completes activity.   12 Steps Up and Down, With/Without Rail: [place] provides verbal cues or  touching/steadying assistance as patient completes activity.   Picking up an Object: [place] provides verbal cues or touching/steadying  assistance as patient completes activity.     Uses Wheelchair/Scooter: No    Signed by: Kera Cabrera PTA

## 2018-04-23 NOTE — PROGRESS NOTES
SECTION GG      Self Care Performance Discharge:   Oral Hygiene: [place] sets up or cleans up; patient completes activity. [place]  assists only prior to or following the activity.   Toileting Hygiene: : [place] sets up or cleans up; patient completes activity.  [place] assists only prior to or following the activity.   Shower/Bathe Self: [place] sets up or cleans up; patient completes activity.  [place] assists only prior to or following the activity.   Upper Body Dressing: [place] sets up or cleans up; patient completes activity.  [place] assists only prior to or following the activity.   Lower Body Dressing: [place] sets up or cleans up; patient completes activity.  [place] assists only prior to or following the activity.   Putting On/Taking Off Footwear: [place] sets up or cleans up; patient completes  activity. [place] assists only prior to or following the activity.    Mobility Toilet Transfer Discharge: [place] provides verbal cues or  touching/steadying assistance as patient completes activity.    Signed by: Margo Jacob OTR/YUSEF

## 2018-04-23 NOTE — PROGRESS NOTES
SECTION GG    Eating Performance Discharge: [place] sets up or cleans up; patient completes  activity. [place] assists only prior to or following the activity.    Signed by: Kiran Cardoso RN

## 2018-04-23 NOTE — PROGRESS NOTES
Patient to d/c home today, 4/23, with  and daughter who is here from [place] to stay a week or so to assist. [facility] will be supplying home wound vac and will deliver vac to patient's room today before d/c so hospital wound care nurse can apply vac. Discussed with Melissa, wound care nurse. Nursing to administer IV antibiotic this afternoon before d/c. [facility] to follow at home for NSG (IV antibiotic, wound vac care), PT, OT, ST. PT and OT completed family teaching this morning with patient's daughter and .

## 2018-04-23 NOTE — THERAPY DISCHARGE NOTE
Inpatient Rehabilitation - IRF Physical Therapy Discharge Summary  [facility]       Patient Name: Herlinda Ta  : 1940  MRN: [id number]    Today's Date: 2018                 Admit Date: 2018      PT Recommendation and Plan    Visit Dx:    ICD-10-CM ICD-9-CM   1. Subarachnoid hemorrhage I60.9 430                 PT Charges     Row Name 18 1422             Time Calculation    Start Time 0930  -LB      Stop Time 1000  -LB      Time Calculation (min) 30 min  -LB        User Key  (r) = Recorded By, (t) = Taken By, (c) = Cosigned By    Initials Name Provider Type    LB Kera Cabrera, PTA Physical Therapy Assistant                  PT IRF GOALS     Row Name 18 1400 18 1600 18 0900       Bed Mobility Goal 1 (PT-IRF)    Activity/Assistive Device (Bed Mobility Goal 1, PT-IRF) bed mobility activities, all  -LB bed mobility activities, all  -LB bed mobility activities, all  -EE    [place] Level (Bed Mobility Goal 1, PT-IRF) independent  -LB independent  -LB independent  -EE    Time Frame (Bed Mobility Goal 1, PT-IRF)  -- 1 day  -LB long term goal (LTG);2 weeks  -EE    Progress/Outcomes (Bed Mobility Goal 1, PT-IRF) goal met   modified indep  -LB goal ongoing  -LB goal ongoing  -EE       Transfer Goal 1 (PT-IRF)    Activity/Assistive Device (Transfer Goal 1, PT-IRF) sit-to-stand/stand-to-sit;bed-to-chair/chair-to-bed  -LB sit-to-stand/stand-to-sit;bed-to-chair/chair-to-bed   w AAD  -LB sit-to-stand/stand-to-sit;bed-to-chair/chair-to-bed   with A.A.D.  -EE    [place] Level (Transfer Goal 1, PT-IRF) conditional independence  -LB conditional independence  -LB conditional independence  -EE    Time Frame (Transfer Goal 1, PT-IRF)  -- 1 day  -LB long term goal (LTG);2 weeks  -EE    Progress/Outcomes (Transfer Goal 1, PT-IRF) goal not met;unable to make needed progress  -LB goal ongoing  -LB goal ongoing  -EE       Transfer Goal 2 (PT-IRF)    Activity/Assistive Device  (Transfer Goal 2, PT-IRF) car transfer  -LB car transfer  -LB car transfer  -EE    [place] Level (Transfer Goal 2, PT-IRF) supervision required  -LB supervision required  -LB supervision required  -EE    Time Frame (Transfer Goal 2, PT-IRF)  -- 1 day  -LB long term goal (LTG);2 weeks  -EE    Progress/Outcomes (Transfer Goal 2, PT-IRF) goal met  -LB goal ongoing  -LB goal ongoing  -EE       Gait/Walking Locomotion Goal 1 (PT-IRF)    Activity/Assistive Device (Gait/Walking Locomotion Goal 1, PT-IRF) gait (walking locomotion)  -LB gait (walking locomotion)  -LB gait (walking locomotion)   with A.A.D.  -EE    Gait/Walking Locomotion Distance Goal 1 (PT-IRF) 160  -  -  -EE    [place] Level (Gait/Walking Locomotion Goal 1, PT-IRF) supervision required  -LB supervision required  -LB supervision required  -EE    Time Frame (Gait/Walking Locomotion Goal 1, PT-IRF)  -- 1 day  -LB long term goal (LTG);2 weeks  -EE    Progress/Outcomes (Gait/Walking Locomotion Goal 1, PT-IRF) goal not met;unable to make needed progress  -LB goal ongoing  -LB goal ongoing  -EE       Stairs Goal 1 (PT-IRF)    Activity/Assistive Device (Stairs Goal 1, PT-IRF) ascending stairs;descending stairs;using handrail, left;using handrail, right  -LB ascending stairs;descending stairs;using handrail, left;using handrail, right  -LB ascending stairs;descending stairs;using handrail, left;using handrail, right  -EE    Number of Stairs (Stairs Goal 1, PT-IRF) 12  -LB 12  -LB 12  -EE    [place] Level (Stairs Goal 1, PT-IRF) supervision required  -LB supervision required  -LB supervision required  -EE    Time Frame (Stairs Goal 1, PT-IRF)  -- 1 day  -LB long term goal (LTG);2 weeks  -EE    Progress/Outcomes (Stairs Goal 1, PT-IRF) goal not met;unable to make needed progress  -LB goal ongoing  -LB goal ongoing  -EE      User Key  (r) = Recorded By, (t) = Taken By, (c) = Cosigned By    Initials Name Provider Type    KENTON Cabrera,  PTA Physical Therapy Assistant    OFELIA Bernstein, PT Physical Therapist          Therapy Charges for Today     Code Description Service Date Service Provider Modifiers Qty    [id number] HC PT THER PROC EA 15 MIN 4/23/2018 Kera Cabrera, HUMA GP 2          PT Discharge Summary  Outcomes Achieved: Patient able to partially acheive established goals  Discharge Destination: Home with assist, Home with home health      Kera Cabrera, HUMA   4/23/2018

## 2018-04-23 NOTE — PLAN OF CARE
Problem: Patient Care Overview  Goal: Plan of Care Review  Outcome: Ongoing (interventions implemented as appropriate)   04/23/18 1216   Patient Care Overview   IRF Plan of Care Review progress ongoing, continue   Progress, Functional Goals demonstrating adequate progress   Coping/Psychosocial   Plan of Care Reviewed With patient   OTHER   Outcome Summary home today with home health       Problem: Fall Risk (Adult)  Goal: Absence of Fall  Outcome: Ongoing (interventions implemented as appropriate)   04/23/18 1216   Fall Risk (Adult)   Absence of Fall making progress toward outcome

## 2018-04-23 NOTE — PROGRESS NOTES
Inpatient Rehabilitation Functional Measures Assessment    Functional Measures  BERONICA Eating:  [facility] Grooming: [facility] Bathing:  [facility] Upper Body Dressing:  [facility] Lower Body Dressing:  [facility] Toileting:  [facility] Bladder Management  Level of Assistance:  [place]  Frequency/Number of Accidents this Shift:  [facility] Bowel Management  Level of Assistance: [place]  Frequency/Number of Accidents this Shift: [facility] Bed/Chair/Wheelchair Transfer:  [facility] Toilet Transfer:  [facility] Tub/Shower Transfer:  [place]    Previously Documented Mode of Locomotion at Discharge: Field  BERONICA Expected Mode of Locomotion at Discharge: [facility] Walk/Wheelchair:  [facility] Stairs:  [facility] Comprehension:  Auditory comprehension is the usual mode. Comprehension  Score = 6, Modified [place].  Patient comprehends complex/abstract  information in their primary language with only mild difficulty.  BERONICA Expression:  Vocal expression is the usual mode. Expression Score = 6,  Modified Independent.  Patient expresses complex/abstract information in their  primary language with only mild difficulty with tasks.  BERONICA Social Interaction:  Social Interaction Score = 6, Modified Independent.  Patient is modified independent for social interaction, requiring: Requires  additional time.  [place]ON[place] Problem Solving:  Problem Solving Score = 7, Independent.  Patient makes  appropriate decisions in order to solve complex problems.  Patient is completely  independent for problem solving.  There are no activity limitations.  BERONICA Memory:  Memory Score = 6, Modified [place].  Patient is modified  independent for memory, having only mild difficulty and using self-initiated or  environmental cues to remember.    Therapy Mode Minutes  Occupational Therapy: Branch  Physical Therapy: Branch  Speech Language Pathology:  Branch    Signed by: Kiran Cardoso RN

## 2018-04-23 NOTE — DISCHARGE INSTRUCTIONS
[facility] to follow at home for NSG (IV antibiotic, wound vac care), PT, OT, ST.  Stop date Ceftriaxone and Metronidazole May 9  Weekly CBC/CRP/ESR/CMP faxed to   [phone] - Infectious Disease.   Follow up within 1 week. Dr. Pearl Jasmine    Wound VAC right groin.

## 2018-04-23 NOTE — PROGRESS NOTES
Inpatient Rehabilitation Plan of Care Note    Plan of Care  Care Plan Reviewed - No updates at this time.    Safety    Performed Intervention(s)  Safety rounds; call light/items within easy reach      Body Function Structure    Performed Intervention(s)  Dressing changes/Wound Care as order  Turning      Psychosocial    Performed Intervention(s)  Encourage to verbalize concerns    Signed by: Kiran Cardoso RN

## 2018-04-23 NOTE — PROGRESS NOTES
Occupational Therapy: Individual: 30 minutes.    Physical Therapy: Branch    Speech Language Pathology:  Branch    Signed by: ANNIE Silva/YUSEF

## 2018-04-23 NOTE — PLAN OF CARE
Problem: Patient Care Overview  Goal: Plan of Care Review  Outcome: Ongoing (interventions implemented as appropriate)   04/22/18 1645 04/22/18 2000 04/23/18 0023   Patient Care Overview   IRF Plan of Care Review progress ongoing, continue --  --    Progress, Functional Goals demonstrating adequate progress --  --    Coping/Psychosocial   Plan of Care Reviewed With --  patient --    OTHER   Outcome Summary --  --  Pt to be d/c'd 4/23. Wound vac intact in right groin to be changed 4/23. Picc line intact. dressing to be changed 4/26/18. Complains of incisional pain, medicated-see mar.        Problem: Skin Injury Risk (Adult)  Goal: Skin Health and Integrity  Outcome: Ongoing (interventions implemented as appropriate)   04/22/18 1645   Skin Injury Risk (Adult)   Skin Health and Integrity making progress toward outcome       Problem: Fall Risk (Adult)  Goal: Absence of Fall  Outcome: Ongoing (interventions implemented as appropriate)   04/22/18 1645   Fall Risk (Adult)   Absence of Fall making progress toward outcome       Problem: Mobility, Physical Impaired (Adult)  Goal: Enhanced Mobility Skills  Outcome: Ongoing (interventions implemented as appropriate)   04/22/18 1645   Mobility, Physical Impaired (Adult)   Enhanced Mobility Skills making progress toward outcome       Problem: Stroke (IRF) (Adult)  Goal: Promote Optimal Functional Schleicher  Outcome: Ongoing (interventions implemented as appropriate)   04/21/18 2236   Stroke (IRF) (Adult)   Promote Optimal Functional Sch[place]icher demonstrating adequate progress

## 2018-04-23 NOTE — PROGRESS NOTES
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   Right Groin hematoma s/p evacuation  -Exploration of right groin hematoma, direct repair of right common femoral artery, right SFA endarterectomy and patch angioplasty  March 27  Right groin wound debridement and wound VAC placement April 4  right groin wound infection  - Rocephin and Metronidazole x 4 weeks - stop date May 9  S/p repair femoral artery w/ vein patch and sartorius patch on April 7  HTN        Subjective     History of Present Illness    Subjective   She denies any headache.  No discomfort at the right groin except when she has the dressing change.  Continues with improved strength proximally in the right lower extremity.  Tolerating therapies. Ready for home.     History taken from: patient    Objective     Vital Signs  Temp:  [98 °F (36.7 °C)-98.3 °F (36.8 °C)] 98.2 °F (36.8 °C)  Heart Rate:  [78-88] 78  Resp:  [16-18] 16  BP: (118-158)/(52-70) 140/70    Objective  MENTAL STATUS-awake, alert  HEENT - NCAT   LUNGS -CTA  HEART -RRR  ABD - NABS, soft, NT  EXT -   RIGHT GROIN.  Wound VAC. Good palpable right DP pulse.   NEURO - good strength bilaterally.       Results Review:     I reviewed the patient's new clinical results.    Results from last 7 days  Lab Units 04/23/18  0540   WBC 10*3/mm3 3.77*   HEMOGLOBIN g/dL 8.5*   HEMATOCRIT % 26.0*   PLATELETS 10*3/mm3 227         Results from last 7 days  Lab Units 04/23/18  0540 04/20/18  0438   SODIUM mmol/L 140 137   POTASSIUM mmol/L 3.5 3.4*   CHLORIDE mmol/L 107 105   CO2 mmol/L 21.9* 22.6   BUN mg/dL 9 14   CREATININE mg/dL 0.82 0.89   CALCIUM mg/dL 8.5* 8.1*   BILIRUBIN mg/dL 0.3  --    ALK PHOS U/L 70  --    ALT (SGPT) U/L 12   --    AST (SGOT) U/L 19  --    GLUCOSE mg/dL 86 87       Results from last 7 days  Lab Units 04/23/18  0540   CRP mg/dL 1.89*       Results from last 7 days  Lab Units 04/23/18  0540   SED RATE mm/hr 44*         Medication Review: DONE  Scheduled Meds:    aspirin 325 mg Oral Daily   ceftriaxone 2 g Intravenous Q24H   cetirizine 5 mg Oral Daily   cholecalciferol 1,000 Units Oral Daily   clopidogrel 75 mg Oral Daily   escitalopram 20 mg Oral Daily   folic acid 1 mg Oral Daily   hydrALAZINE 10 mg Oral Q8H   lacosamide 100 mg Oral Q12H   levETIRAcetam 1,000 mg Oral BID   metroNIDAZOLE 500 mg Oral Q8H   montelukast 10 mg Oral Daily   multivitamin 1 tablet Oral Daily   pantoprazole 40 mg Oral Q AM   vitamin B-6 50 mg Oral Daily   vitamin C 500 mg Oral Daily     Continuous Infusions:   PRN Meds:.hydrALAZINE      Assessment/Plan     Active Problems:    Subarachnoid hemorrhage      Assessment & Plan   SAH - March 22, left paraopthalmic artery aneurysm s/p endovascular embolization.   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     S/P vasospasm- completed nimotop    Aspirin / Plavix    Decadron tapered    GI prophylaxis - Protonix    Seizure - Vimpat / Keppra    HTN - SBP goal 120-180, s/p vasospams - Patient's SBP goal is 120-180 per Neurosurgery.  April 12 - SBP was averaging 140's and ranged 120's to 180's recently at [facility].  She was on Hydralazine 25 mg q  8 hours scheduled and Hydralazine 10 mg IV q 4 hours prn SBP >180 or DBP > 100 with last dose 4-11-18 at 07:35 there.  At discharge she was restarted on Lasix 20 mg daily, Metoprolol 100 mg daily and Doxazosin 2 mg HS , which she was not on there.  SBP was 168 last PM and 118 this AM.  Will continue Hydralazine scheduled and po prn and Lasix, but hold additional Metoprolol and Doxazosin for now to avoid  excessive BP reduction s/p aneurysm stent and s/p vasospasm. She completed Nimotop April 11.   April 13 -  - resume Metoprolol succinate at lower dose of 25 mg daily  April 16 - BP elevated last PM SBP 180s - given hydralazine prn, increased Metoprolol to 50 mg daily; may titrate up on hydralazine, follow pattern.  April 17 - She refused hydralazine night before last. Metoprolol increased yesterday. - yesterday. Hydralazine held last PM and this afternoon again as . Continue to follow pattern.   April 18- continue scheduled BP medication reduced metoprolol to 25 mg XL daily.   Variable BP pattern.  Son notes cognition is worse when BP lower. Son who is physician reports Neurosurgery felt she could be susceptiable to vasospasm for some time.    She is on much less BP meds compared to what is reported at home.  Hydralazine 25 mg has been given only once each day last three days.  Metoprolol held today and decreased to 25 mg daily starting tomorrow.   Have also decreased Hydralazine from 25 mg to 10 mg q 8 hours, hold for SBP< 120.   On Lasix 20 mg daily.  Will need established routine BP med regimen prior to discharge.  April 19 - blood pressure pattern with less variation today  April 20 -  last pm and hydralazine held. BMP okay today.  Will discontinue Metoprolol and Lasix for now and follow pattern.   April 23 - BP in target range with SBP in 140's-150's recently - on Hydralazine 10 mg q 8 hours with also order for Hydralazien 10 mg q 6 hours prn SBP > 180. Reviewed may need to start back on lower dose Metoprolol or on Lasix when home depending on how BP pattern does over time.     Right Groin hematoma, right groin wound infection s/p wound vac placement.  Right Groin hematoma s/p evacuation  -Exploration of right groin hematoma, direct repair of right common femoral artery, right SFA endarterectomy and patch angioplasty  March 27  Right groin wound debridement and wound VAC  placement April 4  right groin wound infection  - Rocephin and Metronidazole x 4 weeks - stop date May 9  S/p repair femoral artery w/ vein patch and sartorius patch on April 7 April 20 - wound inspected with clean base but some oozing of blood, needs to continue ASA and Plavix with pipeline device for cerebral aneurysm     ID - Rocephin / Metronidazole x 4 weeks - Clarified with [facility] pharmacy - Ceftriazone started 4-7 and Metronidazole started 4-11 - will do stop date May 9.  April 16 and 23  - weekly labs reviewed. Results to ID. F/u ID on April 25.     DVT prophylaxis - SCDs         TEAM CONF - April 13 - BALANCE AND PROPRIOCEPTIVE DEFICITS, SOMETIMES RUN INTO OBJECTS TO RIGHT, CORRECT WITH CUING. TRANSFERS CTG. GAIT 160 FEET RW CTG/MIN ASSIST. WOUND VAC RIGHT GROIN. BATH MIN. LBD MOD, UBD MIN ASSIST. COGNITION - SLOW PROCESSING AND MILD DIFFICULTY WITH EXECUTIVE FUNCTION. CONTINENT BOWEL AND BLADDER.  ELOS- ONE WEEK      April 18 - In PT, transfers SBA. Gait 300 feet RW SBA/CTG.    With Speech therapy, does better with visual memory compared to verbal memory.    With OT, UBD SBA.    BNE April 19 -   BNE (Active)  Att'n. - Mildly Imp.  Exec. Fx. - Mod. Imp.  Rsng/Jgmnt - Mildly Imp.  Arith - WNL  Visuospatial Skills - Mildly Imp.  Visual Mem. - Mildly Imp.  Verbal Mem. - Severely Imp.  Emot - Pt denied emotional distress    April 19 - in physical therapy, transfers contact-guard- stand by assist.  Gait 340 feet at times minimal assist rolling walker.  Still decreased attention to the right side.  12 stairs with contact-guard assist.  In speech therapy,Pt continues with overall moderate cognitive deficits, notably in the areas of memory, reasoning, and thought organization. Pt also with mild expressive language difficulties, verbally and in writing. She presents with paraphasias at times and has difficulty with spelling out single words when writing.  In occupational therapy, tub and toilet transfers  and lower body dressings assist.    TEAM CONF - April 20 - YESTERDAY AFTERNOON BACK TO CTG ASSIST WITH GAIT WITH RW AND STAIRS WITH BETTER ATTENTION TO RIGHT. YESTERDAY WITH OT ADLS SBA. FURTHER FAMILY TEACHING TODAY. WITH SPEECH THERAPY, VARY WITH FATIGUE. VERBAL MEMORY POOR, VISUAL MEMORY MILD IMPAIRMENT.VARIABLE PERFORMANCE WITH THUGHT ORGANIZATION. PARAPHRASIA WHEN TIRED. BNE AS NOTED ABOVE. DOES BETTER WHEN MORE ACTIVE. HTN- HELD HYDRALAZINE LAST PM. BMP OKAY. WILL DISCONTINUE METOPROLOL  AND LASIX FOR NOW AND FOLLOW.   ELOS- Monday April 23 - BP pattern better. Inflammatory markers - improved CRP. Home today.  >30 on discharge.    ---------------------------------------------------------------------------------------------------------      [facility] to follow at home for NSG (IV antibiotic, wound vac care), PT, OT, ST.  Stop date Ceftriaxone and Metronidazole May 9  Weekly CBC/CRP/ESR/CMP faxed to   [phone] - Infectious Disease.   Follow up within 1 week - April 25 -  Dr. Pearl Jasmine    Wound VAC right groin.  F/u Dr Shelley Boo - Vascular Surgery - May 11.     F/u Oksana Huynh - [facility] - May 17    F/u Dr Stanislaw Delaney -  - May 1      Name Relationship Specialty Phone Fax Address Order              Stanislaw Delaney MD   Internal Medicine [phone] [address] [place] [address] [address]  [facility] [address]     Next Steps: Go on 5/1/2018      Instructions: @ 2:00p.fco Jasmine MD   Infectious Diseases [phone] [place] [address] [facility]  [address]  [facility] [address]     Next Steps: Go on 4/25/2018      Instructions: @ 9:45a.m.       [facility] [phone] [address] [address]     Next Steps: Follow up      Instructions: You will be receiving home PT, OT, ST, NSG. They will contact you to schedule in  home visits.       [facility]   Respiratory Therapy, Wound Care, DME Services [phone] [address] [facility] [address]     Next Steps: Follow up      Instructions: Harjinder is your supplier of the wound vac.       Weekly CBC/CRP/ESR/CMP faxed to [phone]-Infectious Disease.           Next Steps: Follow up      Oksana WILLOUGHBY-[facility]           Next Steps: Follow up on 5/17/2018      Liang Dumont-Vascular Surgery           Next Steps: Follow up on 5/11/2018         Herlinda Ta   Home Medication Instructions [id number]    Printed on:04/23/18 1325   Medication Information                      aspirin 325 MG tablet  Take 1 tablet by mouth Daily.             cefTRIAXone (ROCEPHIN) 40 MG/ML IVPB  Infuse 50 mL into a venous catheter Daily for 16 doses.             cetirizine (zyrTEC) 10 MG tablet  Take 0.5 tablets by mouth Daily.             cholecalciferol (VITAMIN D3) 1000 units tablet  Take 1,000 Units by mouth Daily.             clopidogrel (PLAVIX) 75 MG tablet  Take 1 tablet by mouth Daily.             escitalopram (LEXAPRO) 20 MG tablet  Take 20 mg by mouth Daily.             folic acid (FOLVITE) 1 MG tablet  Take 1 mg by mouth Daily.             hydrALAZINE (APRESOLINE) 10 MG tablet  Take 1 tablet by mouth Every 6 (Six) Hours As Needed (SBP >=180 or DBP >=100).             hydrALAZINE (APRESOLINE) 10 MG tablet  Take 1 tablet by mouth Every 8 (Eight) Hours.             lacosamide (VIMPAT) 100 MG tablet tablet  Take 1 tablet by mouth Every 12 (Twelve) Hours.             levETIRAcetam (KEPPRA) 1000 MG tablet  Take 1 tablet by mouth 2 (Two) Times a Day.             metroNIDAZOLE (FLAGYL) 500 MG tablet  Take 1 tablet by mouth Every 8 (Eight) Hours for 49 doses.             montelukast (SINGULAIR) 10 MG tablet  Take 10 mg by mouth Every Night.             multivitamin (THERAGRAN) tablet tablet  Take 1 tablet by mouth Daily.              pantoprazole (PROTONIX) 40 MG EC tablet  Take 1 tablet by mouth Daily.             vitamin B-6 (PYRIDOXINE) 50 MG tablet  Take 100 mg by mouth Daily.             vitamin C (ASCORBIC ACID) 500 MG tablet  Take 500 mg by mouth Daily.                 Misha Prince MD  04/23/18  1:01 PM    Time:

## 2018-04-23 NOTE — NURSING NOTE
04/23/18 1440   Wound Right groin   No Date first assessed or Time first assessed found.   Present On Admission : yes  Side: Right  Location: groin   Dressing Appearance intact   Base moist;red/granulating   Red (%), Wound Tissue Color 95   Yellow (%), Wound Tissue Color 5   Periwound pink;other (see comments)  (improved and less moist from Friday)   Periwound Temperature warm   Periwound Skin Turgor soft   Edges open   Drainage Characteristics/Odor serosanguineous   Drainage Amount moderate   Care, Wound cleansed with;sterile normal saline   Dressing Care, Wound dressing changed;foam   Periwound Care, Wound dry periwound area maintained;other (see comments)  (flat barrier ring in skin creases)   NPWT (Negative Pressure Wound Therapy) R groin   No Placement Date or Time found.   Location: R groin   Therapy Setting continuous therapy   Dressing foam, green   Pressure Setting 75 mmHg   Sponges Inserted 4   Sponges Removed 4     CWOCN changed the wound vac over to [employer] NPWT for home. Discussed troubleshooting with patient and . They verbalized understanding. Plan M-W-F dressing changes.

## 2018-04-23 NOTE — PLAN OF CARE
Problem: Patient Care Overview  Goal: Plan of Care Review   04/23/18 1225   Coping/Psychosocial   Plan of Care Reviewed With patient   OTHER   Outcome Summary Pt to d/c today, family performed ADL this am with OT supervision, no safety concerns.

## 2018-04-23 NOTE — SIGNIFICANT NOTE
04/23/18 1256   SLP Discharge Summary   Anticipated Dischage Disposition unknown   Reason for Discharge discharge from this facility   Progress Toward Achieving Short/long Term Goals goals not met within established timelines   Discharge Destination home w/ OP services

## 2018-04-23 NOTE — PROGRESS NOTES
Inpatient Rehabilitation Functional Measures Assessment and Plan of Care    Plan of Care  Updated Problems/Interventions  Field    Functional Measures  BERONICA Eating:  Branch  BEORNICA Grooming: [facility] Bathing:  [facility] Upper Body Dressing:  Branch  [facility] Lower Body Dressing:  [facility] Toileting:  [facility] Bladder Management  Level of Assistance:  [place]  Frequency/Number of Accidents this Shift:  [facility] Bowel Management  Level of Assistance: [place]  Frequency/Number of Accidents this Shift: Branch    [facility] Bed/Chair/Wheelchair Transfer:  Bed/chair/wheelchair Transfer Score = 5.  Patient is supervision/set-up for transferring to and from the  bed/chair/wheelchair, requiring: Patient requires the following assistive  device(s): Bed rails.  BERONICA Toilet Transfer:  [facility] Tub/Shower Transfer:  [place]    Previously Documented Mode of Locomotion at Discharge: Field  BERONICA Expected Mode of Locomotion at Discharge: [facility] Walk/Wheelchair:  WHEELCHAIR OBSERVATION   Activity was not observed.    WALK OBSERVATION   Walk Distance Scale = 3.  Distance walked is greater than 150 feet. Walk Score  = 4.  Patient performs 75% or more of effort and requires minimal assistance.  Incidental help/contact guard/steadying was provided. Patient walked a distance  of  200 feet. Patient requires the following assistive device(s): Rolling  walker.  BERONICA Stairs:  Stairs Score = 4.  Incidental help/contact guard/steadying was  provided. Patient performs 75% or more of effort and requires minimal  assistance. Patient negotiated 12 stairs. Patient requires the following  assistive device(s): Handrail(s).    BERONICA Comprehension:  [place]  BERONICA Expression:  [facility] Social Interaction:  [facility] Problem Solving:  [facility] Memory:  [place]    Therapy Mode Minutes  Occupational Therapy: [place]  Physical Therapy: Individual: 30 minutes.  Speech Language Pathology:  [place]    Signed by: Kera Cabrera PTA

## 2018-04-25 ENCOUNTER — LAB REQUISITION (OUTPATIENT)
Dept: LAB | Facility: HOSPITAL | Age: 78
End: 2018-04-25

## 2018-04-25 DIAGNOSIS — Z00.00 ENCOUNTER FOR GENERAL ADULT MEDICAL EXAMINATION WITHOUT ABNORMAL FINDINGS: ICD-10-CM

## 2018-04-25 LAB
ALBUMIN SERPL-MCNC: 2.9 G/DL (ref 3.5–5.2)
ALBUMIN/GLOB SERPL: 1.2 G/DL
ALP SERPL-CCNC: 85 U/L (ref 39–117)
ALT SERPL W P-5'-P-CCNC: 14 U/L (ref 1–33)
ANION GAP SERPL CALCULATED.3IONS-SCNC: 10.3 MMOL/L
AST SERPL-CCNC: 19 U/L (ref 1–32)
BASOPHILS # BLD AUTO: 0.04 10*3/MM3 (ref 0–0.2)
BASOPHILS NFR BLD AUTO: 0.8 % (ref 0–1.5)
BILIRUB SERPL-MCNC: 0.2 MG/DL (ref 0.1–1.2)
BUN BLD-MCNC: 11 MG/DL (ref 8–23)
BUN/CREAT SERPL: 13.3 (ref 7–25)
CALCIUM SPEC-SCNC: 8.3 MG/DL (ref 8.6–10.5)
CHLORIDE SERPL-SCNC: 108 MMOL/L (ref 98–107)
CO2 SERPL-SCNC: 20.7 MMOL/L (ref 22–29)
CREAT BLD-MCNC: 0.83 MG/DL (ref 0.57–1)
DEPRECATED RDW RBC AUTO: 55.7 FL (ref 37–54)
EOSINOPHIL # BLD AUTO: 0.19 10*3/MM3 (ref 0–0.7)
EOSINOPHIL NFR BLD AUTO: 4 % (ref 0.3–6.2)
ERYTHROCYTE [DISTWIDTH] IN BLOOD BY AUTOMATED COUNT: 16.8 % (ref 11.7–13)
GFR SERPL CREATININE-BSD FRML MDRD: 67 ML/MIN/1.73
GLOBULIN UR ELPH-MCNC: 2.5 GM/DL
GLUCOSE BLD-MCNC: 82 MG/DL (ref 65–99)
HCT VFR BLD AUTO: 28.5 % (ref 35.6–45.5)
HGB BLD-MCNC: 9.2 G/DL (ref 11.9–15.5)
IMM GRANULOCYTES # BLD: 0.07 10*3/MM3 (ref 0–0.03)
IMM GRANULOCYTES NFR BLD: 1.5 % (ref 0–0.5)
LYMPHOCYTES # BLD AUTO: 2.05 10*3/MM3 (ref 0.9–4.8)
LYMPHOCYTES NFR BLD AUTO: 43.4 % (ref 19.6–45.3)
MCH RBC QN AUTO: 29.7 PG (ref 26.9–32)
MCHC RBC AUTO-ENTMCNC: 32.3 G/DL (ref 32.4–36.3)
MCV RBC AUTO: 91.9 FL (ref 80.5–98.2)
MONOCYTES # BLD AUTO: 0.37 10*3/MM3 (ref 0.2–1.2)
MONOCYTES NFR BLD AUTO: 7.8 % (ref 5–12)
NEUTROPHILS # BLD AUTO: 2.07 10*3/MM3 (ref 1.9–8.1)
NEUTROPHILS NFR BLD AUTO: 44 % (ref 42.7–76)
PLATELET # BLD AUTO: 265 10*3/MM3 (ref 140–500)
PMV BLD AUTO: 8.9 FL (ref 6–12)
POTASSIUM BLD-SCNC: 3.8 MMOL/L (ref 3.5–5.2)
PROT SERPL-MCNC: 5.4 G/DL (ref 6–8.5)
RBC # BLD AUTO: 3.1 10*6/MM3 (ref 3.9–5.2)
SODIUM BLD-SCNC: 139 MMOL/L (ref 136–145)
WBC NRBC COR # BLD: 4.72 10*3/MM3 (ref 4.5–10.7)

## 2018-04-25 PROCEDURE — 85025 COMPLETE CBC W/AUTO DIFF WBC: CPT | Performed by: INTERNAL MEDICINE

## 2018-04-25 PROCEDURE — 80053 COMPREHEN METABOLIC PANEL: CPT | Performed by: INTERNAL MEDICINE

## 2018-04-30 ENCOUNTER — LAB REQUISITION (OUTPATIENT)
Dept: LAB | Facility: HOSPITAL | Age: 78
End: 2018-04-30

## 2018-04-30 DIAGNOSIS — B95.4 OTHER STREPTOCOCCUS AS THE CAUSE OF DISEASES CLASSIFIED ELSEWHERE: ICD-10-CM

## 2018-04-30 DIAGNOSIS — B96.89 OTHER SPECIFIED BACTERIAL AGENTS AS THE CAUSE OF DISEASES CLASSIFIED ELSEWHERE: ICD-10-CM

## 2018-04-30 DIAGNOSIS — B96.20 UNSPECIFIED ESCHERICHIA COLI (E. COLI) AS THE CAUSE OF DISEASES CLASSIFIED ELSEWHERE: ICD-10-CM

## 2018-04-30 DIAGNOSIS — T81.40XA INFECTION FOLLOWING PROCEDURE: ICD-10-CM

## 2018-04-30 LAB
ALBUMIN SERPL-MCNC: 2.9 G/DL (ref 3.5–5.2)
ALBUMIN/GLOB SERPL: 1.3 G/DL
ALP SERPL-CCNC: 81 U/L (ref 39–117)
ALT SERPL W P-5'-P-CCNC: 12 U/L (ref 1–33)
ANION GAP SERPL CALCULATED.3IONS-SCNC: 13.8 MMOL/L
AST SERPL-CCNC: 21 U/L (ref 1–32)
BASOPHILS # BLD AUTO: 0.05 10*3/MM3 (ref 0–0.2)
BASOPHILS NFR BLD AUTO: 0.9 % (ref 0–1.5)
BILIRUB SERPL-MCNC: 0.3 MG/DL (ref 0.1–1.2)
BUN BLD-MCNC: 7 MG/DL (ref 8–23)
BUN/CREAT SERPL: 8.4 (ref 7–25)
CALCIUM SPEC-SCNC: 9 MG/DL (ref 8.6–10.5)
CHLORIDE SERPL-SCNC: 104 MMOL/L (ref 98–107)
CO2 SERPL-SCNC: 23.2 MMOL/L (ref 22–29)
CREAT BLD-MCNC: 0.83 MG/DL (ref 0.57–1)
CRP SERPL-MCNC: 0.92 MG/DL (ref 0–0.5)
DEPRECATED RDW RBC AUTO: 59 FL (ref 37–54)
EOSINOPHIL # BLD AUTO: 0.14 10*3/MM3 (ref 0–0.7)
EOSINOPHIL NFR BLD AUTO: 2.6 % (ref 0.3–6.2)
ERYTHROCYTE [DISTWIDTH] IN BLOOD BY AUTOMATED COUNT: 17 % (ref 11.7–13)
ERYTHROCYTE [SEDIMENTATION RATE] IN BLOOD: 37 MM/HR (ref 0–30)
GFR SERPL CREATININE-BSD FRML MDRD: 67 ML/MIN/1.73
GLOBULIN UR ELPH-MCNC: 2.2 GM/DL
GLUCOSE BLD-MCNC: 65 MG/DL (ref 65–99)
HCT VFR BLD AUTO: 31.7 % (ref 35.6–45.5)
HGB BLD-MCNC: 9.9 G/DL (ref 11.9–15.5)
IMM GRANULOCYTES # BLD: 0.06 10*3/MM3 (ref 0–0.03)
IMM GRANULOCYTES NFR BLD: 1.1 % (ref 0–0.5)
LYMPHOCYTES # BLD AUTO: 2.15 10*3/MM3 (ref 0.9–4.8)
LYMPHOCYTES NFR BLD AUTO: 39.2 % (ref 19.6–45.3)
MCH RBC QN AUTO: 29.6 PG (ref 26.9–32)
MCHC RBC AUTO-ENTMCNC: 31.2 G/DL (ref 32.4–36.3)
MCV RBC AUTO: 94.9 FL (ref 80.5–98.2)
MONOCYTES # BLD AUTO: 0.55 10*3/MM3 (ref 0.2–1.2)
MONOCYTES NFR BLD AUTO: 10 % (ref 5–12)
NEUTROPHILS # BLD AUTO: 2.53 10*3/MM3 (ref 1.9–8.1)
NEUTROPHILS NFR BLD AUTO: 46.2 % (ref 42.7–76)
PLATELET # BLD AUTO: 256 10*3/MM3 (ref 140–500)
PMV BLD AUTO: 8.9 FL (ref 6–12)
POTASSIUM BLD-SCNC: 4 MMOL/L (ref 3.5–5.2)
PROT SERPL-MCNC: 5.1 G/DL (ref 6–8.5)
RBC # BLD AUTO: 3.34 10*6/MM3 (ref 3.9–5.2)
SODIUM BLD-SCNC: 141 MMOL/L (ref 136–145)
WBC NRBC COR # BLD: 5.48 10*3/MM3 (ref 4.5–10.7)

## 2018-04-30 PROCEDURE — 86140 C-REACTIVE PROTEIN: CPT | Performed by: INTERNAL MEDICINE

## 2018-04-30 PROCEDURE — 85652 RBC SED RATE AUTOMATED: CPT | Performed by: INTERNAL MEDICINE

## 2018-04-30 PROCEDURE — 80053 COMPREHEN METABOLIC PANEL: CPT | Performed by: INTERNAL MEDICINE

## 2018-04-30 PROCEDURE — 85025 COMPLETE CBC W/AUTO DIFF WBC: CPT | Performed by: INTERNAL MEDICINE

## 2018-05-07 ENCOUNTER — LAB REQUISITION (OUTPATIENT)
Dept: LAB | Facility: HOSPITAL | Age: 78
End: 2018-05-07

## 2018-05-07 DIAGNOSIS — I97.638 POSTPROCEDURAL HEMATOMA OF A CIRCULATORY SYSTEM ORGAN OR STRUCTURE FOLLOWING OTHER CIRCULATORY SYSTEM PROCEDURE: ICD-10-CM

## 2018-05-07 DIAGNOSIS — I10 ESSENTIAL (PRIMARY) HYPERTENSION: ICD-10-CM

## 2018-05-07 DIAGNOSIS — Z79.2 LONG TERM CURRENT USE OF ANTIBIOTICS: ICD-10-CM

## 2018-05-07 DIAGNOSIS — T81.40XA INFECTION FOLLOWING PROCEDURE: ICD-10-CM

## 2018-05-07 DIAGNOSIS — B95.4 OTHER STREPTOCOCCUS AS THE CAUSE OF DISEASES CLASSIFIED ELSEWHERE: ICD-10-CM

## 2018-05-07 DIAGNOSIS — B96.20 UNSPECIFIED ESCHERICHIA COLI (E. COLI) AS THE CAUSE OF DISEASES CLASSIFIED ELSEWHERE: ICD-10-CM

## 2018-05-07 LAB
ALBUMIN SERPL-MCNC: 3.7 G/DL (ref 3.5–5.2)
ALBUMIN/GLOB SERPL: 1.4 G/DL
ALP SERPL-CCNC: 76 U/L (ref 40–129)
ALT SERPL W P-5'-P-CCNC: 13 U/L (ref 5–33)
ANION GAP SERPL CALCULATED.3IONS-SCNC: 14.6 MMOL/L
AST SERPL-CCNC: 22 U/L (ref 5–32)
BASOPHILS # BLD AUTO: 0.09 10*3/MM3 (ref 0–0.2)
BASOPHILS NFR BLD AUTO: 1.4 % (ref 0–2)
BILIRUB SERPL-MCNC: 0.3 MG/DL (ref 0.2–1.2)
BUN BLD-MCNC: 9 MG/DL (ref 8–23)
BUN/CREAT SERPL: 11.8 (ref 7–25)
CALCIUM SPEC-SCNC: 8.7 MG/DL (ref 8.8–10.5)
CHLORIDE SERPL-SCNC: 101 MMOL/L (ref 98–107)
CO2 SERPL-SCNC: 24.4 MMOL/L (ref 22–29)
CREAT BLD-MCNC: 0.76 MG/DL (ref 0.57–1)
CRP SERPL-MCNC: 0.65 MG/DL (ref 0–0.5)
DEPRECATED RDW RBC AUTO: 55.4 FL (ref 37–54)
EOSINOPHIL # BLD AUTO: 0.14 10*3/MM3 (ref 0.1–0.3)
EOSINOPHIL NFR BLD AUTO: 2.2 % (ref 0–4)
ERYTHROCYTE [DISTWIDTH] IN BLOOD BY AUTOMATED COUNT: 15.9 % (ref 11.5–14.5)
ERYTHROCYTE [SEDIMENTATION RATE] IN BLOOD: 25 MM/HR (ref 0–20)
GFR SERPL CREATININE-BSD FRML MDRD: 74 ML/MIN/1.73
GLOBULIN UR ELPH-MCNC: 2.6 GM/DL
GLUCOSE BLD-MCNC: 72 MG/DL (ref 65–99)
HCT VFR BLD AUTO: 37.4 % (ref 37–47)
HGB BLD-MCNC: 12.1 G/DL (ref 12–16)
IMM GRANULOCYTES # BLD: 0.04 10*3/MM3 (ref 0–0.03)
IMM GRANULOCYTES NFR BLD: 0.6 % (ref 0–0.5)
LYMPHOCYTES # BLD AUTO: 1.96 10*3/MM3 (ref 0.6–4.8)
LYMPHOCYTES NFR BLD AUTO: 31.4 % (ref 20–45)
MCH RBC QN AUTO: 30.4 PG (ref 27–31)
MCHC RBC AUTO-ENTMCNC: 32.4 G/DL (ref 31–37)
MCV RBC AUTO: 94 FL (ref 81–99)
MONOCYTES # BLD AUTO: 0.48 10*3/MM3 (ref 0–1)
MONOCYTES NFR BLD AUTO: 7.7 % (ref 3–8)
NEUTROPHILS # BLD AUTO: 3.53 10*3/MM3 (ref 1.5–8.3)
NEUTROPHILS NFR BLD AUTO: 56.7 % (ref 45–70)
NRBC BLD MANUAL-RTO: 0 /100 WBC (ref 0–0)
PLATELET # BLD AUTO: 330 10*3/MM3 (ref 140–500)
PMV BLD AUTO: 9.4 FL (ref 7.4–10.4)
POTASSIUM BLD-SCNC: 3.5 MMOL/L (ref 3.5–5.2)
PROT SERPL-MCNC: 6.3 G/DL (ref 6–8.5)
RBC # BLD AUTO: 3.98 10*6/MM3 (ref 4.2–5.4)
SODIUM BLD-SCNC: 140 MMOL/L (ref 136–145)
WBC NRBC COR # BLD: 6.24 10*3/MM3 (ref 4.8–10.8)

## 2018-05-07 PROCEDURE — 86140 C-REACTIVE PROTEIN: CPT | Performed by: INTERNAL MEDICINE

## 2018-05-07 PROCEDURE — 80053 COMPREHEN METABOLIC PANEL: CPT | Performed by: INTERNAL MEDICINE

## 2018-05-07 PROCEDURE — 85652 RBC SED RATE AUTOMATED: CPT | Performed by: INTERNAL MEDICINE

## 2018-05-07 PROCEDURE — 85025 COMPLETE CBC W/AUTO DIFF WBC: CPT | Performed by: INTERNAL MEDICINE

## 2018-05-14 ENCOUNTER — LAB REQUISITION (OUTPATIENT)
Dept: LAB | Facility: HOSPITAL | Age: 78
End: 2018-05-14

## 2018-05-14 DIAGNOSIS — B95.4 OTHER STREPTOCOCCUS AS THE CAUSE OF DISEASES CLASSIFIED ELSEWHERE: ICD-10-CM

## 2018-05-14 DIAGNOSIS — Z45.2 ENCOUNTER FOR ADJUSTMENT OR MANAGEMENT OF VASCULAR ACCESS DEVICE: ICD-10-CM

## 2018-05-14 DIAGNOSIS — T81.40XA INFECTION FOLLOWING PROCEDURE: ICD-10-CM

## 2018-05-14 DIAGNOSIS — I10 ESSENTIAL (PRIMARY) HYPERTENSION: ICD-10-CM

## 2018-05-14 DIAGNOSIS — I97.638 POSTPROCEDURAL HEMATOMA OF A CIRCULATORY SYSTEM ORGAN OR STRUCTURE FOLLOWING OTHER CIRCULATORY SYSTEM PROCEDURE: ICD-10-CM

## 2018-05-14 DIAGNOSIS — B96.89 OTHER SPECIFIED BACTERIAL AGENTS AS THE CAUSE OF DISEASES CLASSIFIED ELSEWHERE: ICD-10-CM

## 2018-05-14 DIAGNOSIS — Z79.2 LONG TERM CURRENT USE OF ANTIBIOTICS: ICD-10-CM

## 2018-05-14 LAB
ANION GAP SERPL CALCULATED.3IONS-SCNC: 13.3 MMOL/L
BASOPHILS # BLD AUTO: 0.09 10*3/MM3 (ref 0–0.2)
BASOPHILS NFR BLD AUTO: 1.4 % (ref 0–1.5)
BUN BLD-MCNC: 10 MG/DL (ref 8–23)
BUN/CREAT SERPL: 10 (ref 7–25)
CALCIUM SPEC-SCNC: 9.1 MG/DL (ref 8.6–10.5)
CHLORIDE SERPL-SCNC: 102 MMOL/L (ref 98–107)
CO2 SERPL-SCNC: 25.7 MMOL/L (ref 22–29)
CREAT BLD-MCNC: 1 MG/DL (ref 0.57–1)
CRP SERPL-MCNC: 0.5 MG/DL (ref 0–0.5)
DEPRECATED RDW RBC AUTO: 54 FL (ref 37–54)
EOSINOPHIL # BLD AUTO: 0.27 10*3/MM3 (ref 0–0.7)
EOSINOPHIL NFR BLD AUTO: 4.3 % (ref 0.3–6.2)
ERYTHROCYTE [DISTWIDTH] IN BLOOD BY AUTOMATED COUNT: 15.9 % (ref 11.7–13)
ERYTHROCYTE [SEDIMENTATION RATE] IN BLOOD: 33 MM/HR (ref 0–30)
GFR SERPL CREATININE-BSD FRML MDRD: 54 ML/MIN/1.73
GLUCOSE BLD-MCNC: 83 MG/DL (ref 65–99)
HCT VFR BLD AUTO: 36.2 % (ref 35.6–45.5)
HGB BLD-MCNC: 11.7 G/DL (ref 11.9–15.5)
IMM GRANULOCYTES # BLD: 0.03 10*3/MM3 (ref 0–0.03)
IMM GRANULOCYTES NFR BLD: 0.5 % (ref 0–0.5)
LYMPHOCYTES # BLD AUTO: 2.16 10*3/MM3 (ref 0.9–4.8)
LYMPHOCYTES NFR BLD AUTO: 34.6 % (ref 19.6–45.3)
MCH RBC QN AUTO: 30.2 PG (ref 26.9–32)
MCHC RBC AUTO-ENTMCNC: 32.3 G/DL (ref 32.4–36.3)
MCV RBC AUTO: 93.5 FL (ref 80.5–98.2)
MONOCYTES # BLD AUTO: 0.56 10*3/MM3 (ref 0.2–1.2)
MONOCYTES NFR BLD AUTO: 9 % (ref 5–12)
NEUTROPHILS # BLD AUTO: 3.17 10*3/MM3 (ref 1.9–8.1)
NEUTROPHILS NFR BLD AUTO: 50.7 % (ref 42.7–76)
PLATELET # BLD AUTO: 315 10*3/MM3 (ref 140–500)
PMV BLD AUTO: 9.5 FL (ref 6–12)
POTASSIUM BLD-SCNC: 4 MMOL/L (ref 3.5–5.2)
RBC # BLD AUTO: 3.87 10*6/MM3 (ref 3.9–5.2)
SODIUM BLD-SCNC: 141 MMOL/L (ref 136–145)
WBC NRBC COR # BLD: 6.25 10*3/MM3 (ref 4.5–10.7)

## 2018-05-14 PROCEDURE — 80048 BASIC METABOLIC PNL TOTAL CA: CPT | Performed by: INTERNAL MEDICINE

## 2018-05-14 PROCEDURE — 85025 COMPLETE CBC W/AUTO DIFF WBC: CPT | Performed by: INTERNAL MEDICINE

## 2018-05-14 PROCEDURE — 85652 RBC SED RATE AUTOMATED: CPT | Performed by: INTERNAL MEDICINE

## 2018-05-14 PROCEDURE — 86140 C-REACTIVE PROTEIN: CPT | Performed by: INTERNAL MEDICINE

## 2018-05-21 ENCOUNTER — LAB REQUISITION (OUTPATIENT)
Dept: LAB | Facility: HOSPITAL | Age: 78
End: 2018-05-21

## 2018-05-21 DIAGNOSIS — Z45.2 ENCOUNTER FOR ADJUSTMENT OR MANAGEMENT OF VASCULAR ACCESS DEVICE: ICD-10-CM

## 2018-05-21 DIAGNOSIS — I10 ESSENTIAL (PRIMARY) HYPERTENSION: ICD-10-CM

## 2018-05-21 DIAGNOSIS — Z79.02 LONG TERM CURRENT USE OF ANTITHROMBOTICS/ANTIPLATELETS: ICD-10-CM

## 2018-05-21 DIAGNOSIS — I97.638 POSTPROCEDURAL HEMATOMA OF A CIRCULATORY SYSTEM ORGAN OR STRUCTURE FOLLOWING OTHER CIRCULATORY SYSTEM PROCEDURE: ICD-10-CM

## 2018-05-21 DIAGNOSIS — B95.4 OTHER STREPTOCOCCUS AS THE CAUSE OF DISEASES CLASSIFIED ELSEWHERE: ICD-10-CM

## 2018-05-21 DIAGNOSIS — Z79.2 LONG TERM CURRENT USE OF ANTIBIOTICS: ICD-10-CM

## 2018-05-21 LAB
ALBUMIN SERPL-MCNC: 3.4 G/DL (ref 3.5–5.2)
ALBUMIN/GLOB SERPL: 1.5 G/DL
ALP SERPL-CCNC: 58 U/L (ref 39–117)
ALT SERPL W P-5'-P-CCNC: 14 U/L (ref 1–33)
ANION GAP SERPL CALCULATED.3IONS-SCNC: 18.5 MMOL/L
AST SERPL-CCNC: 22 U/L (ref 1–32)
BASOPHILS # BLD AUTO: 0.05 10*3/MM3 (ref 0–0.2)
BASOPHILS NFR BLD AUTO: 1 % (ref 0–1.5)
BILIRUB SERPL-MCNC: 0.3 MG/DL (ref 0.1–1.2)
BUN BLD-MCNC: 9 MG/DL (ref 8–23)
BUN/CREAT SERPL: 9.3 (ref 7–25)
CALCIUM SPEC-SCNC: 9.2 MG/DL (ref 8.6–10.5)
CHLORIDE SERPL-SCNC: 103 MMOL/L (ref 98–107)
CO2 SERPL-SCNC: 21.5 MMOL/L (ref 22–29)
CREAT BLD-MCNC: 0.97 MG/DL (ref 0.57–1)
CRP SERPL-MCNC: 0.31 MG/DL (ref 0–0.5)
DEPRECATED RDW RBC AUTO: 52.4 FL (ref 37–54)
EOSINOPHIL # BLD AUTO: 0.22 10*3/MM3 (ref 0–0.7)
EOSINOPHIL NFR BLD AUTO: 4.2 % (ref 0.3–6.2)
ERYTHROCYTE [DISTWIDTH] IN BLOOD BY AUTOMATED COUNT: 15.2 % (ref 11.7–13)
ERYTHROCYTE [SEDIMENTATION RATE] IN BLOOD: 24 MM/HR (ref 0–30)
GFR SERPL CREATININE-BSD FRML MDRD: 56 ML/MIN/1.73
GLOBULIN UR ELPH-MCNC: 2.3 GM/DL
GLUCOSE BLD-MCNC: 63 MG/DL (ref 65–99)
HCT VFR BLD AUTO: 35.9 % (ref 35.6–45.5)
HGB BLD-MCNC: 11.3 G/DL (ref 11.9–15.5)
IMM GRANULOCYTES # BLD: 0.02 10*3/MM3 (ref 0–0.03)
IMM GRANULOCYTES NFR BLD: 0.4 % (ref 0–0.5)
LYMPHOCYTES # BLD AUTO: 1.79 10*3/MM3 (ref 0.9–4.8)
LYMPHOCYTES NFR BLD AUTO: 34.4 % (ref 19.6–45.3)
MCH RBC QN AUTO: 30 PG (ref 26.9–32)
MCHC RBC AUTO-ENTMCNC: 31.5 G/DL (ref 32.4–36.3)
MCV RBC AUTO: 95.2 FL (ref 80.5–98.2)
MONOCYTES # BLD AUTO: 0.54 10*3/MM3 (ref 0.2–1.2)
MONOCYTES NFR BLD AUTO: 10.4 % (ref 5–12)
NEUTROPHILS # BLD AUTO: 2.58 10*3/MM3 (ref 1.9–8.1)
NEUTROPHILS NFR BLD AUTO: 49.6 % (ref 42.7–76)
PLATELET # BLD AUTO: 273 10*3/MM3 (ref 140–500)
PMV BLD AUTO: 9.6 FL (ref 6–12)
POTASSIUM BLD-SCNC: 3.5 MMOL/L (ref 3.5–5.2)
PROT SERPL-MCNC: 5.7 G/DL (ref 6–8.5)
RBC # BLD AUTO: 3.77 10*6/MM3 (ref 3.9–5.2)
SODIUM BLD-SCNC: 143 MMOL/L (ref 136–145)
WBC NRBC COR # BLD: 5.2 10*3/MM3 (ref 4.5–10.7)

## 2018-05-21 PROCEDURE — 85025 COMPLETE CBC W/AUTO DIFF WBC: CPT | Performed by: INTERNAL MEDICINE

## 2018-05-21 PROCEDURE — 86140 C-REACTIVE PROTEIN: CPT | Performed by: INTERNAL MEDICINE

## 2018-05-21 PROCEDURE — 80053 COMPREHEN METABOLIC PANEL: CPT | Performed by: INTERNAL MEDICINE

## 2018-05-21 PROCEDURE — 85652 RBC SED RATE AUTOMATED: CPT | Performed by: INTERNAL MEDICINE

## 2018-05-30 ENCOUNTER — LAB REQUISITION (OUTPATIENT)
Dept: LAB | Facility: HOSPITAL | Age: 78
End: 2018-05-30

## 2018-05-30 DIAGNOSIS — Z45.2 ENCOUNTER FOR ADJUSTMENT OR MANAGEMENT OF VASCULAR ACCESS DEVICE: ICD-10-CM

## 2018-05-30 DIAGNOSIS — Z79.2 LONG TERM CURRENT USE OF ANTIBIOTICS: ICD-10-CM

## 2018-05-30 DIAGNOSIS — B95.4 OTHER STREPTOCOCCUS AS THE CAUSE OF DISEASES CLASSIFIED ELSEWHERE: ICD-10-CM

## 2018-05-30 DIAGNOSIS — T81.40XA INFECTION FOLLOWING PROCEDURE: ICD-10-CM

## 2018-05-30 DIAGNOSIS — B96.89 OTHER SPECIFIED BACTERIAL AGENTS AS THE CAUSE OF DISEASES CLASSIFIED ELSEWHERE: ICD-10-CM

## 2018-05-30 LAB
ALBUMIN SERPL-MCNC: 3.1 G/DL (ref 3.5–5.2)
ALBUMIN/GLOB SERPL: 1.3 G/DL
ALP SERPL-CCNC: 69 U/L (ref 39–117)
ALT SERPL W P-5'-P-CCNC: 19 U/L (ref 1–33)
ANION GAP SERPL CALCULATED.3IONS-SCNC: 17 MMOL/L
AST SERPL-CCNC: 26 U/L (ref 1–32)
BASOPHILS # BLD AUTO: 0.04 10*3/MM3 (ref 0–0.2)
BASOPHILS NFR BLD AUTO: 1 % (ref 0–1.5)
BILIRUB SERPL-MCNC: 0.3 MG/DL (ref 0.1–1.2)
BUN BLD-MCNC: 10 MG/DL (ref 8–23)
BUN/CREAT SERPL: 11.1 (ref 7–25)
CALCIUM SPEC-SCNC: 9 MG/DL (ref 8.6–10.5)
CHLORIDE SERPL-SCNC: 102 MMOL/L (ref 98–107)
CO2 SERPL-SCNC: 23 MMOL/L (ref 22–29)
CREAT BLD-MCNC: 0.9 MG/DL (ref 0.57–1)
CRP SERPL-MCNC: 1.51 MG/DL (ref 0–0.5)
DEPRECATED RDW RBC AUTO: 47.4 FL (ref 37–54)
EOSINOPHIL # BLD AUTO: 0.13 10*3/MM3 (ref 0–0.7)
EOSINOPHIL NFR BLD AUTO: 3.3 % (ref 0.3–6.2)
ERYTHROCYTE [DISTWIDTH] IN BLOOD BY AUTOMATED COUNT: 14.1 % (ref 11.7–13)
ERYTHROCYTE [SEDIMENTATION RATE] IN BLOOD: 35 MM/HR (ref 0–30)
GFR SERPL CREATININE-BSD FRML MDRD: 61 ML/MIN/1.73
GLOBULIN UR ELPH-MCNC: 2.3 GM/DL
GLUCOSE BLD-MCNC: 67 MG/DL (ref 65–99)
HCT VFR BLD AUTO: 34.7 % (ref 35.6–45.5)
HGB BLD-MCNC: 11.3 G/DL (ref 11.9–15.5)
IMM GRANULOCYTES # BLD: 0.01 10*3/MM3 (ref 0–0.03)
IMM GRANULOCYTES NFR BLD: 0.3 % (ref 0–0.5)
LYMPHOCYTES # BLD AUTO: 1.43 10*3/MM3 (ref 0.9–4.8)
LYMPHOCYTES NFR BLD AUTO: 35.9 % (ref 19.6–45.3)
MCH RBC QN AUTO: 30.1 PG (ref 26.9–32)
MCHC RBC AUTO-ENTMCNC: 32.6 G/DL (ref 32.4–36.3)
MCV RBC AUTO: 92.3 FL (ref 80.5–98.2)
MONOCYTES # BLD AUTO: 0.5 10*3/MM3 (ref 0.2–1.2)
MONOCYTES NFR BLD AUTO: 12.6 % (ref 5–12)
NEUTROPHILS # BLD AUTO: 1.88 10*3/MM3 (ref 1.9–8.1)
NEUTROPHILS NFR BLD AUTO: 47.2 % (ref 42.7–76)
PLATELET # BLD AUTO: 238 10*3/MM3 (ref 140–500)
PMV BLD AUTO: 9.4 FL (ref 6–12)
POTASSIUM BLD-SCNC: 4.2 MMOL/L (ref 3.5–5.2)
PROT SERPL-MCNC: 5.4 G/DL (ref 6–8.5)
RBC # BLD AUTO: 3.76 10*6/MM3 (ref 3.9–5.2)
SODIUM BLD-SCNC: 142 MMOL/L (ref 136–145)
WBC NRBC COR # BLD: 3.98 10*3/MM3 (ref 4.5–10.7)

## 2018-05-30 PROCEDURE — 80053 COMPREHEN METABOLIC PANEL: CPT | Performed by: INTERNAL MEDICINE

## 2018-05-30 PROCEDURE — 85025 COMPLETE CBC W/AUTO DIFF WBC: CPT | Performed by: INTERNAL MEDICINE

## 2018-05-30 PROCEDURE — 86140 C-REACTIVE PROTEIN: CPT | Performed by: INTERNAL MEDICINE

## 2018-05-30 PROCEDURE — 85652 RBC SED RATE AUTOMATED: CPT | Performed by: INTERNAL MEDICINE

## 2018-06-04 ENCOUNTER — LAB REQUISITION (OUTPATIENT)
Dept: LAB | Facility: HOSPITAL | Age: 78
End: 2018-06-04

## 2018-06-04 DIAGNOSIS — T81.40XA INFECTION FOLLOWING PROCEDURE: ICD-10-CM

## 2018-06-04 LAB
ALBUMIN SERPL-MCNC: 3.5 G/DL (ref 3.5–5.2)
ALBUMIN/GLOB SERPL: 1.5 G/DL
ALP SERPL-CCNC: 78 U/L (ref 39–117)
ALT SERPL W P-5'-P-CCNC: 18 U/L (ref 1–33)
ANION GAP SERPL CALCULATED.3IONS-SCNC: 12.2 MMOL/L
AST SERPL-CCNC: 22 U/L (ref 1–32)
BASOPHILS # BLD AUTO: 0.06 10*3/MM3 (ref 0–0.2)
BASOPHILS NFR BLD AUTO: 1.3 % (ref 0–1.5)
BILIRUB SERPL-MCNC: 0.2 MG/DL (ref 0.1–1.2)
BUN BLD-MCNC: 11 MG/DL (ref 8–23)
BUN/CREAT SERPL: 11.2 (ref 7–25)
CALCIUM SPEC-SCNC: 9.2 MG/DL (ref 8.6–10.5)
CHLORIDE SERPL-SCNC: 106 MMOL/L (ref 98–107)
CO2 SERPL-SCNC: 23.8 MMOL/L (ref 22–29)
CREAT BLD-MCNC: 0.98 MG/DL (ref 0.57–1)
CRP SERPL-MCNC: 1.07 MG/DL (ref 0–0.5)
DEPRECATED RDW RBC AUTO: 44.9 FL (ref 37–54)
EOSINOPHIL # BLD AUTO: 0.15 10*3/MM3 (ref 0–0.7)
EOSINOPHIL NFR BLD AUTO: 3.2 % (ref 0.3–6.2)
ERYTHROCYTE [DISTWIDTH] IN BLOOD BY AUTOMATED COUNT: 13.4 % (ref 11.7–13)
ERYTHROCYTE [SEDIMENTATION RATE] IN BLOOD: 35 MM/HR (ref 0–30)
GFR SERPL CREATININE-BSD FRML MDRD: 55 ML/MIN/1.73
GLOBULIN UR ELPH-MCNC: 2.4 GM/DL
GLUCOSE BLD-MCNC: 106 MG/DL (ref 65–99)
HCT VFR BLD AUTO: 35.8 % (ref 35.6–45.5)
HGB BLD-MCNC: 11.8 G/DL (ref 11.9–15.5)
IMM GRANULOCYTES # BLD: 0.02 10*3/MM3 (ref 0–0.03)
IMM GRANULOCYTES NFR BLD: 0.4 % (ref 0–0.5)
LYMPHOCYTES # BLD AUTO: 1.6 10*3/MM3 (ref 0.9–4.8)
LYMPHOCYTES NFR BLD AUTO: 34.6 % (ref 19.6–45.3)
MCH RBC QN AUTO: 30.3 PG (ref 26.9–32)
MCHC RBC AUTO-ENTMCNC: 33 G/DL (ref 32.4–36.3)
MCV RBC AUTO: 92 FL (ref 80.5–98.2)
MONOCYTES # BLD AUTO: 0.54 10*3/MM3 (ref 0.2–1.2)
MONOCYTES NFR BLD AUTO: 11.7 % (ref 5–12)
NEUTROPHILS # BLD AUTO: 2.25 10*3/MM3 (ref 1.9–8.1)
NEUTROPHILS NFR BLD AUTO: 48.8 % (ref 42.7–76)
PLATELET # BLD AUTO: 245 10*3/MM3 (ref 140–500)
PMV BLD AUTO: 9.7 FL (ref 6–12)
POTASSIUM BLD-SCNC: 4.6 MMOL/L (ref 3.5–5.2)
PROT SERPL-MCNC: 5.9 G/DL (ref 6–8.5)
RBC # BLD AUTO: 3.89 10*6/MM3 (ref 3.9–5.2)
SODIUM BLD-SCNC: 142 MMOL/L (ref 136–145)
WBC NRBC COR # BLD: 4.62 10*3/MM3 (ref 4.5–10.7)

## 2018-06-04 PROCEDURE — 80053 COMPREHEN METABOLIC PANEL: CPT | Performed by: INTERNAL MEDICINE

## 2018-06-04 PROCEDURE — 85025 COMPLETE CBC W/AUTO DIFF WBC: CPT | Performed by: INTERNAL MEDICINE

## 2018-06-04 PROCEDURE — 86140 C-REACTIVE PROTEIN: CPT | Performed by: INTERNAL MEDICINE

## 2018-06-04 PROCEDURE — 85652 RBC SED RATE AUTOMATED: CPT | Performed by: INTERNAL MEDICINE

## 2018-06-11 ENCOUNTER — LAB REQUISITION (OUTPATIENT)
Dept: LAB | Facility: HOSPITAL | Age: 78
End: 2018-06-11

## 2018-06-11 DIAGNOSIS — Z45.2 ENCOUNTER FOR ADJUSTMENT OR MANAGEMENT OF VASCULAR ACCESS DEVICE: ICD-10-CM

## 2018-06-11 DIAGNOSIS — I97.638 POSTPROCEDURAL HEMATOMA OF A CIRCULATORY SYSTEM ORGAN OR STRUCTURE FOLLOWING OTHER CIRCULATORY SYSTEM PROCEDURE: ICD-10-CM

## 2018-06-11 DIAGNOSIS — I10 ESSENTIAL (PRIMARY) HYPERTENSION: ICD-10-CM

## 2018-06-11 DIAGNOSIS — Z95.4 PRESENCE OF OTHER HEART-VALVE REPLACEMENT: ICD-10-CM

## 2018-06-11 DIAGNOSIS — Z79.2 LONG TERM CURRENT USE OF ANTIBIOTICS: ICD-10-CM

## 2018-06-11 LAB
ALBUMIN SERPL-MCNC: 3.4 G/DL (ref 3.5–5.2)
ALBUMIN/GLOB SERPL: 1.5 G/DL
ALP SERPL-CCNC: 79 U/L (ref 39–117)
ALT SERPL W P-5'-P-CCNC: 18 U/L (ref 1–33)
ANION GAP SERPL CALCULATED.3IONS-SCNC: 16 MMOL/L
AST SERPL-CCNC: 25 U/L (ref 1–32)
BASOPHILS # BLD AUTO: 0.06 10*3/MM3 (ref 0–0.2)
BASOPHILS NFR BLD AUTO: 1.6 % (ref 0–1.5)
BILIRUB SERPL-MCNC: 0.3 MG/DL (ref 0.1–1.2)
BUN BLD-MCNC: 14 MG/DL (ref 8–23)
BUN/CREAT SERPL: 15.4 (ref 7–25)
CALCIUM SPEC-SCNC: 9.2 MG/DL (ref 8.6–10.5)
CHLORIDE SERPL-SCNC: 104 MMOL/L (ref 98–107)
CO2 SERPL-SCNC: 22 MMOL/L (ref 22–29)
CREAT BLD-MCNC: 0.91 MG/DL (ref 0.57–1)
CRP SERPL-MCNC: 1.65 MG/DL (ref 0–0.5)
DEPRECATED RDW RBC AUTO: 46.2 FL (ref 37–54)
EOSINOPHIL # BLD AUTO: 0.11 10*3/MM3 (ref 0–0.7)
EOSINOPHIL NFR BLD AUTO: 3 % (ref 0.3–6.2)
ERYTHROCYTE [DISTWIDTH] IN BLOOD BY AUTOMATED COUNT: 13.6 % (ref 11.7–13)
ERYTHROCYTE [SEDIMENTATION RATE] IN BLOOD: 35 MM/HR (ref 0–30)
GFR SERPL CREATININE-BSD FRML MDRD: 60 ML/MIN/1.73
GLOBULIN UR ELPH-MCNC: 2.3 GM/DL
GLUCOSE BLD-MCNC: 108 MG/DL (ref 65–99)
HCT VFR BLD AUTO: 36.2 % (ref 35.6–45.5)
HGB BLD-MCNC: 11.6 G/DL (ref 11.9–15.5)
IMM GRANULOCYTES # BLD: 0 10*3/MM3 (ref 0–0.03)
IMM GRANULOCYTES NFR BLD: 0 % (ref 0–0.5)
LYMPHOCYTES # BLD AUTO: 1.37 10*3/MM3 (ref 0.9–4.8)
LYMPHOCYTES NFR BLD AUTO: 37 % (ref 19.6–45.3)
MCH RBC QN AUTO: 30 PG (ref 26.9–32)
MCHC RBC AUTO-ENTMCNC: 32 G/DL (ref 32.4–36.3)
MCV RBC AUTO: 93.5 FL (ref 80.5–98.2)
MONOCYTES # BLD AUTO: 0.47 10*3/MM3 (ref 0.2–1.2)
MONOCYTES NFR BLD AUTO: 12.7 % (ref 5–12)
NEUTROPHILS # BLD AUTO: 1.69 10*3/MM3 (ref 1.9–8.1)
NEUTROPHILS NFR BLD AUTO: 45.7 % (ref 42.7–76)
PLATELET # BLD AUTO: 219 10*3/MM3 (ref 140–500)
PMV BLD AUTO: 9.6 FL (ref 6–12)
POTASSIUM BLD-SCNC: 3.3 MMOL/L (ref 3.5–5.2)
PROT SERPL-MCNC: 5.7 G/DL (ref 6–8.5)
RBC # BLD AUTO: 3.87 10*6/MM3 (ref 3.9–5.2)
SODIUM BLD-SCNC: 142 MMOL/L (ref 136–145)
WBC NRBC COR # BLD: 3.7 10*3/MM3 (ref 4.5–10.7)

## 2018-06-11 PROCEDURE — 86140 C-REACTIVE PROTEIN: CPT | Performed by: INTERNAL MEDICINE

## 2018-06-11 PROCEDURE — 85652 RBC SED RATE AUTOMATED: CPT | Performed by: INTERNAL MEDICINE

## 2018-06-11 PROCEDURE — 80053 COMPREHEN METABOLIC PANEL: CPT | Performed by: INTERNAL MEDICINE

## 2018-06-11 PROCEDURE — 85025 COMPLETE CBC W/AUTO DIFF WBC: CPT | Performed by: INTERNAL MEDICINE

## 2018-06-18 ENCOUNTER — LAB REQUISITION (OUTPATIENT)
Dept: LAB | Facility: HOSPITAL | Age: 78
End: 2018-06-18

## 2018-06-18 DIAGNOSIS — Z00.00 ENCOUNTER FOR GENERAL ADULT MEDICAL EXAMINATION WITHOUT ABNORMAL FINDINGS: ICD-10-CM

## 2018-06-18 LAB
ALBUMIN SERPL-MCNC: 3.9 G/DL (ref 3.5–5.2)
ALBUMIN/GLOB SERPL: 1.6 G/DL
ALP SERPL-CCNC: 90 U/L (ref 39–117)
ALT SERPL W P-5'-P-CCNC: 19 U/L (ref 1–33)
ANION GAP SERPL CALCULATED.3IONS-SCNC: 16.6 MMOL/L
AST SERPL-CCNC: 26 U/L (ref 1–32)
BASOPHILS # BLD AUTO: 0.04 10*3/MM3 (ref 0–0.2)
BASOPHILS NFR BLD AUTO: 0.8 % (ref 0–1.5)
BILIRUB SERPL-MCNC: 0.2 MG/DL (ref 0.1–1.2)
BUN BLD-MCNC: 23 MG/DL (ref 8–23)
BUN/CREAT SERPL: 22.3 (ref 7–25)
CALCIUM SPEC-SCNC: 10 MG/DL (ref 8.6–10.5)
CHLORIDE SERPL-SCNC: 101 MMOL/L (ref 98–107)
CO2 SERPL-SCNC: 23.4 MMOL/L (ref 22–29)
CREAT BLD-MCNC: 1.03 MG/DL (ref 0.57–1)
CRP SERPL-MCNC: 0.56 MG/DL (ref 0–0.5)
DEPRECATED RDW RBC AUTO: 45.1 FL (ref 37–54)
EOSINOPHIL # BLD AUTO: 0.12 10*3/MM3 (ref 0–0.7)
EOSINOPHIL NFR BLD AUTO: 2.4 % (ref 0.3–6.2)
ERYTHROCYTE [DISTWIDTH] IN BLOOD BY AUTOMATED COUNT: 13.7 % (ref 11.7–13)
ERYTHROCYTE [SEDIMENTATION RATE] IN BLOOD: 33 MM/HR (ref 0–30)
GFR SERPL CREATININE-BSD FRML MDRD: 52 ML/MIN/1.73
GLOBULIN UR ELPH-MCNC: 2.5 GM/DL
GLUCOSE BLD-MCNC: 88 MG/DL (ref 65–99)
HCT VFR BLD AUTO: 35.6 % (ref 35.6–45.5)
HGB BLD-MCNC: 11.7 G/DL (ref 11.9–15.5)
IMM GRANULOCYTES # BLD: 0.03 10*3/MM3 (ref 0–0.03)
IMM GRANULOCYTES NFR BLD: 0.6 % (ref 0–0.5)
LYMPHOCYTES # BLD AUTO: 1.77 10*3/MM3 (ref 0.9–4.8)
LYMPHOCYTES NFR BLD AUTO: 35.3 % (ref 19.6–45.3)
MCH RBC QN AUTO: 30.2 PG (ref 26.9–32)
MCHC RBC AUTO-ENTMCNC: 32.9 G/DL (ref 32.4–36.3)
MCV RBC AUTO: 91.8 FL (ref 80.5–98.2)
MONOCYTES # BLD AUTO: 0.45 10*3/MM3 (ref 0.2–1.2)
MONOCYTES NFR BLD AUTO: 9 % (ref 5–12)
NEUTROPHILS # BLD AUTO: 2.64 10*3/MM3 (ref 1.9–8.1)
NEUTROPHILS NFR BLD AUTO: 52.5 % (ref 42.7–76)
PLATELET # BLD AUTO: 261 10*3/MM3 (ref 140–500)
PMV BLD AUTO: 9.6 FL (ref 6–12)
POTASSIUM BLD-SCNC: 4.2 MMOL/L (ref 3.5–5.2)
PROT SERPL-MCNC: 6.4 G/DL (ref 6–8.5)
RBC # BLD AUTO: 3.88 10*6/MM3 (ref 3.9–5.2)
SODIUM BLD-SCNC: 141 MMOL/L (ref 136–145)
WBC NRBC COR # BLD: 5.02 10*3/MM3 (ref 4.5–10.7)

## 2018-06-18 PROCEDURE — 86140 C-REACTIVE PROTEIN: CPT | Performed by: INTERNAL MEDICINE

## 2018-06-18 PROCEDURE — 85652 RBC SED RATE AUTOMATED: CPT | Performed by: INTERNAL MEDICINE

## 2018-06-18 PROCEDURE — 85025 COMPLETE CBC W/AUTO DIFF WBC: CPT | Performed by: INTERNAL MEDICINE

## 2018-06-18 PROCEDURE — 80053 COMPREHEN METABOLIC PANEL: CPT | Performed by: INTERNAL MEDICINE

## 2018-06-25 ENCOUNTER — LAB REQUISITION (OUTPATIENT)
Dept: LAB | Facility: HOSPITAL | Age: 78
End: 2018-06-25

## 2018-06-25 DIAGNOSIS — Z79.2 LONG TERM CURRENT USE OF ANTIBIOTICS: ICD-10-CM

## 2018-06-25 DIAGNOSIS — B95.4 OTHER STREPTOCOCCUS AS THE CAUSE OF DISEASES CLASSIFIED ELSEWHERE: ICD-10-CM

## 2018-06-25 DIAGNOSIS — Z45.2 ENCOUNTER FOR ADJUSTMENT OR MANAGEMENT OF VASCULAR ACCESS DEVICE: ICD-10-CM

## 2018-06-25 DIAGNOSIS — B96.20 UNSPECIFIED ESCHERICHIA COLI (E. COLI) AS THE CAUSE OF DISEASES CLASSIFIED ELSEWHERE: ICD-10-CM

## 2018-06-25 LAB
ALBUMIN SERPL-MCNC: 3.8 G/DL (ref 3.5–5.2)
ALBUMIN/GLOB SERPL: 1.9 G/DL
ALP SERPL-CCNC: 96 U/L (ref 40–129)
ALT SERPL W P-5'-P-CCNC: 20 U/L (ref 5–33)
ANION GAP SERPL CALCULATED.3IONS-SCNC: 12.4 MMOL/L
AST SERPL-CCNC: 27 U/L (ref 5–32)
BASOPHILS # BLD AUTO: 0.06 10*3/MM3 (ref 0–0.2)
BASOPHILS NFR BLD AUTO: 1.6 % (ref 0–2)
BILIRUB SERPL-MCNC: 0.4 MG/DL (ref 0.2–1.2)
BUN BLD-MCNC: 20 MG/DL (ref 8–23)
BUN/CREAT SERPL: 21.7 (ref 7–25)
CALCIUM SPEC-SCNC: 9.6 MG/DL (ref 8.8–10.5)
CHLORIDE SERPL-SCNC: 103 MMOL/L (ref 98–107)
CO2 SERPL-SCNC: 24.6 MMOL/L (ref 22–29)
CREAT BLD-MCNC: 0.92 MG/DL (ref 0.57–1)
CRP SERPL-MCNC: 0.68 MG/DL (ref 0–0.5)
DEPRECATED RDW RBC AUTO: 44.9 FL (ref 37–54)
EOSINOPHIL # BLD AUTO: 0.07 10*3/MM3 (ref 0.1–0.3)
EOSINOPHIL NFR BLD AUTO: 1.9 % (ref 0–4)
ERYTHROCYTE [DISTWIDTH] IN BLOOD BY AUTOMATED COUNT: 13.2 % (ref 11.5–14.5)
ERYTHROCYTE [SEDIMENTATION RATE] IN BLOOD: 14 MM/HR (ref 0–20)
GFR SERPL CREATININE-BSD FRML MDRD: 59 ML/MIN/1.73
GLOBULIN UR ELPH-MCNC: 2 GM/DL
GLUCOSE BLD-MCNC: 84 MG/DL (ref 65–99)
HCT VFR BLD AUTO: 36.3 % (ref 37–47)
HGB BLD-MCNC: 12.1 G/DL (ref 12–16)
IMM GRANULOCYTES # BLD: 0.01 10*3/MM3 (ref 0–0.03)
IMM GRANULOCYTES NFR BLD: 0.3 % (ref 0–0.5)
LYMPHOCYTES # BLD AUTO: 1.29 10*3/MM3 (ref 0.6–4.8)
LYMPHOCYTES NFR BLD AUTO: 35.1 % (ref 20–45)
MCH RBC QN AUTO: 30.9 PG (ref 27–31)
MCHC RBC AUTO-ENTMCNC: 33.3 G/DL (ref 31–37)
MCV RBC AUTO: 92.8 FL (ref 81–99)
MONOCYTES # BLD AUTO: 0.48 10*3/MM3 (ref 0–1)
MONOCYTES NFR BLD AUTO: 13 % (ref 3–8)
NEUTROPHILS # BLD AUTO: 1.77 10*3/MM3 (ref 1.5–8.3)
NEUTROPHILS NFR BLD AUTO: 48.1 % (ref 45–70)
NRBC BLD MANUAL-RTO: 0 /100 WBC (ref 0–0)
PLATELET # BLD AUTO: 235 10*3/MM3 (ref 140–500)
PMV BLD AUTO: 9.9 FL (ref 7.4–10.4)
POTASSIUM BLD-SCNC: 4.5 MMOL/L (ref 3.5–5.2)
PROT SERPL-MCNC: 5.8 G/DL (ref 6–8.5)
RBC # BLD AUTO: 3.91 10*6/MM3 (ref 4.2–5.4)
SODIUM BLD-SCNC: 140 MMOL/L (ref 136–145)
WBC NRBC COR # BLD: 3.68 10*3/MM3 (ref 4.8–10.8)

## 2018-06-25 PROCEDURE — 85025 COMPLETE CBC W/AUTO DIFF WBC: CPT | Performed by: INTERNAL MEDICINE

## 2018-06-25 PROCEDURE — 85652 RBC SED RATE AUTOMATED: CPT | Performed by: INTERNAL MEDICINE

## 2018-06-25 PROCEDURE — 86140 C-REACTIVE PROTEIN: CPT | Performed by: INTERNAL MEDICINE

## 2018-06-25 PROCEDURE — 80053 COMPREHEN METABOLIC PANEL: CPT | Performed by: INTERNAL MEDICINE

## 2021-12-06 ENCOUNTER — OFFICE (OUTPATIENT)
Dept: URBAN - METROPOLITAN AREA CLINIC 75 | Facility: CLINIC | Age: 81
End: 2021-12-06

## 2021-12-06 VITALS
WEIGHT: 151 LBS | SYSTOLIC BLOOD PRESSURE: 136 MMHG | OXYGEN SATURATION: 94 % | HEART RATE: 69 BPM | DIASTOLIC BLOOD PRESSURE: 90 MMHG | HEIGHT: 61 IN

## 2021-12-06 DIAGNOSIS — K21.9 GASTRO-ESOPHAGEAL REFLUX DISEASE WITHOUT ESOPHAGITIS: ICD-10-CM

## 2021-12-06 PROCEDURE — 99204 OFFICE O/P NEW MOD 45 MIN: CPT | Performed by: INTERNAL MEDICINE

## 2021-12-06 NOTE — SERVICENOTES
records reviewed.  Chest CT unremarkable other than gallstones.  CBC CMP unremarkable.  TSH 1.38.  Stool was heme negative.

## 2021-12-06 NOTE — SERVICEHPINOTES
thank you very much for referring Ms. Arguello for evaluation, I have not seen her in several years.  She does have a history of reflux.  For the past couple of years she's been having persistent cough especially at night when she lies down, she has hoarseness and gravelly voice.  She was evaluated by a pulmonologist and it was thought symptoms were GI related.  She is on a PPI.  She is not having any dysphagia, odynophagia melena or hematemesis.  she doesn't smoke, she is a social drinker.  He is not on any blood thinners.  She actually had "a brain bleed" and was treated with coils so she does not take blood thinners.  She had a CT scan of the chest done as part of workup which was unremarkable other than some gallstones.
br
neil   She herhas no lower GI complaints, I did a colonoscopy on her in , her grandmother had colon cancer in her 40s which is a concern even though it's not a first-degree relative, given the young age it is worrisome.  This was on her mother's side and her mother  at 42 years old and she's not sure why.  Family history is otherwise negative.  Shee does have some constipation, activity a seems to help.  She is not anemic.  She was heme-negative.  She is in no distress.  She does not look acutely ill.

## 2024-03-11 NOTE — THERAPY TREATMENT NOTE
I will send you recommendations about     DEXA next year    Colonoscopy 2028       At 8% risk we recommend statin     The 10-year ASCVD risk score (Javier BECKWITH, et al., 2019) is: 1.9%    Values used to calculate the score:      Age: 61 years      Sex: Female      Is Non- : No      Diabetic: No      Tobacco smoker: No      Systolic Blood Pressure: 108 mmHg      Is BP treated: No      HDL Cholesterol: 92 mg/dL      Total Cholesterol: 238 mg/dL     Inpatient Rehabilitation - Speech Language Pathology Treatment Note    [facility]       Patient Name: Herlinda Ta  : 1940  MRN: [id number]    Today's Date: 2018           Admit Date: 2018      Visit Dx:        ICD-10-CM ICD-9-CM   1. 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   • Cerebral vasospasm   • Cervical pain (neck)   • Depressed   • Depression   • Dyspepsia   • Acid reflux   • Groin hematoma   • HTN, goal below 140/90   • HLD (hyperlipidemia)   • Infection of artery   • Iron deficiency anemia   • MRSA (methicillin resistant staph aureus) culture positive   • Rheumatoid arthritis   • Urinary incontinence, mixed   • Subarachnoid hemorrhage          Therapy Treatment    Evaluation/Coping    Evaluation/Treatment Time and Intent  Document Type: therapy note (daily note) (18 1000 : Katherine L Bruton)  Mode of Treatment: speech-language pathology, individual therapy (18 1000 : Katherine L Bruton)  Patient/Family Observations: Pt alert and agreeable to therapy. No complaints. (18 1000 : Katherine L Bruton)  Start Time (Evaluation/Treatment): 1000 (18 1000 : Katherine L Bruton)  Stop Time (Evaluation/Treatment): 1030 (18 1000 : Katherine L Bruton)    Vitals/Pain/Safety    Pain Scale: Numbers Pre/Post-Treatment  Pain Scale: Numbers, Pretreatment: 0/10 - no pain (18 1000 : Katherine L Bruton)    Cognition/Communication         Oral Motor/Eating         Mobility/Basic Activities/Instrumental Activities/Motor/Modality                   ROM/MMT                   Sensory/Myotome/Dermatome/Edema               Posture/Balance/Special Tests/Exercise/Transportation/Sexual Function                   Orthotics/Residual  Limb/Prosthetic Management              Outcome Summary         EDUCATION    The patient has been educated in the following areas:     Cognitive Impairment.    SLP Recommendation and Plan                        Anticipated Dischage Disposition: unknown                          Plan of Care Reviewed With: patient, spouse, family            SLP GOALS     Row Name 04/20/18 1000 04/20/18 0800 04/19/18 1300       Memory Skills Goal 1 (SLP)    Improve Memory Skills Through Goal 1 (SLP) recalling related word lists with an imposed delay   recalled 3 related errands from previous session  -KB  -- recalling unrelated word lists immediately  -KB    Progress (Memory Skills Goal 1, SLP) 100%;other (comment)   3/3 no cues  -KB  --  --    Progress/Outcomes (Memory Skills Goal 1, SLP) good progress toward goal;goal partially met  -KB  -- discharged from facility;goal not met  -KB       Memory Skills Goal 2 (SLP)    Improve Memory Skills Through Goal 2 (SLP)  --  -- repeat list in sequential order  -KB    Progress/Outcomes (Memory Skills Goal 2, SLP)  --  -- discharged from facility;goal not met  -KB       Memory Skills Goal (SLP)    Improve Memory Skills Through Goal (SLP) recall 4 related words immediately  -KB  -- visual memory: CT [place]ap[place] L2  -KB    Progress (Memory Skills Goal, SLP) 70%;independently (over 90% accuracy);100%;with moderate cues (50-74%)   repetitions, phonemic, semantic cues as needed  -KB  -- 90%;independently (over 90% accuracy)  -KB    Progress/Outcomes (Memory Skills Goal, SLP) good progress toward goal;goal ongoing  -KB  -- discharged from facility;goal met  -KB       Organizational Skills Goal 1 (SLP)    Improve Thought Organization Through Goal 1 (SLP)  --  -- completing a verbal sequencing task  -KB    Progress/Outcomes (Thought Organization Skills Goal 1, SLP)  --  -- discharged from facility;goal not met  -KB       Organizational Skills Goal 2 (SLP)    Improve Thought Organization Through Goal 2  (SLP)  -- completing a divergent naming task   name 5 items in concrete categories x5  -KB completing a divergent naming task  -KB    Progress (Thought Organization Skills Goal 2, SLP)  -- 70%;independently (over 90% accuracy);100%;with minimal cues (75-90%)  -KB  --    Progress/Outcomes (Thought Organization Skills Goal 2, SLP)  -- goal ongoing  -KB discharged from facility;goal not met  -KB       Organizational Skills Goal (SLP)    Improve Thought Organization Through Goal (SLP)  -- 2 similaritites/differences between two items  -KB  --    Progress (Thought Organization Skills Goal, SLP)  -- 60%;independently (over 90% accuracy);100%;with minimal cues (75-90%)  -KB  --    Progress/Outcomes (Thought Organization Skills Goal, SLP)  -- goal ongoing  -KB  --       Reasoning Goal 1 (SLP)    Improve Reasoning Through Goal 1 (SLP)  -- complete deductive reasoning task   concrete category deduction  -KB complete deductive reasoning task  -KB    Progress (Reasoning Goal 1, SLP)  -- 90%;independently (over 90% accuracy)  -KB  --    Progress/Outcomes (Reasoning Goal 1, SLP)  -- good progress toward goal;goal partially met  -KB discharged from facility;goal not met  -KB       Reasoning Goal (SLP)    Improve Reasoning Through Goal (SLP) mildly-complex word puzzle  -KB  -- mildly-complex word puzzle  -KB    Progress (Reasoning Goal, SLP) 40%;independently (over 90% accuracy);90%;with moderate cues (50-74%)  -KB  -- 50%;independently (over 90% accuracy);70%;with minimal cues (75-90%);90%;with maximum cues (25-49%)  -KB    Progress/Outcomes (Reasoning Goal, SLP) goal ongoing  -KB  -- discharged from facility;goal not met  -KB    Comment (Reasoning Goal, SLP) Pt required consistent MOD verbal cues and at times MAX verbal cues to determine a target word given a synonym or definition.    MIN cues given throughout task for attention.   -KB  --  --       Executive Functional Skills Goal 1 (SLP)    Improve Executive Function Skills  Goal 1 (SLP)  --  -- exhibit cognitive flexibility  -KB    Progress/Outcomes (Executive Function Skills Goal 1, SLP)  --  -- discharged from facility;goal not met  -KB    Row Name 04/19/18 1000 04/18/18 1500 04/18/18 1000       Memory Skills Goal 1 (SLP)    Improve Memory Skills Through Goal 1 (SLP)  -- recalling unrelated word lists immediately   4-words urelated  -KB  --    Progress (Memory Skills Goal 1, SLP)  -- 60%;independently (over 90% accuracy);90%;with moderate cues (50-74%)  -KB  --    Progress/Outcomes (Memory Skills Goal 1, SLP)  -- goal ongoing  -KB  --       Memory Skills Goal 2 (SLP)    Improve Memory Skills Through Goal 2 (SLP) repeat list in sequential order   3-word progression  -KB  --  --    Progress (Memory Skills Goal 2, SLP) 50%;independently (over 90% accuracy);70%;with minimal cues (75-90%);100%;with maximum cues (25-49%)  -KB  --  --    Progress/Outcomes (Memory Skills Goal 2, SLP) goal ongoing  -KB  --  --       Memory Skills Goal (SLP)    Improve Memory Skills Through Goal (SLP) read paragraph and answer questions  -KB read paragraph and answer questions: short functional meliza  -KB  --    Progress (Memory Skills Goal, SLP) 40%  -KB 70%  -KB  --    Progress/Outcomes (Memory Skills Goal, SLP) goal ongoing  -KB goal ongoing  -KB  --    Comment (Memory Skills Goal, SLP) MOD-MAX cues required to recall information read in a paragraph. Initial cues for strategies: repetition and underlining.   -KB  --  --       Organizational Skills Goal 2 (SLP)    Improve Thought Organization Through Goal 2 (SLP) completing a divergent naming task   items in concrete category: sports  -KB completing a divergent naming task   completion of 4x4 matrix given category and initial letter  -KB  --    Progress (Thought Organization Skills Goal 2, SLP)  -- 80%;independently (over 90% accuracy);100%;with minimal cues (75-90%)  -KB  --    Progress/Outcomes (Thought Organization Skills Goal 2, SLP) goal ongoing  -KB  good progress toward goal;goal ongoing  -KB  --    Comment (Thought Organization Skills Goal 2, SLP) 4 in 1 minute with perseveration; extra time and MOD cues for up to 7  -KB  --  --       Organizational Skills Goal (SLP)    Improve Thought Organization Through Goal (SLP)  --  -- functional pill sorting task  -KB    Progress (Thought Organization Skills Goal, SLP)  --  -- with moderate cues (50-74%)   5 cues needed to complete 5 items  -KB    Progress/Outcomes (Thought Organization Skills Goal, SLP)  --  -- goal ongoing  -KB    Comment (Thought Organization Skills Goal, SLP)  --  -- Pt required MOD verbal and visual cues to complete task with 100% accuracy. Errors were made in finding the correct medication and placement in pill organizer.   -KB       Reasoning Goal (SLP)    Improve Reasoning Through Goal (SLP) deductive reasoning/logic puzzle: Jen Closet  -KB  --  --    Progress (Reasoning Goal, SLP) 50%;independently (over 90% accuracy);100%;with moderate cues (50-74%)  -KB  --  --    Progress/Outcomes (Reasoning Goal, SLP) goal ongoing  -KB  --  --       Executive Functional Skills Goal 1 (SLP)    Improve Executive Function Skills Goal 1 (SLP)  --  -- exhibit cognitive flexibility   stating similarities and differences  -KB    Progress (Executive Function Skills Goal 1, SLP)  --  -- 40%;independently (over 90% accuracy);100%;with moderate cues (50-74%)  -KB    Progress/Outcomes (Executive Function Skills Goal 1, SLP)  --  -- goal ongoing  -KB      User Key  (r) = Recorded By, (t) = Taken By, (c) = Cosigned By    Initials Name Provider Type    KB Katherine L Bruton Speech and Language Pathologist               SLP Outcome Measures (last 72 hours)      SLP Outcome Measures     Row Name 04/19/18 1500             SLP Outcome Measures    Outcome Measure Used? Adult NOMS  -KB         FCM Scores    FCM Chosen Memory  -KB      Memory FCM Score 5  -KB        User Key  (r) = Recorded By, (t) = Taken By, (c) = Cosigned  By    Initials Name Effective Dates    KB Katherine L Bruton 03/07/18 -               Time Calculation:             Time Calculation- SLP     Row Name 04/20/18 1030 04/20/18 0844 04/20/18 0835       Time Calculation- SLP    SLP Start Time 1000  -KB  -- 0800  -KB    SLP Stop Time 1030  -KB  -- 0830  -KB    SLP Time Calculation (min) 30 min  -KB  -- 30 min  -KB    SLP Non-Billable Time (min)  -- 15 min   rounds  -KB  --      User Key  (r) = Recorded By, (t) = Taken By, (c) = Cosigned By    Initials Name Provider Type    KB Katherine L Bruton Speech and Language Pathologist            Therapy Charges for Today     Code Description Service Date Service Provider Modifiers Qty    68044271663 HC ST DEV OF COGN SKILLS EACH 15 MIN 4/19/2018 Katherine L Bruton  4    85824491220 HC ST DEV OF COGN SKILLS EACH 15 MIN 4/20/2018 Blaire L Bruton  4               ADULT NOMS (last 72 hours)      Adult NOMS     Row Name 04/19/18 1500                   FCM Scores    FCM Chosen Memory  -KB        Memory FCM Score 5  -KB          User Key  (r) = Recorded By, (t) = Taken By, (c) = Cosigned By    Initials Name Effective Dates    KB Katherine L Bruton 03/07/18 -                    Katherine L Bruton  4/20/2018
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
## 2025-02-19 NOTE — PROGRESS NOTES
Case Management  Inpatient Rehabilitation Plan of Care and Discharge Plan Note    Rehabilitation Diagnosis:  Branch  Date of Onset:  Branch    Medical Summary:  Branch  Past Medical History: Branch    Plan of Care  Updated Problems/Interventions  Field    Expected Intensity:  Branch  Interdisciplinary Team:  Jess  Estimated Length of Stay/Anticipated Discharge Date: Branch  Anticipated Discharge Destination:  Anticipated discharge destination from inpatient rehabilitation is community  discharge with assistance. Patient lives with  in tri-level home. 3 steps  to enter front. Bedroom and bathroom are upstairs. 6 steps up to that level.  D/C plan is home with  and home health services.      Based on the patient's medical and functional status, their prognosis and  expected level of functional improvement is:  Jess    Signed by: KALINA Gutierrez     Normal for race